# Patient Record
Sex: MALE | Race: WHITE | Employment: UNEMPLOYED | URBAN - METROPOLITAN AREA
[De-identification: names, ages, dates, MRNs, and addresses within clinical notes are randomized per-mention and may not be internally consistent; named-entity substitution may affect disease eponyms.]

---

## 2017-02-21 ENCOUNTER — OFFICE VISIT (OUTPATIENT)
Dept: FAMILY MEDICINE CLINIC | Age: 52
End: 2017-02-21

## 2017-02-21 VITALS — DIASTOLIC BLOOD PRESSURE: 110 MMHG | WEIGHT: 89 LBS | BODY MASS INDEX: 17.38 KG/M2 | SYSTOLIC BLOOD PRESSURE: 160 MMHG

## 2017-02-21 DIAGNOSIS — G47.33 OBSTRUCTIVE SLEEP APNEA: ICD-10-CM

## 2017-02-21 DIAGNOSIS — I10 ESSENTIAL HYPERTENSION: Primary | ICD-10-CM

## 2017-02-21 DIAGNOSIS — F41.9 ANXIETY: ICD-10-CM

## 2017-02-21 DIAGNOSIS — Z00.00 PREVENTATIVE HEALTH CARE: ICD-10-CM

## 2017-02-21 DIAGNOSIS — K21.9 GERD WITHOUT ESOPHAGITIS: ICD-10-CM

## 2017-02-21 DIAGNOSIS — Z11.59 NEED FOR HEPATITIS C SCREENING TEST: ICD-10-CM

## 2017-02-21 DIAGNOSIS — N52.9 ERECTILE DYSFUNCTION OF ORGANIC ORIGIN: ICD-10-CM

## 2017-02-21 DIAGNOSIS — J44.9 COPD, MODERATE (HCC): ICD-10-CM

## 2017-02-21 DIAGNOSIS — G71.00 MUSCULAR DYSTROPHY (HCC): ICD-10-CM

## 2017-02-21 LAB
ANION GAP SERPL CALCULATED.3IONS-SCNC: 14 MMOL/L (ref 3–16)
BUN BLDV-MCNC: 9 MG/DL (ref 7–20)
CALCIUM SERPL-MCNC: 9.4 MG/DL (ref 8.3–10.6)
CHLORIDE BLD-SCNC: 94 MMOL/L (ref 99–110)
CHOLESTEROL, TOTAL: 214 MG/DL (ref 0–199)
CO2: 32 MMOL/L (ref 21–32)
CREAT SERPL-MCNC: <0.5 MG/DL (ref 0.9–1.3)
GFR AFRICAN AMERICAN: >60
GFR NON-AFRICAN AMERICAN: >60
GLUCOSE BLD-MCNC: 106 MG/DL (ref 70–99)
HDLC SERPL-MCNC: 118 MG/DL (ref 40–60)
HEPATITIS C ANTIBODY INTERPRETATION: NORMAL
LDL CHOLESTEROL CALCULATED: 76 MG/DL
POTASSIUM SERPL-SCNC: 4.2 MMOL/L (ref 3.5–5.1)
SODIUM BLD-SCNC: 140 MMOL/L (ref 136–145)
TRIGL SERPL-MCNC: 102 MG/DL (ref 0–150)
VLDLC SERPL CALC-MCNC: 20 MG/DL

## 2017-02-21 PROCEDURE — G8484 FLU IMMUNIZE NO ADMIN: HCPCS | Performed by: FAMILY MEDICINE

## 2017-02-21 PROCEDURE — G8419 CALC BMI OUT NRM PARAM NOF/U: HCPCS | Performed by: FAMILY MEDICINE

## 2017-02-21 PROCEDURE — 36415 COLL VENOUS BLD VENIPUNCTURE: CPT | Performed by: FAMILY MEDICINE

## 2017-02-21 PROCEDURE — 3017F COLORECTAL CA SCREEN DOC REV: CPT | Performed by: FAMILY MEDICINE

## 2017-02-21 PROCEDURE — G8427 DOCREV CUR MEDS BY ELIG CLIN: HCPCS | Performed by: FAMILY MEDICINE

## 2017-02-21 PROCEDURE — G8926 SPIRO NO PERF OR DOC: HCPCS | Performed by: FAMILY MEDICINE

## 2017-02-21 PROCEDURE — 1036F TOBACCO NON-USER: CPT | Performed by: FAMILY MEDICINE

## 2017-02-21 PROCEDURE — 3023F SPIROM DOC REV: CPT | Performed by: FAMILY MEDICINE

## 2017-02-21 PROCEDURE — 99214 OFFICE O/P EST MOD 30 MIN: CPT | Performed by: FAMILY MEDICINE

## 2017-02-21 RX ORDER — LISINOPRIL 40 MG/1
TABLET ORAL
Qty: 30 TABLET | Refills: 5 | Status: SHIPPED | OUTPATIENT
Start: 2017-02-21 | End: 2017-09-09 | Stop reason: SDUPTHER

## 2017-02-21 RX ORDER — AMLODIPINE BESYLATE 5 MG/1
TABLET ORAL
Qty: 30 TABLET | Refills: 5 | Status: SHIPPED | OUTPATIENT
Start: 2017-02-21 | End: 2017-09-09 | Stop reason: SDUPTHER

## 2017-02-21 RX ORDER — ALBUTEROL SULFATE 90 UG/1
AEROSOL, METERED RESPIRATORY (INHALATION)
Qty: 8.5 G | Refills: 0 | Status: SHIPPED | OUTPATIENT
Start: 2017-02-21

## 2017-02-21 RX ORDER — DULOXETIN HYDROCHLORIDE 30 MG/1
CAPSULE, DELAYED RELEASE ORAL
Qty: 90 CAPSULE | Refills: 5 | Status: SHIPPED | OUTPATIENT
Start: 2017-02-21 | End: 2017-09-09 | Stop reason: SDUPTHER

## 2017-02-21 RX ORDER — HYDROXYZINE PAMOATE 25 MG/1
25 CAPSULE ORAL 4 TIMES DAILY PRN
Qty: 120 CAPSULE | Refills: 5 | Status: SHIPPED | OUTPATIENT
Start: 2017-02-21 | End: 2017-12-18

## 2017-02-21 RX ORDER — PANTOPRAZOLE SODIUM 40 MG/1
TABLET, DELAYED RELEASE ORAL
Qty: 30 TABLET | Refills: 5 | Status: SHIPPED | OUTPATIENT
Start: 2017-02-21 | End: 2017-09-09 | Stop reason: SDUPTHER

## 2017-02-21 RX ORDER — BUDESONIDE AND FORMOTEROL FUMARATE DIHYDRATE 80; 4.5 UG/1; UG/1
2 AEROSOL RESPIRATORY (INHALATION) 2 TIMES DAILY
Qty: 1 INHALER | Refills: 5 | Status: SHIPPED | OUTPATIENT
Start: 2017-02-21 | End: 2018-01-22 | Stop reason: ALTCHOICE

## 2017-02-22 DIAGNOSIS — R73.9 HYPERGLYCEMIA: ICD-10-CM

## 2017-04-19 ENCOUNTER — OFFICE VISIT (OUTPATIENT)
Dept: FAMILY MEDICINE CLINIC | Age: 52
End: 2017-04-19

## 2017-04-19 VITALS
DIASTOLIC BLOOD PRESSURE: 80 MMHG | SYSTOLIC BLOOD PRESSURE: 124 MMHG | WEIGHT: 85 LBS | BODY MASS INDEX: 16.69 KG/M2 | HEIGHT: 60 IN

## 2017-04-19 DIAGNOSIS — G71.00 MUSCULAR DYSTROPHY (HCC): Primary | ICD-10-CM

## 2017-04-19 DIAGNOSIS — Z00.00 PREVENTATIVE HEALTH CARE: ICD-10-CM

## 2017-04-19 DIAGNOSIS — F41.9 ANXIETY: ICD-10-CM

## 2017-04-19 PROCEDURE — G8427 DOCREV CUR MEDS BY ELIG CLIN: HCPCS | Performed by: FAMILY MEDICINE

## 2017-04-19 PROCEDURE — 1036F TOBACCO NON-USER: CPT | Performed by: FAMILY MEDICINE

## 2017-04-19 PROCEDURE — 99213 OFFICE O/P EST LOW 20 MIN: CPT | Performed by: FAMILY MEDICINE

## 2017-04-19 PROCEDURE — 3017F COLORECTAL CA SCREEN DOC REV: CPT | Performed by: FAMILY MEDICINE

## 2017-04-19 PROCEDURE — G8419 CALC BMI OUT NRM PARAM NOF/U: HCPCS | Performed by: FAMILY MEDICINE

## 2017-09-09 RX ORDER — DULOXETIN HYDROCHLORIDE 30 MG/1
CAPSULE, DELAYED RELEASE ORAL
Qty: 90 CAPSULE | Refills: 0 | Status: SHIPPED | OUTPATIENT
Start: 2017-09-09 | End: 2017-10-09 | Stop reason: SDUPTHER

## 2017-09-09 RX ORDER — LISINOPRIL 40 MG/1
TABLET ORAL
Qty: 30 TABLET | Refills: 0 | Status: SHIPPED | OUTPATIENT
Start: 2017-09-09 | End: 2017-10-09 | Stop reason: SDUPTHER

## 2017-09-09 RX ORDER — PANTOPRAZOLE SODIUM 40 MG/1
TABLET, DELAYED RELEASE ORAL
Qty: 30 TABLET | Refills: 0 | Status: SHIPPED | OUTPATIENT
Start: 2017-09-09 | End: 2017-09-24

## 2017-09-09 RX ORDER — AMLODIPINE BESYLATE 5 MG/1
TABLET ORAL
Qty: 30 TABLET | Refills: 0 | Status: SHIPPED | OUTPATIENT
Start: 2017-09-09 | End: 2017-10-09 | Stop reason: SDUPTHER

## 2017-10-09 RX ORDER — LISINOPRIL 40 MG/1
TABLET ORAL
Qty: 30 TABLET | Refills: 0 | Status: SHIPPED | OUTPATIENT
Start: 2017-10-09 | End: 2017-12-18

## 2017-10-09 RX ORDER — PANTOPRAZOLE SODIUM 40 MG/1
TABLET, DELAYED RELEASE ORAL
Qty: 30 TABLET | Refills: 0 | Status: SHIPPED | OUTPATIENT
Start: 2017-10-09 | End: 2017-12-18

## 2017-10-09 RX ORDER — AMLODIPINE BESYLATE 5 MG/1
TABLET ORAL
Qty: 30 TABLET | Refills: 0 | Status: SHIPPED | OUTPATIENT
Start: 2017-10-09 | End: 2017-12-18

## 2018-01-22 ENCOUNTER — OFFICE VISIT (OUTPATIENT)
Dept: INTERNAL MEDICINE CLINIC | Age: 53
End: 2018-01-22

## 2018-01-22 VITALS
BODY MASS INDEX: 18.26 KG/M2 | HEART RATE: 92 BPM | HEIGHT: 60 IN | OXYGEN SATURATION: 98 % | WEIGHT: 93 LBS | DIASTOLIC BLOOD PRESSURE: 72 MMHG | SYSTOLIC BLOOD PRESSURE: 118 MMHG | TEMPERATURE: 98.1 F

## 2018-01-22 DIAGNOSIS — N40.0 BENIGN PROSTATIC HYPERPLASIA WITHOUT LOWER URINARY TRACT SYMPTOMS: ICD-10-CM

## 2018-01-22 DIAGNOSIS — Z23 NEED FOR INFLUENZA VACCINATION: ICD-10-CM

## 2018-01-22 DIAGNOSIS — G71.00 MUSCULAR DYSTROPHY (HCC): ICD-10-CM

## 2018-01-22 DIAGNOSIS — I10 ESSENTIAL HYPERTENSION: Primary | ICD-10-CM

## 2018-01-22 DIAGNOSIS — Z12.5 SCREENING FOR PROSTATE CANCER: ICD-10-CM

## 2018-01-22 DIAGNOSIS — G47.33 OBSTRUCTIVE SLEEP APNEA: ICD-10-CM

## 2018-01-22 DIAGNOSIS — F32.A DEPRESSION, UNSPECIFIED DEPRESSION TYPE: ICD-10-CM

## 2018-01-22 DIAGNOSIS — F41.9 ANXIETY: ICD-10-CM

## 2018-01-22 DIAGNOSIS — M81.0 OSTEOPOROSIS, UNSPECIFIED OSTEOPOROSIS TYPE, UNSPECIFIED PATHOLOGICAL FRACTURE PRESENCE: ICD-10-CM

## 2018-01-22 DIAGNOSIS — K21.9 GERD WITHOUT ESOPHAGITIS: ICD-10-CM

## 2018-01-22 PROCEDURE — 90686 IIV4 VACC NO PRSV 0.5 ML IM: CPT | Performed by: INTERNAL MEDICINE

## 2018-01-22 PROCEDURE — 99215 OFFICE O/P EST HI 40 MIN: CPT | Performed by: INTERNAL MEDICINE

## 2018-01-22 PROCEDURE — G0008 ADMIN INFLUENZA VIRUS VAC: HCPCS | Performed by: INTERNAL MEDICINE

## 2018-01-22 RX ORDER — AMLODIPINE BESYLATE 5 MG/1
TABLET ORAL
Qty: 21 TABLET | Refills: 0 | Status: SHIPPED | OUTPATIENT
Start: 2018-01-22 | End: 2018-02-09 | Stop reason: SDUPTHER

## 2018-01-22 RX ORDER — DULOXETIN HYDROCHLORIDE 30 MG/1
CAPSULE, DELAYED RELEASE ORAL
Qty: 42 CAPSULE | Refills: 0 | Status: SHIPPED | OUTPATIENT
Start: 2018-01-22 | End: 2018-02-13 | Stop reason: SDUPTHER

## 2018-01-22 RX ORDER — HYDROXYZINE PAMOATE 25 MG/1
25 CAPSULE ORAL 3 TIMES DAILY PRN
Qty: 30 CAPSULE | Refills: 0 | Status: SHIPPED | OUTPATIENT
Start: 2018-01-22 | End: 2018-06-18 | Stop reason: SDUPTHER

## 2018-01-22 RX ORDER — PANTOPRAZOLE SODIUM 40 MG/1
TABLET, DELAYED RELEASE ORAL
Qty: 21 TABLET | Refills: 0 | Status: SHIPPED | OUTPATIENT
Start: 2018-01-22 | End: 2018-02-09 | Stop reason: SDUPTHER

## 2018-01-22 RX ORDER — LISINOPRIL 40 MG/1
TABLET ORAL
Qty: 21 TABLET | Refills: 0 | Status: SHIPPED | OUTPATIENT
Start: 2018-01-22 | End: 2018-02-09 | Stop reason: SDUPTHER

## 2018-01-22 NOTE — PROGRESS NOTES
mcg by mouth daily. No current facility-administered medications on file prior to visit. Allergies:  Review of patient's allergies indicates no known allergies. Social History:   TOBACCO:   reports that he has never smoked. He has never used smokeless tobacco.       ETOH:   reports that he does not drink alcohol. Recreational Drugs: former marijuana use. Employer: on disability  Ambulates: w/o assistance  Relationship status: single  Patient currently lives with family, son    Family History:       Problem Relation Age of Onset    Adopted: Yes    Asthma Mother     Alcohol Abuse Mother     Heart Disease Father      MI    Asthma Maternal Aunt     High Blood Pressure Maternal Aunt     Diabetes Maternal Uncle     High Blood Pressure Maternal Uncle     Stroke Maternal Grandmother     Cancer Maternal Grandmother      Colon     Children: son, 6y old.  heathy          Patient Active Problem List    Diagnosis Date Noted    Hyperglycemia     Nausea & vomiting 11/13/2016    Diarrhea 11/13/2016    Elevated troponin 11/13/2016    Generalized weakness 11/13/2016    Hyponatremia 11/13/2016    Hypochloremia 11/13/2016    Erectile dysfunction of organic origin     Essential hypertension     Obstructive sleep apnea     COPD, moderate (HCC)     GERD without esophagitis     Anxiety     Narcotic abuse     Scoliosis     Muscular dystrophy ([address]        Past Medical History:   Diagnosis Date    Anxiety     COPD, moderate ([address]     Erectile dysfunction of organic origin     Essential hypertension     GERD without esophagitis     Hyperglycemia     Muscular dystrophy ([address]     Narcotic abuse 01/24/2016    Suboxone on drug screen    Obstructive sleep apnea     BIPAP    Scoliosis        Past Surgical History:   Procedure Laterality Date    ACHILLES TENDON SURGERY      both legs    CHOLECYSTECTOMY         Office Visit on 02/21/2017   Component Date Value Ref Range Status    Sodium 02/21/2017 140  136 - 145 mmol/L Final    Potassium 02/21/2017 4.2  3.5 - 5.1 mmol/L Final    Chloride 02/21/2017 94* 99 - 110 mmol/L Final    CO2 02/21/2017 32  21 - 32 mmol/L Final    Anion Gap 02/21/2017 14  3 - 16 Final    Glucose 02/21/2017 106* 70 - 99 mg/dL Final    BUN 02/21/2017 9  7 - 20 mg/dL Final    CREATININE 02/21/2017 <0.5* 0.9 - 1.3 mg/dL Final    GFR Non- 02/21/2017 >60  >60 Final    Comment: >60 mL/min/1.73m2 EGFR, calc. for ages 25 and older using the  MDRD formula (not corrected for weight), is valid for stable  renal function.  GFR  02/21/2017 >60  >60 Final    Comment: Chronic Kidney Disease: less than 60 ml/min/1.73 sq.m. Kidney Failure: less than 15 ml/min/1.73 sq.m. Results valid for patients 18 years and older.  Calcium 02/21/2017 9.4  8.3 - 10.6 mg/dL Final    Cholesterol, Total 02/21/2017 214* 0 - 199 mg/dL Final    Triglycerides 02/21/2017 102  0 - 150 mg/dL Final    HDL 02/21/2017 118* 40 - 60 mg/dL Final    LDL Calculated 02/21/2017 76  <100 mg/dL Final    VLDL Cholesterol Calculated 02/21/2017 20  Not Established mg/dL Final    Hep C Ab Interp 02/21/2017 Non-reactive  Non-reactive Final       Family History   Problem Relation Age of Onset    Adopted:  Yes    Asthma Mother     Alcohol Abuse Mother     Heart Disease Father      MI    Asthma Maternal Aunt     High Blood Pressure Maternal Aunt     Diabetes Maternal Uncle     High Blood Pressure Maternal Uncle     Stroke Maternal Grandmother     Cancer Maternal Grandmother      Colon     Current Outpatient Prescriptions   Medication Sig Dispense Refill    amLODIPine (NORVASC) 5 MG tablet TAKE 1 TABLET BY MOUTH EVERY DAY 21 tablet 0    DULoxetine (CYMBALTA) 30 MG extended release capsule Take 1 by mouth daily for 1 week, then 2 daily for 1 week, then 3 daily for 1 week 42 capsule 0    hydrOXYzine (VISTARIL) 25 MG capsule Take 1 capsule by mouth 3 times daily as needed for Anxiety 30 capsule 0    lisinopril (PRINIVIL;ZESTRIL) 40 MG tablet TAKE 1 TABLET BY MOUTH DAILY 21 tablet 0    pantoprazole (PROTONIX) 40 MG tablet TAKE 1 TABLET BY MOUTH DAILY 21 tablet 0    albuterol sulfate HFA (PROAIR HFA) 108 (90 BASE) MCG/ACT inhaler INHALE 2 PUFFS INTO THE MOUTH EVERY 4 HOURS AS NEEDED FOR WHEEZING OR SHORTNESS OF BREATH 8.5 g 0    albuterol (PROVENTIL) (2.5 MG/3ML) 0.083% nebulizer solution Take 3 mLs by nebulization every 6 hours as needed for Wheezing. 30 each 0    Multiple Vitamins-Minerals (MULTIVITAMIN & MINERAL PO) Take  by mouth.  vitamin B-12 (CYANOCOBALAMIN) 1000 MCG tablet Take 1,000 mcg by mouth daily. No current facility-administered medications for this visit. No Known Allergies    Review of Systems   Constitutional: Negative for chills, fatigue and fever. HENT: Negative for ear pain, sore throat, tinnitus and trouble swallowing. Eyes: Negative for visual disturbance. Respiratory: Negative for shortness of breath and wheezing. Cardiovascular: Negative for chest pain and palpitations. Gastrointestinal: Negative for abdominal pain, diarrhea, nausea and vomiting. Endocrine: Negative for cold intolerance and heat intolerance. Genitourinary: Negative for difficulty urinating and dysuria. Neurological: Negative for dizziness, weakness and numbness. Psychiatric/Behavioral: Negative for agitation, decreased concentration and suicidal ideas. The patient is not nervous/anxious. All other systems reviewed and are negative. Vitals:  /72 (Site: Right Arm, Cuff Size: Medium Adult)   Pulse 92   Temp 98.1 °F (36.7 °C) (Oral)   Ht 5' (1.524 m) Comment: per patient refused to remove shoes  Wt 93 lb (42.2 kg)   SpO2 98%   BMI 18.16 kg/m²     Physical Exam   Constitutional: He is oriented to person, place, and time. He appears well-developed and well-nourished. HENT:   Head: Normocephalic and atraumatic.    Eyes:

## 2018-01-22 NOTE — PROGRESS NOTES
Vaccine Information Sheet, \"Influenza - Inactivated\"  given to Fabby Stewart, or parent/legal guardian of  Fabby Stewart and verbalized understanding. Patient responses:    Have you ever had a reaction to a flu vaccine? No  Are you able to eat eggs without adverse effects? Yes  Do you have any current illness? No  Have you ever had [place] Syndrome? No    Flu vaccine given per order. Please see immunization tab.

## 2018-01-26 ASSESSMENT — ENCOUNTER SYMPTOMS
ABDOMINAL PAIN: 0
NAUSEA: 0
TROUBLE SWALLOWING: 0
VOMITING: 0
SHORTNESS OF BREATH: 0
DIARRHEA: 0
WHEEZING: 0
SORE THROAT: 0

## 2018-02-09 DIAGNOSIS — I10 ESSENTIAL HYPERTENSION: ICD-10-CM

## 2018-02-09 DIAGNOSIS — K21.9 GERD WITHOUT ESOPHAGITIS: ICD-10-CM

## 2018-02-09 RX ORDER — LISINOPRIL 40 MG/1
TABLET ORAL
Qty: 21 TABLET | Refills: 0 | Status: SHIPPED | OUTPATIENT
Start: 2018-02-09 | End: 2018-02-27 | Stop reason: SDUPTHER

## 2018-02-09 RX ORDER — AMLODIPINE BESYLATE 5 MG/1
TABLET ORAL
Qty: 21 TABLET | Refills: 0 | Status: SHIPPED | OUTPATIENT
Start: 2018-02-09 | End: 2018-02-27 | Stop reason: SDUPTHER

## 2018-02-09 RX ORDER — PANTOPRAZOLE SODIUM 40 MG/1
TABLET, DELAYED RELEASE ORAL
Qty: 21 TABLET | Refills: 0 | Status: SHIPPED | OUTPATIENT
Start: 2018-02-09 | End: 2018-02-27 | Stop reason: SDUPTHER

## 2018-02-13 DIAGNOSIS — F32.A DEPRESSION, UNSPECIFIED DEPRESSION TYPE: ICD-10-CM

## 2018-02-13 DIAGNOSIS — F41.9 ANXIETY: ICD-10-CM

## 2018-02-13 DIAGNOSIS — G71.00 MUSCULAR DYSTROPHY (HCC): ICD-10-CM

## 2018-02-13 RX ORDER — DULOXETIN HYDROCHLORIDE 30 MG/1
CAPSULE, DELAYED RELEASE ORAL
Qty: 42 CAPSULE | Refills: 0 | Status: CANCELLED | OUTPATIENT
Start: 2018-02-13

## 2018-02-13 RX ORDER — DULOXETIN HYDROCHLORIDE 30 MG/1
CAPSULE, DELAYED RELEASE ORAL
Qty: 42 CAPSULE | Refills: 0 | Status: SHIPPED | OUTPATIENT
Start: 2018-02-13 | End: 2018-03-09 | Stop reason: SDUPTHER

## 2018-02-27 DIAGNOSIS — K21.9 GERD WITHOUT ESOPHAGITIS: ICD-10-CM

## 2018-02-27 DIAGNOSIS — I10 ESSENTIAL HYPERTENSION: ICD-10-CM

## 2018-02-27 RX ORDER — LISINOPRIL 40 MG/1
TABLET ORAL
Qty: 21 TABLET | Refills: 0 | Status: SHIPPED | OUTPATIENT
Start: 2018-02-27 | End: 2018-03-17 | Stop reason: SDUPTHER

## 2018-02-27 RX ORDER — AMLODIPINE BESYLATE 5 MG/1
TABLET ORAL
Qty: 21 TABLET | Refills: 0 | Status: SHIPPED | OUTPATIENT
Start: 2018-02-27 | End: 2018-03-17 | Stop reason: SDUPTHER

## 2018-02-27 RX ORDER — PANTOPRAZOLE SODIUM 40 MG/1
TABLET, DELAYED RELEASE ORAL
Qty: 21 TABLET | Refills: 0 | Status: SHIPPED | OUTPATIENT
Start: 2018-02-27 | End: 2018-03-17 | Stop reason: SDUPTHER

## 2018-02-28 RX ORDER — DULOXETIN HYDROCHLORIDE 30 MG/1
CAPSULE, DELAYED RELEASE ORAL
Qty: 21 CAPSULE | Refills: 0 | Status: SHIPPED | OUTPATIENT
Start: 2018-02-28 | End: 2018-04-13 | Stop reason: SDUPTHER

## 2018-03-09 DIAGNOSIS — F32.A DEPRESSION, UNSPECIFIED DEPRESSION TYPE: ICD-10-CM

## 2018-03-09 DIAGNOSIS — F41.9 ANXIETY: ICD-10-CM

## 2018-03-09 DIAGNOSIS — G71.00 MUSCULAR DYSTROPHY (HCC): ICD-10-CM

## 2018-03-09 RX ORDER — HYDROXYZINE PAMOATE 25 MG/1
CAPSULE ORAL
Qty: 120 CAPSULE | Refills: 0 | Status: SHIPPED | OUTPATIENT
Start: 2018-03-09 | End: 2018-03-30 | Stop reason: SDUPTHER

## 2018-03-12 RX ORDER — DULOXETIN HYDROCHLORIDE 30 MG/1
CAPSULE, DELAYED RELEASE ORAL
Qty: 42 CAPSULE | Refills: 0 | Status: SHIPPED | OUTPATIENT
Start: 2018-03-12 | End: 2018-03-26 | Stop reason: SDUPTHER

## 2018-03-17 DIAGNOSIS — I10 ESSENTIAL HYPERTENSION: ICD-10-CM

## 2018-03-17 DIAGNOSIS — K21.9 GERD WITHOUT ESOPHAGITIS: ICD-10-CM

## 2018-03-19 RX ORDER — AMLODIPINE BESYLATE 5 MG/1
TABLET ORAL
Qty: 21 TABLET | Refills: 0 | Status: SHIPPED | OUTPATIENT
Start: 2018-03-19 | End: 2018-04-15 | Stop reason: SDUPTHER

## 2018-03-19 RX ORDER — PANTOPRAZOLE SODIUM 40 MG/1
TABLET, DELAYED RELEASE ORAL
Qty: 21 TABLET | Refills: 0 | Status: SHIPPED | OUTPATIENT
Start: 2018-03-19 | End: 2018-04-15 | Stop reason: SDUPTHER

## 2018-03-19 RX ORDER — LISINOPRIL 40 MG/1
TABLET ORAL
Qty: 21 TABLET | Refills: 0 | Status: SHIPPED | OUTPATIENT
Start: 2018-03-19 | End: 2018-03-30

## 2018-03-26 DIAGNOSIS — F41.9 ANXIETY: ICD-10-CM

## 2018-03-26 DIAGNOSIS — F32.A DEPRESSION, UNSPECIFIED DEPRESSION TYPE: ICD-10-CM

## 2018-03-26 DIAGNOSIS — G71.00 MUSCULAR DYSTROPHY (HCC): ICD-10-CM

## 2018-03-26 RX ORDER — DULOXETIN HYDROCHLORIDE 30 MG/1
CAPSULE, DELAYED RELEASE ORAL
Qty: 42 CAPSULE | Refills: 0 | Status: SHIPPED | OUTPATIENT
Start: 2018-03-26 | End: 2018-03-30 | Stop reason: SDUPTHER

## 2018-03-30 ENCOUNTER — OFFICE VISIT (OUTPATIENT)
Dept: INTERNAL MEDICINE CLINIC | Age: 53
End: 2018-03-30

## 2018-03-30 VITALS
SYSTOLIC BLOOD PRESSURE: 96 MMHG | OXYGEN SATURATION: 98 % | HEART RATE: 115 BPM | BODY MASS INDEX: 18.16 KG/M2 | DIASTOLIC BLOOD PRESSURE: 70 MMHG | WEIGHT: 93 LBS

## 2018-03-30 DIAGNOSIS — G71.00 MUSCULAR DYSTROPHY (HCC): Primary | ICD-10-CM

## 2018-03-30 DIAGNOSIS — R73.9 HYPERGLYCEMIA: ICD-10-CM

## 2018-03-30 DIAGNOSIS — Z11.4 SCREENING FOR HIV (HUMAN IMMUNODEFICIENCY VIRUS): ICD-10-CM

## 2018-03-30 DIAGNOSIS — G89.29 OTHER CHRONIC PAIN: ICD-10-CM

## 2018-03-30 PROCEDURE — 99214 OFFICE O/P EST MOD 30 MIN: CPT | Performed by: INTERNAL MEDICINE

## 2018-03-30 RX ORDER — TRAMADOL HYDROCHLORIDE 50 MG/1
50 TABLET ORAL EVERY 8 HOURS PRN
Qty: 30 TABLET | Refills: 0 | Status: SHIPPED | OUTPATIENT
Start: 2018-03-30 | End: 2018-04-29

## 2018-03-30 RX ORDER — LISINOPRIL 20 MG/1
20 TABLET ORAL DAILY
COMMUNITY
End: 2018-07-23 | Stop reason: DRUGHIGH

## 2018-03-30 RX ORDER — PREDNISONE 20 MG/1
40 TABLET ORAL DAILY
Qty: 20 TABLET | Refills: 1 | Status: SHIPPED | OUTPATIENT
Start: 2018-03-30 | End: 2018-07-23 | Stop reason: ALTCHOICE

## 2018-03-30 ASSESSMENT — ENCOUNTER SYMPTOMS
NAUSEA: 0
WHEEZING: 0
DIARRHEA: 0
SORE THROAT: 0
BACK PAIN: 1
SHORTNESS OF BREATH: 0
ABDOMINAL PAIN: 0
TROUBLE SWALLOWING: 0
VOMITING: 0

## 2018-03-30 NOTE — PROGRESS NOTES
Past Surgical History:   Procedure Laterality Date    ACHILLES TENDON SURGERY      both legs    CHOLECYSTECTOMY         Admission on 03/10/2018, Discharged on 03/10/2018   Component Date Value Ref Range Status    Ventricular Rate 03/10/2018 104  BPM Final    Atrial Rate 03/10/2018 104  BPM Final    P-R Interval 03/10/2018 150  ms Final    QRS Duration 03/10/2018 96  ms Final    Q-T Interval 03/10/2018 306  ms Final    QTc Calculation (Bazett) 03/10/2018 402  ms Final    P Axis 03/10/2018 68  degrees Final    R Axis 03/10/2018 75  degrees Final    T Axis 03/10/2018 80  degrees Final    Diagnosis 03/10/2018    Final                    Value:Sinus tachycardia  Possible Left atrial enlargement  Borderline ECG  When compared with ECG of 13-NOV-2016 13:44,  No significant change was found  Confirmed by TREVOR GARDINER [facility] Ivan DEUTSCH ([id number]) on 3/11/2018 9:36:22 AM      WBC 03/10/2018 9.8  4.0 - 11.0 K/uL Final    RBC 03/10/2018 4.48  4.20 - 5.90 M/uL Final    Hemoglobin 03/10/2018 13.1* 13.5 - 17.5 g/dL Final    Hematocrit 03/10/2018 39.9* 40.5 - 52.5 % Final    MCV 03/10/2018 89.1  80.0 - 100.0 fL Final    MCH 03/10/2018 29.3  26.0 - 34.0 pg Final    MCHC 03/10/2018 32.9  31.0 - 36.0 g/dL Final    RDW 03/10/2018 13.7  12.4 - 15.4 % Final    Platelets 95/03/1791 383  135 - 450 K/uL Final    MPV 03/10/2018 6.9  5.0 - 10.5 fL Final    Neutrophils % 03/10/2018 58.1  % Final    Lymphocytes % 03/10/2018 31.8  % Final    Monocytes % 03/10/2018 7.0  % Final    Eosinophils % 03/10/2018 2.1  % Final    Basophils % 03/10/2018 1.0  % Final    Neutrophils # 03/10/2018 5.7  1.7 - 7.7 K/uL Final    Lymphocytes # 03/10/2018 3.1  1.0 - 5.1 K/uL Final    Monocytes # 03/10/2018 0.7  0.0 - 1.3 K/uL Final    Eosinophils # 03/10/2018 0.2  0.0 - 0.6 K/uL Final    Basophils # 03/10/2018 0.1  0.0 - 0.2 K/uL Final    Sodium 03/10/2018 143  136 - 145 mmol/L Final    Potassium 03/10/2018 3.8  3.5 - 5.1 mmol/L Final    Chloride 03/10/2018 97* 99 - 110 mmol/L Final    CO2 03/10/2018 32  21 - 32 mmol/L Final    Anion Gap 03/10/2018 14  3 - 16 Final    Glucose 03/10/2018 145* 70 - 99 mg/dL Final    BUN 03/10/2018 12  7 - 20 mg/dL Final    CREATININE 03/10/2018 <0.5* 0.9 - 1.3 mg/dL Final    GFR Non- 03/10/2018 >60  >60 Final    Comment: >60 mL/min/1.73m2 EGFR, calc. for ages 25 and older using the  MDRD formula (not corrected for weight), is valid for stable  renal function.  GFR  03/10/2018 >60  >60 Final    Comment: Chronic Kidney Disease: less than 60 ml/min/1.73 sq.m. Kidney Failure: less than 15 ml/min/1.73 sq.m. Results valid for patients 18 years and older.  Calcium 03/10/2018 9.0  8.3 - 10.6 mg/dL Final    Total Protein 03/10/2018 7.4  6.4 - 8.2 g/dL Final    Alb 03/10/2018 4.2  3.4 - 5.0 g/dL Final    Albumin/Globulin Ratio 03/10/2018 1.3  1.1 - 2.2 Final    Total Bilirubin 03/10/2018 <0.2  0.0 - 1.0 mg/dL Final    Alkaline Phosphatase 03/10/2018 78  40 - 129 U/L Final    ALT 03/10/2018 16  10 - 40 U/L Final    AST 03/10/2018 22  15 - 37 U/L Final    Comment: Specimen hemolysis has exceeded the interference as defined by Roche. Value may be falsely increased. Suggest recollection if clinically  indicated.  Globulin 03/10/2018 3.2  g/dL Final       Family History   Problem Relation Age of Onset    Adopted:  Yes    Asthma Mother     Alcohol Abuse Mother     Heart Disease Father      MI    Asthma Maternal Aunt     High Blood Pressure Maternal Aunt     Diabetes Maternal Uncle     High Blood Pressure Maternal Uncle     Stroke Maternal Grandmother     Cancer Maternal Grandmother      Colon       Current Outpatient Prescriptions   Medication Sig Dispense Refill    lisinopril (PRINIVIL;ZESTRIL) 20 MG tablet Take 20 mg by mouth daily      predniSONE (DELTASONE) 20 MG tablet Take 2 tablets by mouth daily Take 2 tablets by mouth once daily for 5 days 20 (ULTRAM) 50 MG tablet; Take 1 tablet by mouth every 8 hours as needed for Pain for up to 30 days . Take lowest dose possible to manage pain. Dispense: 30 tablet; Refill: 0    3. Hyperglycemia  - HEMOGLOBIN A1C; Future    4. Screening for HIV (human immunodeficiency virus)  - HIV-1 AND HIV-2 ANTIBODIES; Future      Orders Placed This Encounter   Procedures    HEMOGLOBIN A1C     Standing Status:   Future     Number of Occurrences:   1     Standing Expiration Date:   3/30/2019    HIV-1 AND HIV-2 ANTIBODIES     Standing Status:   Future     Number of Occurrences:   1     Standing Expiration Date:   3/30/2019   [facility] External Referral To Neurology     Referral Priority:   Routine     Referral Type:   Consult for Advice and Opinion     Referral Reason:   Specialty Services Required     Requested Specialty:   Neurology     Number of Visits Requested:   1       Return in about 3 months (around 6/30/2018). Verona Ortega MD     3/30/2018  3:30 PM    Documentation was done using voice recognition dragon software. Every effort was made to ensure accuracy; however, inadvertent unintentional computerized transcription errors may be present.

## 2018-03-31 LAB
ESTIMATED AVERAGE GLUCOSE: 114 MG/DL
HBA1C MFR BLD: 5.6 %

## 2018-04-03 ENCOUNTER — TELEPHONE (OUTPATIENT)
Dept: INTERNAL MEDICINE CLINIC | Age: 53
End: 2018-04-03

## 2018-04-04 ENCOUNTER — TELEPHONE (OUTPATIENT)
Dept: INTERNAL MEDICINE CLINIC | Age: 53
End: 2018-04-04

## 2018-04-05 DIAGNOSIS — G71.00 MUSCULAR DYSTROPHY (HCC): Primary | ICD-10-CM

## 2018-04-05 DIAGNOSIS — G89.29 OTHER CHRONIC PAIN: ICD-10-CM

## 2018-04-05 LAB
HIV AG/AB: NORMAL
HIV ANTIGEN: NORMAL
HIV-1 ANTIBODY: NORMAL
HIV-2 AB: NORMAL

## 2018-04-05 RX ORDER — HYDROCODONE BITARTRATE AND ACETAMINOPHEN 5; 325 MG/1; MG/1
1 TABLET ORAL EVERY 8 HOURS PRN
Qty: 30 TABLET | Refills: 0 | Status: SHIPPED | OUTPATIENT
Start: 2018-04-05 | End: 2018-04-18 | Stop reason: SDUPTHER

## 2018-04-13 RX ORDER — DULOXETIN HYDROCHLORIDE 30 MG/1
CAPSULE, DELAYED RELEASE ORAL
Qty: 21 CAPSULE | Refills: 0 | Status: SHIPPED | OUTPATIENT
Start: 2018-04-13 | End: 2018-04-18 | Stop reason: SDUPTHER

## 2018-04-15 DIAGNOSIS — I10 ESSENTIAL HYPERTENSION: ICD-10-CM

## 2018-04-15 DIAGNOSIS — K21.9 GERD WITHOUT ESOPHAGITIS: ICD-10-CM

## 2018-04-16 RX ORDER — PANTOPRAZOLE SODIUM 40 MG/1
TABLET, DELAYED RELEASE ORAL
Qty: 21 TABLET | Refills: 0 | Status: SHIPPED | OUTPATIENT
Start: 2018-04-16 | End: 2018-05-06 | Stop reason: SDUPTHER

## 2018-04-16 RX ORDER — AMLODIPINE BESYLATE 5 MG/1
TABLET ORAL
Qty: 21 TABLET | Refills: 0 | Status: SHIPPED | OUTPATIENT
Start: 2018-04-16 | End: 2018-05-06 | Stop reason: SDUPTHER

## 2018-04-16 RX ORDER — LISINOPRIL 40 MG/1
TABLET ORAL
Qty: 21 TABLET | Refills: 0 | Status: SHIPPED | OUTPATIENT
Start: 2018-04-16 | End: 2018-04-27 | Stop reason: SDUPTHER

## 2018-04-18 DIAGNOSIS — G71.00 MUSCULAR DYSTROPHY (HCC): ICD-10-CM

## 2018-04-18 DIAGNOSIS — G89.29 OTHER CHRONIC PAIN: ICD-10-CM

## 2018-04-18 RX ORDER — DULOXETIN HYDROCHLORIDE 30 MG/1
CAPSULE, DELAYED RELEASE ORAL
Qty: 21 CAPSULE | Refills: 0 | Status: SHIPPED | OUTPATIENT
Start: 2018-04-18 | End: 2018-04-27 | Stop reason: SDUPTHER

## 2018-04-18 RX ORDER — HYDROCODONE BITARTRATE AND ACETAMINOPHEN 5; 325 MG/1; MG/1
1 TABLET ORAL EVERY 8 HOURS PRN
Qty: 30 TABLET | Refills: 0 | Status: SHIPPED | OUTPATIENT
Start: 2018-04-18 | End: 2018-04-26 | Stop reason: SDUPTHER

## 2018-04-26 DIAGNOSIS — G89.29 OTHER CHRONIC PAIN: ICD-10-CM

## 2018-04-26 DIAGNOSIS — G71.00 MUSCULAR DYSTROPHY (HCC): ICD-10-CM

## 2018-04-26 RX ORDER — HYDROCODONE BITARTRATE AND ACETAMINOPHEN 5; 325 MG/1; MG/1
1 TABLET ORAL EVERY 8 HOURS PRN
Qty: 30 TABLET | Refills: 0 | Status: SHIPPED | OUTPATIENT
Start: 2018-04-26 | End: 2018-05-01 | Stop reason: SDUPTHER

## 2018-04-27 DIAGNOSIS — I10 ESSENTIAL HYPERTENSION: ICD-10-CM

## 2018-04-27 DIAGNOSIS — G71.00 MUSCULAR DYSTROPHY (HCC): ICD-10-CM

## 2018-04-27 DIAGNOSIS — G89.29 OTHER CHRONIC PAIN: ICD-10-CM

## 2018-04-27 RX ORDER — DULOXETIN HYDROCHLORIDE 30 MG/1
CAPSULE, DELAYED RELEASE ORAL
Qty: 21 CAPSULE | Refills: 0 | Status: SHIPPED | OUTPATIENT
Start: 2018-04-27 | End: 2018-05-15 | Stop reason: SDUPTHER

## 2018-04-27 RX ORDER — LISINOPRIL 40 MG/1
TABLET ORAL
Qty: 21 TABLET | Refills: 0 | Status: SHIPPED | OUTPATIENT
Start: 2018-04-27 | End: 2018-05-22 | Stop reason: SDUPTHER

## 2018-04-27 RX ORDER — HYDROCODONE BITARTRATE AND ACETAMINOPHEN 5; 325 MG/1; MG/1
1 TABLET ORAL EVERY 8 HOURS PRN
Qty: 30 TABLET | Refills: 0 | Status: CANCELLED | OUTPATIENT
Start: 2018-04-27 | End: 2018-04-30

## 2018-04-27 RX ORDER — DULOXETIN HYDROCHLORIDE 30 MG/1
CAPSULE, DELAYED RELEASE ORAL
Qty: 21 CAPSULE | Refills: 0 | Status: SHIPPED | OUTPATIENT
Start: 2018-04-27 | End: 2018-05-07 | Stop reason: SDUPTHER

## 2018-05-01 DIAGNOSIS — G71.00 MUSCULAR DYSTROPHY (HCC): ICD-10-CM

## 2018-05-01 DIAGNOSIS — G89.29 OTHER CHRONIC PAIN: ICD-10-CM

## 2018-05-02 DIAGNOSIS — G89.29 OTHER CHRONIC PAIN: ICD-10-CM

## 2018-05-02 DIAGNOSIS — G71.00 MUSCULAR DYSTROPHY (HCC): ICD-10-CM

## 2018-05-02 RX ORDER — HYDROCODONE BITARTRATE AND ACETAMINOPHEN 5; 325 MG/1; MG/1
1 TABLET ORAL EVERY 8 HOURS PRN
Qty: 30 TABLET | Refills: 0 | Status: SHIPPED | OUTPATIENT
Start: 2018-05-02 | End: 2018-05-14 | Stop reason: SDUPTHER

## 2018-05-02 RX ORDER — HYDROCODONE BITARTRATE AND ACETAMINOPHEN 5; 325 MG/1; MG/1
1 TABLET ORAL EVERY 8 HOURS PRN
Qty: 30 TABLET | Refills: 0 | OUTPATIENT
Start: 2018-05-02 | End: 2018-05-05

## 2018-05-06 DIAGNOSIS — I10 ESSENTIAL HYPERTENSION: ICD-10-CM

## 2018-05-06 DIAGNOSIS — K21.9 GERD WITHOUT ESOPHAGITIS: ICD-10-CM

## 2018-05-07 RX ORDER — AMLODIPINE BESYLATE 5 MG/1
TABLET ORAL
Qty: 21 TABLET | Refills: 0 | Status: SHIPPED | OUTPATIENT
Start: 2018-05-07 | End: 2018-05-27 | Stop reason: SDUPTHER

## 2018-05-07 RX ORDER — DULOXETIN HYDROCHLORIDE 30 MG/1
CAPSULE, DELAYED RELEASE ORAL
Qty: 21 CAPSULE | Refills: 0 | Status: SHIPPED | OUTPATIENT
Start: 2018-05-07 | End: 2018-05-19 | Stop reason: SDUPTHER

## 2018-05-07 RX ORDER — PANTOPRAZOLE SODIUM 40 MG/1
TABLET, DELAYED RELEASE ORAL
Qty: 21 TABLET | Refills: 0 | Status: SHIPPED | OUTPATIENT
Start: 2018-05-07 | End: 2018-05-27 | Stop reason: SDUPTHER

## 2018-05-14 DIAGNOSIS — G89.29 OTHER CHRONIC PAIN: ICD-10-CM

## 2018-05-14 DIAGNOSIS — G71.00 MUSCULAR DYSTROPHY (HCC): ICD-10-CM

## 2018-05-14 RX ORDER — HYDROCODONE BITARTRATE AND ACETAMINOPHEN 5; 325 MG/1; MG/1
1 TABLET ORAL EVERY 8 HOURS PRN
Qty: 30 TABLET | Refills: 0 | Status: SHIPPED | OUTPATIENT
Start: 2018-05-14 | End: 2018-05-17

## 2018-05-16 RX ORDER — DULOXETIN HYDROCHLORIDE 30 MG/1
CAPSULE, DELAYED RELEASE ORAL
Qty: 21 CAPSULE | Refills: 0 | Status: SHIPPED | OUTPATIENT
Start: 2018-05-16 | End: 2018-05-22 | Stop reason: SDUPTHER

## 2018-05-19 RX ORDER — DULOXETIN HYDROCHLORIDE 30 MG/1
CAPSULE, DELAYED RELEASE ORAL
Qty: 21 CAPSULE | Refills: 0 | Status: SHIPPED | OUTPATIENT
Start: 2018-05-19 | End: 2018-05-23 | Stop reason: SDUPTHER

## 2018-05-22 DIAGNOSIS — G89.29 OTHER CHRONIC PAIN: ICD-10-CM

## 2018-05-22 DIAGNOSIS — I10 ESSENTIAL HYPERTENSION: ICD-10-CM

## 2018-05-22 DIAGNOSIS — G71.00 MUSCULAR DYSTROPHY (HCC): ICD-10-CM

## 2018-05-23 DIAGNOSIS — G89.29 OTHER CHRONIC PAIN: ICD-10-CM

## 2018-05-23 DIAGNOSIS — G71.00 MUSCULAR DYSTROPHY (HCC): ICD-10-CM

## 2018-05-23 RX ORDER — HYDROCODONE BITARTRATE AND ACETAMINOPHEN 5; 325 MG/1; MG/1
1 TABLET ORAL EVERY 8 HOURS PRN
Qty: 30 TABLET | Refills: 0 | Status: SHIPPED | OUTPATIENT
Start: 2018-05-23 | End: 2018-06-04 | Stop reason: SDUPTHER

## 2018-05-23 RX ORDER — DULOXETIN HYDROCHLORIDE 30 MG/1
CAPSULE, DELAYED RELEASE ORAL
Qty: 21 CAPSULE | Refills: 0 | Status: SHIPPED | OUTPATIENT
Start: 2018-05-23 | End: 2018-07-09

## 2018-05-23 RX ORDER — HYDROCODONE BITARTRATE AND ACETAMINOPHEN 5; 325 MG/1; MG/1
1 TABLET ORAL EVERY 8 HOURS PRN
Qty: 30 TABLET | Refills: 0 | OUTPATIENT
Start: 2018-05-23 | End: 2018-05-26

## 2018-05-23 RX ORDER — LISINOPRIL 40 MG/1
TABLET ORAL
Qty: 21 TABLET | Refills: 0 | Status: SHIPPED | OUTPATIENT
Start: 2018-05-23 | End: 2018-06-18 | Stop reason: SDUPTHER

## 2018-05-23 RX ORDER — DULOXETIN HYDROCHLORIDE 30 MG/1
CAPSULE, DELAYED RELEASE ORAL
Qty: 21 CAPSULE | Refills: 0 | Status: SHIPPED | OUTPATIENT
Start: 2018-05-23 | End: 2018-05-27 | Stop reason: SDUPTHER

## 2018-05-27 DIAGNOSIS — I10 ESSENTIAL HYPERTENSION: ICD-10-CM

## 2018-05-27 DIAGNOSIS — K21.9 GERD WITHOUT ESOPHAGITIS: ICD-10-CM

## 2018-05-30 RX ORDER — DULOXETIN HYDROCHLORIDE 30 MG/1
CAPSULE, DELAYED RELEASE ORAL
Qty: 21 CAPSULE | Refills: 0 | Status: SHIPPED | OUTPATIENT
Start: 2018-05-30 | End: 2018-06-13 | Stop reason: SDUPTHER

## 2018-05-30 RX ORDER — AMLODIPINE BESYLATE 5 MG/1
TABLET ORAL
Qty: 21 TABLET | Refills: 0 | Status: SHIPPED | OUTPATIENT
Start: 2018-05-30 | End: 2018-06-18 | Stop reason: SDUPTHER

## 2018-05-30 RX ORDER — PANTOPRAZOLE SODIUM 40 MG/1
TABLET, DELAYED RELEASE ORAL
Qty: 21 TABLET | Refills: 0 | Status: SHIPPED | OUTPATIENT
Start: 2018-05-30 | End: 2018-06-19 | Stop reason: SDUPTHER

## 2018-06-04 DIAGNOSIS — G71.00 MUSCULAR DYSTROPHY (HCC): ICD-10-CM

## 2018-06-04 DIAGNOSIS — G89.29 OTHER CHRONIC PAIN: ICD-10-CM

## 2018-06-04 RX ORDER — HYDROCODONE BITARTRATE AND ACETAMINOPHEN 5; 325 MG/1; MG/1
1 TABLET ORAL EVERY 8 HOURS PRN
Qty: 30 TABLET | Refills: 0 | Status: SHIPPED | OUTPATIENT
Start: 2018-06-04 | End: 2018-06-18 | Stop reason: SDUPTHER

## 2018-06-13 RX ORDER — DULOXETIN HYDROCHLORIDE 30 MG/1
CAPSULE, DELAYED RELEASE ORAL
Qty: 21 CAPSULE | Refills: 0 | Status: SHIPPED | OUTPATIENT
Start: 2018-06-13 | End: 2018-06-20 | Stop reason: SDUPTHER

## 2018-06-18 DIAGNOSIS — I10 ESSENTIAL HYPERTENSION: ICD-10-CM

## 2018-06-18 DIAGNOSIS — G71.00 MUSCULAR DYSTROPHY (HCC): ICD-10-CM

## 2018-06-18 DIAGNOSIS — G89.29 OTHER CHRONIC PAIN: ICD-10-CM

## 2018-06-18 DIAGNOSIS — F41.9 ANXIETY: ICD-10-CM

## 2018-06-18 RX ORDER — LISINOPRIL 40 MG/1
TABLET ORAL
Qty: 30 TABLET | Refills: 5 | Status: SHIPPED | OUTPATIENT
Start: 2018-06-18 | End: 2018-11-13 | Stop reason: SDUPTHER

## 2018-06-18 RX ORDER — HYDROCODONE BITARTRATE AND ACETAMINOPHEN 5; 325 MG/1; MG/1
1 TABLET ORAL EVERY 8 HOURS PRN
Qty: 30 TABLET | Refills: 0 | Status: SHIPPED | OUTPATIENT
Start: 2018-06-18 | End: 2018-06-26 | Stop reason: SDUPTHER

## 2018-06-18 RX ORDER — HYDROCODONE BITARTRATE AND ACETAMINOPHEN 5; 325 MG/1; MG/1
1 TABLET ORAL EVERY 8 HOURS PRN
Qty: 30 TABLET | Refills: 0 | Status: CANCELLED | OUTPATIENT
Start: 2018-06-18 | End: 2018-06-21

## 2018-06-18 RX ORDER — AMLODIPINE BESYLATE 5 MG/1
TABLET ORAL
Qty: 21 TABLET | Refills: 5 | Status: SHIPPED | OUTPATIENT
Start: 2018-06-18 | End: 2018-07-23 | Stop reason: ALTCHOICE

## 2018-06-18 RX ORDER — HYDROXYZINE PAMOATE 25 MG/1
25 CAPSULE ORAL 3 TIMES DAILY PRN
Qty: 30 CAPSULE | Refills: 5 | Status: SHIPPED | OUTPATIENT
Start: 2018-06-18 | End: 2018-11-13 | Stop reason: SDUPTHER

## 2018-06-19 DIAGNOSIS — I10 ESSENTIAL HYPERTENSION: ICD-10-CM

## 2018-06-19 DIAGNOSIS — K21.9 GERD WITHOUT ESOPHAGITIS: ICD-10-CM

## 2018-06-20 RX ORDER — AMLODIPINE BESYLATE 5 MG/1
TABLET ORAL
Qty: 21 TABLET | Refills: 0 | Status: SHIPPED | OUTPATIENT
Start: 2018-06-20 | End: 2018-07-23 | Stop reason: SDUPTHER

## 2018-06-20 RX ORDER — PANTOPRAZOLE SODIUM 40 MG/1
TABLET, DELAYED RELEASE ORAL
Qty: 21 TABLET | Refills: 0 | Status: SHIPPED | OUTPATIENT
Start: 2018-06-20 | End: 2018-07-12 | Stop reason: SDUPTHER

## 2018-06-20 RX ORDER — DULOXETIN HYDROCHLORIDE 30 MG/1
CAPSULE, DELAYED RELEASE ORAL
Qty: 21 CAPSULE | Refills: 0 | Status: SHIPPED | OUTPATIENT
Start: 2018-06-20 | End: 2018-06-24 | Stop reason: SDUPTHER

## 2018-06-20 RX ORDER — LISINOPRIL 40 MG/1
TABLET ORAL
Qty: 21 TABLET | Refills: 0 | Status: SHIPPED | OUTPATIENT
Start: 2018-06-20 | End: 2018-07-23 | Stop reason: SDUPTHER

## 2018-06-25 RX ORDER — DULOXETIN HYDROCHLORIDE 30 MG/1
CAPSULE, DELAYED RELEASE ORAL
Qty: 21 CAPSULE | Refills: 0 | Status: SHIPPED | OUTPATIENT
Start: 2018-06-25 | End: 2018-07-01 | Stop reason: SDUPTHER

## 2018-06-26 DIAGNOSIS — G89.29 OTHER CHRONIC PAIN: ICD-10-CM

## 2018-06-26 DIAGNOSIS — G71.00 MUSCULAR DYSTROPHY (HCC): ICD-10-CM

## 2018-06-27 RX ORDER — HYDROCODONE BITARTRATE AND ACETAMINOPHEN 5; 325 MG/1; MG/1
1 TABLET ORAL EVERY 8 HOURS PRN
Qty: 30 TABLET | Refills: 0 | Status: SHIPPED | OUTPATIENT
Start: 2018-06-27 | End: 2018-07-09 | Stop reason: SDUPTHER

## 2018-07-03 RX ORDER — DULOXETIN HYDROCHLORIDE 30 MG/1
CAPSULE, DELAYED RELEASE ORAL
Qty: 21 CAPSULE | Refills: 0 | Status: SHIPPED | OUTPATIENT
Start: 2018-07-03 | End: 2018-07-08 | Stop reason: SDUPTHER

## 2018-07-09 DIAGNOSIS — G71.00 MUSCULAR DYSTROPHY (HCC): ICD-10-CM

## 2018-07-09 DIAGNOSIS — G89.29 OTHER CHRONIC PAIN: ICD-10-CM

## 2018-07-09 RX ORDER — HYDROCODONE BITARTRATE AND ACETAMINOPHEN 5; 325 MG/1; MG/1
1 TABLET ORAL EVERY 8 HOURS PRN
Qty: 15 TABLET | Refills: 0 | Status: SHIPPED | OUTPATIENT
Start: 2018-07-09 | End: 2018-08-08

## 2018-07-09 RX ORDER — DULOXETIN HYDROCHLORIDE 30 MG/1
CAPSULE, DELAYED RELEASE ORAL
Qty: 21 CAPSULE | Refills: 0 | Status: SHIPPED | OUTPATIENT
Start: 2018-07-09 | End: 2018-07-14 | Stop reason: SDUPTHER

## 2018-07-12 DIAGNOSIS — K21.9 GERD WITHOUT ESOPHAGITIS: ICD-10-CM

## 2018-07-12 RX ORDER — PANTOPRAZOLE SODIUM 40 MG/1
TABLET, DELAYED RELEASE ORAL
Qty: 21 TABLET | Refills: 0 | Status: SHIPPED | OUTPATIENT
Start: 2018-07-12 | End: 2018-08-05 | Stop reason: SDUPTHER

## 2018-07-14 RX ORDER — DULOXETIN HYDROCHLORIDE 30 MG/1
CAPSULE, DELAYED RELEASE ORAL
Qty: 21 CAPSULE | Refills: 0 | Status: SHIPPED | OUTPATIENT
Start: 2018-07-14 | End: 2018-07-18 | Stop reason: SDUPTHER

## 2018-07-18 RX ORDER — DULOXETIN HYDROCHLORIDE 30 MG/1
CAPSULE, DELAYED RELEASE ORAL
Qty: 21 CAPSULE | Refills: 0 | Status: SHIPPED | OUTPATIENT
Start: 2018-07-18 | End: 2018-07-23 | Stop reason: SDUPTHER

## 2018-07-23 ENCOUNTER — OFFICE VISIT (OUTPATIENT)
Dept: INTERNAL MEDICINE CLINIC | Age: 53
End: 2018-07-23

## 2018-07-23 VITALS
OXYGEN SATURATION: 98 % | HEART RATE: 95 BPM | SYSTOLIC BLOOD PRESSURE: 116 MMHG | DIASTOLIC BLOOD PRESSURE: 66 MMHG | BODY MASS INDEX: 19.92 KG/M2 | WEIGHT: 102 LBS

## 2018-07-23 DIAGNOSIS — G71.00 MUSCULAR DYSTROPHY (HCC): ICD-10-CM

## 2018-07-23 DIAGNOSIS — K21.9 GERD WITHOUT ESOPHAGITIS: ICD-10-CM

## 2018-07-23 DIAGNOSIS — G89.29 OTHER CHRONIC PAIN: ICD-10-CM

## 2018-07-23 DIAGNOSIS — I10 ESSENTIAL HYPERTENSION: Primary | ICD-10-CM

## 2018-07-23 PROCEDURE — 99214 OFFICE O/P EST MOD 30 MIN: CPT | Performed by: INTERNAL MEDICINE

## 2018-07-23 RX ORDER — DULOXETIN HYDROCHLORIDE 30 MG/1
CAPSULE, DELAYED RELEASE ORAL
Qty: 21 CAPSULE | Refills: 3 | Status: SHIPPED | OUTPATIENT
Start: 2018-07-23 | End: 2018-08-18 | Stop reason: SDUPTHER

## 2018-07-23 RX ORDER — ASCORBIC ACID 500 MG
500 TABLET ORAL DAILY
Status: ON HOLD | COMMUNITY
End: 2021-04-26 | Stop reason: HOSPADM

## 2018-07-23 RX ORDER — HYDROCODONE BITARTRATE AND ACETAMINOPHEN 5; 325 MG/1; MG/1
1 TABLET ORAL EVERY 8 HOURS PRN
Qty: 15 TABLET | Refills: 0 | Status: CANCELLED | OUTPATIENT
Start: 2018-07-23 | End: 2018-08-22

## 2018-07-23 RX ORDER — HYDROCODONE BITARTRATE AND ACETAMINOPHEN 7.5; 325 MG/1; MG/1
1 TABLET ORAL DAILY PRN
Qty: 30 TABLET | Refills: 0 | Status: SHIPPED | OUTPATIENT
Start: 2018-07-23 | End: 2018-08-02 | Stop reason: SDUPTHER

## 2018-07-23 RX ORDER — OMEGA-3S/DHA/EPA/FISH OIL/D3 300MG-1000
400 CAPSULE ORAL DAILY
Status: ON HOLD | COMMUNITY
End: 2021-04-26 | Stop reason: HOSPADM

## 2018-07-23 ASSESSMENT — ENCOUNTER SYMPTOMS
DIARRHEA: 0
VOMITING: 0
ABDOMINAL PAIN: 0
BACK PAIN: 1
SHORTNESS OF BREATH: 0
NAUSEA: 0
SORE THROAT: 0
TROUBLE SWALLOWING: 0
WHEEZING: 0

## 2018-07-23 NOTE — PROGRESS NOTES
Department of Internal Medicine  Clinic Note    Date: 7/23/2018                                               Subjective/Objective:     Chief Complaint   Patient presents with    Hypertension     HPI: Pt presents today for evaluation of hypertension. He also has a history of chronic back pain which is persistent. Pts BP is on the low end, we will discontinue his Norvasc today. Patient Active Problem List    Diagnosis Date Noted    Hyperglycemia     Nausea & vomiting 11/13/2016    Diarrhea 11/13/2016    Elevated troponin 11/13/2016    Generalized weakness 11/13/2016    Hyponatremia 11/13/2016    Hypochloremia 11/13/2016    Erectile dysfunction of organic origin     Essential hypertension     Obstructive sleep apnea     COPD, moderate (HCC)     GERD without esophagitis     Anxiety     Narcotic abuse     Scoliosis     Muscular dystrophy ([address]        Social History:   TOBACCO:   reports that he has never smoked. He has never used smokeless tobacco.     ETOH:   reports that he does not drink alcohol.     Past Medical History:   Diagnosis Date    Anxiety     COPD, moderate ([address]     Erectile dysfunction of organic origin     Essential hypertension     GERD without esophagitis     Hyperglycemia     Muscular dystrophy ([address]     Narcotic abuse 01/24/2016    Suboxone on drug screen    Obstructive sleep apnea     BIPAP    Scoliosis        Past Surgical History:   Procedure Laterality Date    ACHILLES TENDON SURGERY      both legs    CHOLECYSTECTOMY       Orders Only on 03/30/2018   Component Date Value Ref Range Status    Hemoglobin A1C 03/30/2018 5.6  See comment % Final    Comment: Comment:  Diagnosis of Diabetes: > or = 6.5%  Increased risk of diabetes (Prediabetes): 5.7-6.4%  Glycemic Control: Nonpregnant Adults: <7.0%                    Pregnant: <6.0%        eAG 03/30/2018 114.0  mg/dL Final    HIV Ag/Ab 03/30/2018 Non-Reactive  Non-reactive Final    HIV-1 Antibody 03/30/2018 Non-Reactive  Non-reactive Final    HIV ANTIGEN 03/30/2018 Non-Reactive  Non-reactive Final    HIV-2 Ab 03/30/2018 Non-Reactive  Non-reactive Final       Family History   Problem Relation Age of Onset    Adopted: Yes    Asthma Mother     Alcohol Abuse Mother     Heart Disease Father         MI    Asthma Maternal Aunt     High Blood Pressure Maternal Aunt     Diabetes Maternal Uncle     High Blood Pressure Maternal Uncle     Stroke Maternal Grandmother     Cancer Maternal Grandmother         Colon     Current Outpatient Prescriptions   Medication Sig Dispense Refill    vitamin C (ASCORBIC ACID) 500 MG tablet Take 500 mg by mouth daily      vitamin D3 (CHOLECALCIFEROL) 400 units TABS tablet Take 400 Units by mouth daily      DULoxetine (CYMBALTA) 30 MG extended release capsule TAKE 3 CAPSULES BY MOUTH EVERY DAY 21 capsule 3    HYDROcodone-acetaminophen (NORCO) 7.5-325 MG per tablet Take 1 tablet by mouth daily as needed for Pain for up to 30 days. Intended supply: 30 days. 30 tablet 0    pantoprazole (PROTONIX) 40 MG tablet TAKE 1 TABLET BY MOUTH DAILY 21 tablet 0    HYDROcodone-acetaminophen (NORCO) 5-325 MG per tablet Take 1 tablet by mouth every 8 hours as needed for Pain for up to 30 days. . 15 tablet 0    lisinopril (PRINIVIL;ZESTRIL) 40 MG tablet TAKE 1 TABLET BY MOUTH DAILY 30 tablet 5    hydrOXYzine (VISTARIL) 25 MG capsule Take 1 capsule by mouth 3 times daily as needed for Anxiety 30 capsule 5    albuterol sulfate HFA (PROAIR HFA) 108 (90 BASE) MCG/ACT inhaler INHALE 2 PUFFS INTO THE MOUTH EVERY 4 HOURS AS NEEDED FOR WHEEZING OR SHORTNESS OF BREATH 8.5 g 0    albuterol (PROVENTIL) (2.5 MG/3ML) 0.083% nebulizer solution Take 3 mLs by nebulization every 6 hours as needed for Wheezing. 30 each 0    Multiple Vitamins-Minerals (MULTIVITAMIN & MINERAL PO) Take  by mouth.  vitamin B-12 (CYANOCOBALAMIN) 1000 MCG tablet Take 1,000 mcg by mouth daily.          No current facility-administered medications for this visit. No Known Allergies    Review of Systems   Constitutional: Negative for chills, fatigue and fever. HENT: Negative for ear pain, sore throat, tinnitus and trouble swallowing. Eyes: Negative for visual disturbance. Respiratory: Negative for shortness of breath and wheezing. Cardiovascular: Negative for chest pain and palpitations. Gastrointestinal: Negative for abdominal pain, diarrhea, nausea and vomiting. Endocrine: Negative for cold intolerance and heat intolerance. Genitourinary: Negative for difficulty urinating and dysuria. Musculoskeletal: Positive for arthralgias, back pain and myalgias. Neurological: Negative for dizziness, weakness and numbness. Psychiatric/Behavioral: Negative for agitation, decreased concentration and suicidal ideas. The patient is not nervous/anxious. Vitals:  /66 (Site: Right Arm, Cuff Size: Medium Adult)   Pulse 95   Wt 102 lb (46.3 kg)   SpO2 98%   BMI 19.92 kg/m²     Physical Exam   Constitutional: He is oriented to person, place, and time. He appears well-developed and well-nourished. HENT:   Head: Normocephalic and atraumatic. Eyes: Conjunctivae and lids are normal. Pupils are equal, round, and reactive to light. Neck: Trachea normal and normal range of motion. No hepatojugular reflux and no JVD present. Carotid bruit is not present. No thyromegaly present. Cardiovascular: Normal rate, regular rhythm and normal heart sounds. No murmur heard. Pulmonary/Chest: Effort normal and breath sounds normal. No respiratory distress. Abdominal: Soft. Normal appearance and bowel sounds are normal. He exhibits no distension. There is no tenderness. Musculoskeletal: Normal range of motion. Lymphadenopathy:     He has no cervical adenopathy. Neurological: He is alert and oriented to person, place, and time. He has normal strength. No cranial nerve deficit or sensory deficit.    Skin: Skin is warm, dry and

## 2018-08-02 DIAGNOSIS — G89.29 OTHER CHRONIC PAIN: ICD-10-CM

## 2018-08-02 DIAGNOSIS — G71.00 MUSCULAR DYSTROPHY (HCC): ICD-10-CM

## 2018-08-02 RX ORDER — HYDROCODONE BITARTRATE AND ACETAMINOPHEN 7.5; 325 MG/1; MG/1
1 TABLET ORAL DAILY PRN
Qty: 30 TABLET | Refills: 0 | Status: SHIPPED | OUTPATIENT
Start: 2018-08-02 | End: 2018-08-20 | Stop reason: SDUPTHER

## 2018-08-05 DIAGNOSIS — K21.9 GERD WITHOUT ESOPHAGITIS: ICD-10-CM

## 2018-08-06 RX ORDER — PANTOPRAZOLE SODIUM 40 MG/1
TABLET, DELAYED RELEASE ORAL
Qty: 90 TABLET | Refills: 3 | Status: SHIPPED | OUTPATIENT
Start: 2018-08-06 | End: 2018-11-13 | Stop reason: SDUPTHER

## 2018-08-20 ENCOUNTER — TELEPHONE (OUTPATIENT)
Dept: INTERNAL MEDICINE CLINIC | Age: 53
End: 2018-08-20

## 2018-08-20 DIAGNOSIS — G89.29 OTHER CHRONIC PAIN: ICD-10-CM

## 2018-08-20 DIAGNOSIS — G71.00 MUSCULAR DYSTROPHY (HCC): ICD-10-CM

## 2018-08-20 NOTE — TELEPHONE ENCOUNTER
Patient is calling in for refills of     HYDROcodone-acetaminophen (NORCO) 7.5-325 MG per tablet 30 tablet 0 8/2/2018 9/1/2018    Sig - Route: Take 1 tablet by mouth daily as needed for Pain for up to 30 days.  Intended supply: 30 days. - Oral    Sent to pharmacy as: Hydrocodone-Acetaminophen 7.5-325 MG Oral Tablet      DULoxetine (CYMBALTA) 30 MG extended release capsule 90 capsule 11 8/20/2018     Sig: TAKE 3 CAPSULES BY MOUTH EVERY DAY    Sent to pharmacy as: DULoxetine HCl 30 MG Oral Capsule Delayed Release Particles      [facility] [address], [place] [address] [address] [facility] [phone] - F [phone]    Please Advise   SOBIA#[phone]

## 2018-08-21 RX ORDER — HYDROCODONE BITARTRATE AND ACETAMINOPHEN 7.5; 325 MG/1; MG/1
1 TABLET ORAL DAILY PRN
Qty: 30 TABLET | Refills: 0 | Status: SHIPPED | OUTPATIENT
Start: 2018-08-21 | End: 2018-09-24 | Stop reason: SDUPTHER

## 2018-08-21 RX ORDER — DULOXETIN HYDROCHLORIDE 30 MG/1
CAPSULE, DELAYED RELEASE ORAL
Qty: 90 CAPSULE | Refills: 11 | Status: SHIPPED | OUTPATIENT
Start: 2018-08-21 | End: 2018-11-13 | Stop reason: SDUPTHER

## 2019-08-26 ENCOUNTER — HOSPITAL ENCOUNTER (INPATIENT)
Age: 54
LOS: 2 days | Discharge: NURSING, REHAB OR LONG-TERM CARE | DRG: 194 | End: 2019-08-28
Attending: EMERGENCY MEDICINE | Admitting: INTERNAL MEDICINE
Payer: MEDICARE

## 2019-08-26 ENCOUNTER — APPOINTMENT (OUTPATIENT)
Dept: GENERAL RADIOLOGY | Age: 54
DRG: 194 | End: 2019-08-26
Payer: MEDICARE

## 2019-08-26 ENCOUNTER — APPOINTMENT (OUTPATIENT)
Dept: CT IMAGING | Age: 54
DRG: 194 | End: 2019-08-26
Payer: MEDICARE

## 2019-08-26 DIAGNOSIS — J18.9 PNEUMONIA OF RIGHT LOWER LOBE DUE TO INFECTIOUS ORGANISM: Primary | ICD-10-CM

## 2019-08-26 DIAGNOSIS — R77.8 ELEVATED TROPONIN: ICD-10-CM

## 2019-08-26 DIAGNOSIS — R53.83 FATIGUE, UNSPECIFIED TYPE: ICD-10-CM

## 2019-08-26 DIAGNOSIS — R09.02 HYPOXIA: ICD-10-CM

## 2019-08-26 DIAGNOSIS — G89.29 CHRONIC BACK PAIN, UNSPECIFIED BACK LOCATION, UNSPECIFIED BACK PAIN LATERALITY: ICD-10-CM

## 2019-08-26 DIAGNOSIS — M54.9 CHRONIC BACK PAIN, UNSPECIFIED BACK LOCATION, UNSPECIFIED BACK PAIN LATERALITY: ICD-10-CM

## 2019-08-26 DIAGNOSIS — R53.1 GENERALIZED WEAKNESS: ICD-10-CM

## 2019-08-26 LAB
A/G RATIO: 1.3 (ref 1.1–2.2)
ALBUMIN SERPL-MCNC: 4 G/DL (ref 3.4–5)
ALP BLD-CCNC: 70 U/L (ref 40–129)
ALT SERPL-CCNC: 11 U/L (ref 10–40)
AMPHETAMINE SCREEN, URINE: NORMAL
ANION GAP SERPL CALCULATED.3IONS-SCNC: 16 MMOL/L (ref 3–16)
AST SERPL-CCNC: 19 U/L (ref 15–37)
BARBITURATE SCREEN URINE: NORMAL
BASOPHILS ABSOLUTE: 0.1 K/UL (ref 0–0.2)
BASOPHILS RELATIVE PERCENT: 1.2 %
BENZODIAZEPINE SCREEN, URINE: NORMAL
BILIRUB SERPL-MCNC: 0.3 MG/DL (ref 0–1)
BILIRUBIN URINE: ABNORMAL
BLOOD, URINE: ABNORMAL
BUN BLDV-MCNC: 9 MG/DL (ref 7–20)
CALCIUM SERPL-MCNC: 9.3 MG/DL (ref 8.3–10.6)
CANNABINOID SCREEN URINE: NORMAL
CHLORIDE BLD-SCNC: 94 MMOL/L (ref 99–110)
CHOLESTEROL, TOTAL: 163 MG/DL (ref 0–199)
CLARITY: CLEAR
CO2: 28 MMOL/L (ref 21–32)
COCAINE METABOLITE SCREEN URINE: NORMAL
COLOR: ABNORMAL
CREAT SERPL-MCNC: <0.5 MG/DL (ref 0.9–1.3)
EOSINOPHILS ABSOLUTE: 0 K/UL (ref 0–0.6)
EOSINOPHILS RELATIVE PERCENT: 0.5 %
EPITHELIAL CELLS, UA: 0 /HPF (ref 0–5)
GFR AFRICAN AMERICAN: >60
GFR NON-AFRICAN AMERICAN: >60
GLOBULIN: 3 G/DL
GLUCOSE BLD-MCNC: 79 MG/DL (ref 70–99)
GLUCOSE URINE: NEGATIVE MG/DL
HCT VFR BLD CALC: 39.4 % (ref 40.5–52.5)
HDLC SERPL-MCNC: 75 MG/DL (ref 40–60)
HEMOGLOBIN: 12.9 G/DL (ref 13.5–17.5)
HYALINE CASTS: 1 /LPF (ref 0–8)
KETONES, URINE: >=80 MG/DL
LACTIC ACID: 0.8 MMOL/L (ref 0.4–2)
LDL CHOLESTEROL CALCULATED: 73 MG/DL
LEUKOCYTE ESTERASE, URINE: NEGATIVE
LYMPHOCYTES ABSOLUTE: 1.9 K/UL (ref 1–5.1)
LYMPHOCYTES RELATIVE PERCENT: 24.7 %
Lab: NORMAL
MAGNESIUM: 1.6 MG/DL (ref 1.8–2.4)
MCH RBC QN AUTO: 28.8 PG (ref 26–34)
MCHC RBC AUTO-ENTMCNC: 32.7 G/DL (ref 31–36)
MCV RBC AUTO: 88.3 FL (ref 80–100)
METHADONE SCREEN, URINE: NORMAL
MICROSCOPIC EXAMINATION: YES
MONOCYTES ABSOLUTE: 0.4 K/UL (ref 0–1.3)
MONOCYTES RELATIVE PERCENT: 5.7 %
NEUTROPHILS ABSOLUTE: 5.2 K/UL (ref 1.7–7.7)
NEUTROPHILS RELATIVE PERCENT: 67.9 %
NITRITE, URINE: NEGATIVE
OPIATE SCREEN URINE: NORMAL
OXYCODONE URINE: NORMAL
PDW BLD-RTO: 14.9 % (ref 12.4–15.4)
PH UA: 6
PH UA: 6 (ref 5–8)
PHENCYCLIDINE SCREEN URINE: NORMAL
PLATELET # BLD: 345 K/UL (ref 135–450)
PMV BLD AUTO: 7 FL (ref 5–10.5)
POTASSIUM SERPL-SCNC: 3.9 MMOL/L (ref 3.5–5.1)
PROPOXYPHENE SCREEN: NORMAL
PROTEIN UA: NEGATIVE MG/DL
RBC # BLD: 4.47 M/UL (ref 4.2–5.9)
RBC UA: 14 /HPF (ref 0–4)
SODIUM BLD-SCNC: 138 MMOL/L (ref 136–145)
SPECIFIC GRAVITY UA: 1.02 (ref 1–1.03)
TOTAL CK: 55 U/L (ref 39–308)
TOTAL PROTEIN: 7 G/DL (ref 6.4–8.2)
TRIGL SERPL-MCNC: 75 MG/DL (ref 0–150)
TROPONIN: 0.04 NG/ML
TROPONIN: 0.04 NG/ML
TROPONIN: 0.06 NG/ML
URINE REFLEX TO CULTURE: ABNORMAL
URINE TYPE: ABNORMAL
UROBILINOGEN, URINE: 1 E.U./DL
VLDLC SERPL CALC-MCNC: 15 MG/DL
WBC # BLD: 7.7 K/UL (ref 4–11)
WBC UA: 1 /HPF (ref 0–5)

## 2019-08-26 PROCEDURE — 83735 ASSAY OF MAGNESIUM: CPT

## 2019-08-26 PROCEDURE — 71046 X-RAY EXAM CHEST 2 VIEWS: CPT

## 2019-08-26 PROCEDURE — 36415 COLL VENOUS BLD VENIPUNCTURE: CPT

## 2019-08-26 PROCEDURE — 87040 BLOOD CULTURE FOR BACTERIA: CPT

## 2019-08-26 PROCEDURE — 6370000000 HC RX 637 (ALT 250 FOR IP): Performed by: PHYSICIAN ASSISTANT

## 2019-08-26 PROCEDURE — 96361 HYDRATE IV INFUSION ADD-ON: CPT

## 2019-08-26 PROCEDURE — 1200000000 HC SEMI PRIVATE

## 2019-08-26 PROCEDURE — 93005 ELECTROCARDIOGRAM TRACING: CPT | Performed by: PHYSICIAN ASSISTANT

## 2019-08-26 PROCEDURE — 70450 CT HEAD/BRAIN W/O DYE: CPT

## 2019-08-26 PROCEDURE — 84484 ASSAY OF TROPONIN QUANT: CPT

## 2019-08-26 PROCEDURE — 96365 THER/PROPH/DIAG IV INF INIT: CPT

## 2019-08-26 PROCEDURE — 99285 EMERGENCY DEPT VISIT HI MDM: CPT

## 2019-08-26 PROCEDURE — 80307 DRUG TEST PRSMV CHEM ANLYZR: CPT

## 2019-08-26 PROCEDURE — 94640 AIRWAY INHALATION TREATMENT: CPT

## 2019-08-26 PROCEDURE — 83605 ASSAY OF LACTIC ACID: CPT

## 2019-08-26 PROCEDURE — 87449 NOS EACH ORGANISM AG IA: CPT

## 2019-08-26 PROCEDURE — 6370000000 HC RX 637 (ALT 250 FOR IP): Performed by: INTERNAL MEDICINE

## 2019-08-26 PROCEDURE — 80053 COMPREHEN METABOLIC PANEL: CPT

## 2019-08-26 PROCEDURE — 6360000002 HC RX W HCPCS: Performed by: INTERNAL MEDICINE

## 2019-08-26 PROCEDURE — 2580000003 HC RX 258: Performed by: INTERNAL MEDICINE

## 2019-08-26 PROCEDURE — 96367 TX/PROPH/DG ADDL SEQ IV INF: CPT

## 2019-08-26 PROCEDURE — 80061 LIPID PANEL: CPT

## 2019-08-26 PROCEDURE — 2580000003 HC RX 258: Performed by: PHYSICIAN ASSISTANT

## 2019-08-26 PROCEDURE — 82550 ASSAY OF CK (CPK): CPT

## 2019-08-26 PROCEDURE — 6360000002 HC RX W HCPCS: Performed by: PHYSICIAN ASSISTANT

## 2019-08-26 PROCEDURE — 81001 URINALYSIS AUTO W/SCOPE: CPT

## 2019-08-26 PROCEDURE — 85025 COMPLETE CBC W/AUTO DIFF WBC: CPT

## 2019-08-26 RX ORDER — SODIUM CHLORIDE 9 MG/ML
INJECTION, SOLUTION INTRAVENOUS CONTINUOUS
Status: DISCONTINUED | OUTPATIENT
Start: 2019-08-26 | End: 2019-08-28 | Stop reason: HOSPADM

## 2019-08-26 RX ORDER — DULOXETIN HYDROCHLORIDE 60 MG/1
60 CAPSULE, DELAYED RELEASE ORAL DAILY
Status: DISCONTINUED | OUTPATIENT
Start: 2019-08-27 | End: 2019-08-28 | Stop reason: HOSPADM

## 2019-08-26 RX ORDER — ASCORBIC ACID 500 MG
500 TABLET ORAL DAILY
Status: DISCONTINUED | OUTPATIENT
Start: 2019-08-27 | End: 2019-08-28 | Stop reason: HOSPADM

## 2019-08-26 RX ORDER — TAMSULOSIN HYDROCHLORIDE 0.4 MG/1
0.4 CAPSULE ORAL DAILY
Status: DISCONTINUED | OUTPATIENT
Start: 2019-08-27 | End: 2019-08-28 | Stop reason: HOSPADM

## 2019-08-26 RX ORDER — SENNA PLUS 8.6 MG/1
1 TABLET ORAL DAILY PRN
Status: DISCONTINUED | OUTPATIENT
Start: 2019-08-26 | End: 2019-08-28 | Stop reason: HOSPADM

## 2019-08-26 RX ORDER — IPRATROPIUM BROMIDE AND ALBUTEROL SULFATE 2.5; .5 MG/3ML; MG/3ML
1 SOLUTION RESPIRATORY (INHALATION)
Status: DISCONTINUED | OUTPATIENT
Start: 2019-08-26 | End: 2019-08-28 | Stop reason: HOSPADM

## 2019-08-26 RX ORDER — ALBUTEROL SULFATE 2.5 MG/3ML
2.5 SOLUTION RESPIRATORY (INHALATION)
Status: DISCONTINUED | OUTPATIENT
Start: 2019-08-26 | End: 2019-08-28 | Stop reason: HOSPADM

## 2019-08-26 RX ORDER — ONDANSETRON 2 MG/ML
4 INJECTION INTRAMUSCULAR; INTRAVENOUS EVERY 6 HOURS PRN
Status: DISCONTINUED | OUTPATIENT
Start: 2019-08-26 | End: 2019-08-28 | Stop reason: HOSPADM

## 2019-08-26 RX ORDER — SODIUM CHLORIDE 0.9 % (FLUSH) 0.9 %
10 SYRINGE (ML) INJECTION EVERY 12 HOURS SCHEDULED
Status: DISCONTINUED | OUTPATIENT
Start: 2019-08-26 | End: 2019-08-28 | Stop reason: HOSPADM

## 2019-08-26 RX ORDER — 0.9 % SODIUM CHLORIDE 0.9 %
1000 INTRAVENOUS SOLUTION INTRAVENOUS ONCE
Status: COMPLETED | OUTPATIENT
Start: 2019-08-26 | End: 2019-08-26

## 2019-08-26 RX ORDER — OMEGA-3S/DHA/EPA/FISH OIL/D3 300MG-1000
400 CAPSULE ORAL DAILY
Status: DISCONTINUED | OUTPATIENT
Start: 2019-08-27 | End: 2019-08-28 | Stop reason: HOSPADM

## 2019-08-26 RX ORDER — HYDROCODONE BITARTRATE AND ACETAMINOPHEN 5; 325 MG/1; MG/1
1 TABLET ORAL ONCE
Status: COMPLETED | OUTPATIENT
Start: 2019-08-26 | End: 2019-08-26

## 2019-08-26 RX ORDER — ACETAMINOPHEN 325 MG/1
650 TABLET ORAL EVERY 4 HOURS PRN
Status: DISCONTINUED | OUTPATIENT
Start: 2019-08-26 | End: 2019-08-28 | Stop reason: HOSPADM

## 2019-08-26 RX ORDER — 0.9 % SODIUM CHLORIDE 0.9 %
677 INTRAVENOUS SOLUTION INTRAVENOUS ONCE
Status: COMPLETED | OUTPATIENT
Start: 2019-08-26 | End: 2019-08-26

## 2019-08-26 RX ORDER — LANOLIN ALCOHOL/MO/W.PET/CERES
1000 CREAM (GRAM) TOPICAL DAILY
Status: DISCONTINUED | OUTPATIENT
Start: 2019-08-27 | End: 2019-08-28 | Stop reason: HOSPADM

## 2019-08-26 RX ORDER — PANTOPRAZOLE SODIUM 40 MG/1
40 TABLET, DELAYED RELEASE ORAL
Status: DISCONTINUED | OUTPATIENT
Start: 2019-08-27 | End: 2019-08-28 | Stop reason: HOSPADM

## 2019-08-26 RX ORDER — ATORVASTATIN CALCIUM 40 MG/1
40 TABLET, FILM COATED ORAL NIGHTLY
Status: DISCONTINUED | OUTPATIENT
Start: 2019-08-26 | End: 2019-08-28 | Stop reason: HOSPADM

## 2019-08-26 RX ORDER — HYDROCODONE BITARTRATE AND ACETAMINOPHEN 10; 325 MG/1; MG/1
1 TABLET ORAL 3 TIMES DAILY PRN
Status: ON HOLD | COMMUNITY
End: 2019-08-28 | Stop reason: SDUPTHER

## 2019-08-26 RX ORDER — ASPIRIN 81 MG/1
81 TABLET, CHEWABLE ORAL DAILY
Status: DISCONTINUED | OUTPATIENT
Start: 2019-08-26 | End: 2019-08-28 | Stop reason: HOSPADM

## 2019-08-26 RX ORDER — SODIUM CHLORIDE 0.9 % (FLUSH) 0.9 %
10 SYRINGE (ML) INJECTION PRN
Status: DISCONTINUED | OUTPATIENT
Start: 2019-08-26 | End: 2019-08-28 | Stop reason: HOSPADM

## 2019-08-26 RX ORDER — LISINOPRIL 10 MG/1
10 TABLET ORAL DAILY
Status: DISCONTINUED | OUTPATIENT
Start: 2019-08-27 | End: 2019-08-28 | Stop reason: HOSPADM

## 2019-08-26 RX ADMIN — CEFTRIAXONE 1 G: 1 INJECTION, POWDER, FOR SOLUTION INTRAMUSCULAR; INTRAVENOUS at 16:25

## 2019-08-26 RX ADMIN — ONDANSETRON 4 MG: 2 INJECTION INTRAMUSCULAR; INTRAVENOUS at 18:17

## 2019-08-26 RX ADMIN — SODIUM CHLORIDE 1000 ML: 9 INJECTION, SOLUTION INTRAVENOUS at 15:22

## 2019-08-26 RX ADMIN — HYDROCODONE BITARTRATE AND ACETAMINOPHEN 1 TABLET: 5; 325 TABLET ORAL at 15:22

## 2019-08-26 RX ADMIN — IPRATROPIUM BROMIDE AND ALBUTEROL SULFATE 1 AMPULE: .5; 3 SOLUTION RESPIRATORY (INHALATION) at 20:14

## 2019-08-26 RX ADMIN — AZITHROMYCIN MONOHYDRATE 500 MG: 500 INJECTION, POWDER, LYOPHILIZED, FOR SOLUTION INTRAVENOUS at 16:58

## 2019-08-26 RX ADMIN — Medication 10 ML: at 20:37

## 2019-08-26 RX ADMIN — SODIUM CHLORIDE 677 ML: 9 INJECTION, SOLUTION INTRAVENOUS at 16:25

## 2019-08-26 RX ADMIN — ASPIRIN 81 MG 81 MG: 81 TABLET ORAL at 18:17

## 2019-08-26 RX ADMIN — ATORVASTATIN CALCIUM 40 MG: 40 TABLET, FILM COATED ORAL at 20:37

## 2019-08-26 RX ADMIN — SODIUM CHLORIDE: 9 INJECTION, SOLUTION INTRAVENOUS at 18:05

## 2019-08-26 ASSESSMENT — PAIN DESCRIPTION - LOCATION
LOCATION: LEG

## 2019-08-26 ASSESSMENT — PAIN SCALES - GENERAL
PAINLEVEL_OUTOF10: 6
PAINLEVEL_OUTOF10: 9
PAINLEVEL_OUTOF10: 7
PAINLEVEL_OUTOF10: 5

## 2019-08-26 ASSESSMENT — ENCOUNTER SYMPTOMS
BACK PAIN: 0
NAUSEA: 0
VOMITING: 0
COLOR CHANGE: 0
ABDOMINAL PAIN: 0
DIARRHEA: 0
SHORTNESS OF BREATH: 0
COUGH: 0

## 2019-08-26 ASSESSMENT — PAIN DESCRIPTION - PROGRESSION
CLINICAL_PROGRESSION: NOT CHANGED
CLINICAL_PROGRESSION: GRADUALLY WORSENING

## 2019-08-26 ASSESSMENT — PAIN DESCRIPTION - PAIN TYPE
TYPE: ACUTE PAIN
TYPE: CHRONIC PAIN
TYPE: ACUTE PAIN

## 2019-08-26 ASSESSMENT — PAIN DESCRIPTION - FREQUENCY
FREQUENCY: CONTINUOUS

## 2019-08-26 ASSESSMENT — PAIN DESCRIPTION - ORIENTATION
ORIENTATION: RIGHT;LEFT
ORIENTATION: RIGHT;LEFT

## 2019-08-26 ASSESSMENT — PAIN - FUNCTIONAL ASSESSMENT
PAIN_FUNCTIONAL_ASSESSMENT: ACTIVITIES ARE NOT PREVENTED
PAIN_FUNCTIONAL_ASSESSMENT: ACTIVITIES ARE NOT PREVENTED

## 2019-08-26 ASSESSMENT — PAIN DESCRIPTION - DESCRIPTORS
DESCRIPTORS: CRAMPING;SPASM
DESCRIPTORS: CRAMPING
DESCRIPTORS: ACHING

## 2019-08-26 ASSESSMENT — PAIN DESCRIPTION - ONSET
ONSET: ON-GOING
ONSET: ON-GOING

## 2019-08-26 NOTE — ED TRIAGE NOTES
Pt arrives to the ER via EMS with complaints of generalized weakness. Pt states that he has not had enough energy over the past few days to even get out of his chair. Pt states he had a urinal by his chair so that he didn't have to get up. NAD noted, respirations even and easy, skin cool and clammy.

## 2019-08-26 NOTE — ED NOTES
Pt was getting up to walk, O2 sat dropped to 88% before standing up.  Gonzalez Moise made aware     Anat , SAMANTHA  08/26/19 [id number]

## 2019-08-26 NOTE — H&P
1/28/19  Yes Venancio Davila MD   lisinopril (PRINIVIL;ZESTRIL) 40 MG tablet TAKE 1 TABLET BY MOUTH DAILY 11/13/18  Yes Venancio Davila MD   vitamin C (ASCORBIC ACID) 500 MG tablet Take 500 mg by mouth daily   Yes Historical Provider, MD   vitamin D3 (CHOLECALCIFEROL) 400 units TABS tablet Take 400 Units by mouth daily   Yes Historical Provider, MD   albuterol sulfate HFA (PROAIR HFA) 108 (90 BASE) MCG/ACT inhaler INHALE 2 PUFFS INTO THE MOUTH EVERY 4 HOURS AS NEEDED FOR WHEEZING OR SHORTNESS OF BREATH 2/21/17  Yes Cecy Hunt, DO   albuterol (PROVENTIL) (2.5 MG/3ML) 0.083% nebulizer solution Take 3 mLs by nebulization every 6 hours as needed for Wheezing. 9/30/14  Yes Gay Cheng, DO   Multiple Vitamins-Minerals (MULTIVITAMIN & MINERAL PO) Take 1 tablet by mouth daily    Yes Historical Provider, MD   vitamin B-12 (CYANOCOBALAMIN) 1000 MCG tablet Take 1,000 mcg by mouth daily. Yes Historical Provider, MD       Allergies:  Patient has no known allergies. Social History:      The patient currently lives at home    TOBACCO:   reports that he has never smoked. He has never used smokeless tobacco.  ETOH:   reports that he does not drink alcohol. Family History:      Reviewed in detail and negative for DM, CAD, Cancer, CVA. Positive as follows: Adopted: Yes   Problem Relation Age of Onset    Asthma Mother     Alcohol Abuse Mother     Heart Disease Father         MI    Asthma Maternal Aunt     High Blood Pressure Maternal Aunt     Diabetes Maternal Uncle     High Blood Pressure Maternal Uncle     Stroke Maternal Grandmother     Cancer Maternal Grandmother         Colon       REVIEW OF SYSTEMS:   Pertinent positives as noted in the HPI. Weakness, progressive fatigue, dry cough. All other systems reviewed and negative.     PHYSICAL EXAM PERFORMED:    BP (!) 160/97   Pulse 88   Temp 98.9 °F (37.2 °C) (Oral)   Resp 14   Ht 5' (1.524 m)   Wt 123 lb 3.8 oz (55.9 kg)   SpO2 96%   BMI

## 2019-08-26 NOTE — ED NOTES
Bed: B-04  Expected date:   Expected time:   Means of arrival: [facility]  Comments:  54M weakness     Cornell Urrutia RN  08/26/19 [id number]

## 2019-08-26 NOTE — PROGRESS NOTES
Pt's admission completed. Dinner ordered, resting in bed, oriented to room, no requests at this time.

## 2019-08-27 LAB
A/G RATIO: 1.2 (ref 1.1–2.2)
ALBUMIN SERPL-MCNC: 3.5 G/DL (ref 3.4–5)
ALP BLD-CCNC: 61 U/L (ref 40–129)
ALT SERPL-CCNC: 9 U/L (ref 10–40)
ANION GAP SERPL CALCULATED.3IONS-SCNC: 13 MMOL/L (ref 3–16)
AST SERPL-CCNC: 17 U/L (ref 15–37)
BASOPHILS ABSOLUTE: 0 K/UL (ref 0–0.2)
BASOPHILS RELATIVE PERCENT: 0.7 %
BILIRUB SERPL-MCNC: <0.2 MG/DL (ref 0–1)
BUN BLDV-MCNC: 7 MG/DL (ref 7–20)
CALCIUM SERPL-MCNC: 8.2 MG/DL (ref 8.3–10.6)
CHLORIDE BLD-SCNC: 101 MMOL/L (ref 99–110)
CO2: 30 MMOL/L (ref 21–32)
CREAT SERPL-MCNC: <0.5 MG/DL (ref 0.9–1.3)
EKG ATRIAL RATE: 83 BPM
EKG DIAGNOSIS: NORMAL
EKG P AXIS: 49 DEGREES
EKG P-R INTERVAL: 160 MS
EKG Q-T INTERVAL: 358 MS
EKG QRS DURATION: 98 MS
EKG QTC CALCULATION (BAZETT): 420 MS
EKG R AXIS: 29 DEGREES
EKG T AXIS: 75 DEGREES
EKG VENTRICULAR RATE: 83 BPM
EOSINOPHILS ABSOLUTE: 0.1 K/UL (ref 0–0.6)
EOSINOPHILS RELATIVE PERCENT: 1.2 %
GFR AFRICAN AMERICAN: >60
GFR NON-AFRICAN AMERICAN: >60
GLOBULIN: 2.9 G/DL
GLUCOSE BLD-MCNC: 117 MG/DL (ref 70–99)
HCT VFR BLD CALC: 36.8 % (ref 40.5–52.5)
HEMOGLOBIN: 12.2 G/DL (ref 13.5–17.5)
L. PNEUMOPHILA SEROGP 1 UR AG: NORMAL
LV EF: 53 %
LVEF MODALITY: NORMAL
LYMPHOCYTES ABSOLUTE: 1 K/UL (ref 1–5.1)
LYMPHOCYTES RELATIVE PERCENT: 13.4 %
MCH RBC QN AUTO: 29.3 PG (ref 26–34)
MCHC RBC AUTO-ENTMCNC: 33.3 G/DL (ref 31–36)
MCV RBC AUTO: 87.9 FL (ref 80–100)
MONOCYTES ABSOLUTE: 0.3 K/UL (ref 0–1.3)
MONOCYTES RELATIVE PERCENT: 4.5 %
NEUTROPHILS ABSOLUTE: 5.8 K/UL (ref 1.7–7.7)
NEUTROPHILS RELATIVE PERCENT: 80.2 %
PDW BLD-RTO: 14.7 % (ref 12.4–15.4)
PLATELET # BLD: 300 K/UL (ref 135–450)
PMV BLD AUTO: 7 FL (ref 5–10.5)
POTASSIUM REFLEX MAGNESIUM: 3.8 MMOL/L (ref 3.5–5.1)
RBC # BLD: 4.18 M/UL (ref 4.2–5.9)
SODIUM BLD-SCNC: 144 MMOL/L (ref 136–145)
STREP PNEUMONIAE ANTIGEN, URINE: NORMAL
TOTAL PROTEIN: 6.4 G/DL (ref 6.4–8.2)
WBC # BLD: 7.2 K/UL (ref 4–11)

## 2019-08-27 PROCEDURE — 2700000000 HC OXYGEN THERAPY PER DAY

## 2019-08-27 PROCEDURE — 94640 AIRWAY INHALATION TREATMENT: CPT

## 2019-08-27 PROCEDURE — 85025 COMPLETE CBC W/AUTO DIFF WBC: CPT

## 2019-08-27 PROCEDURE — 6370000000 HC RX 637 (ALT 250 FOR IP): Performed by: INTERNAL MEDICINE

## 2019-08-27 PROCEDURE — 97166 OT EVAL MOD COMPLEX 45 MIN: CPT

## 2019-08-27 PROCEDURE — 6360000002 HC RX W HCPCS: Performed by: INTERNAL MEDICINE

## 2019-08-27 PROCEDURE — 94760 N-INVAS EAR/PLS OXIMETRY 1: CPT

## 2019-08-27 PROCEDURE — 2580000003 HC RX 258: Performed by: INTERNAL MEDICINE

## 2019-08-27 PROCEDURE — 97530 THERAPEUTIC ACTIVITIES: CPT

## 2019-08-27 PROCEDURE — 97162 PT EVAL MOD COMPLEX 30 MIN: CPT

## 2019-08-27 PROCEDURE — 97535 SELF CARE MNGMENT TRAINING: CPT

## 2019-08-27 PROCEDURE — 93306 TTE W/DOPPLER COMPLETE: CPT

## 2019-08-27 PROCEDURE — 36415 COLL VENOUS BLD VENIPUNCTURE: CPT

## 2019-08-27 PROCEDURE — 97116 GAIT TRAINING THERAPY: CPT

## 2019-08-27 PROCEDURE — 93010 ELECTROCARDIOGRAM REPORT: CPT | Performed by: INTERNAL MEDICINE

## 2019-08-27 PROCEDURE — 92610 EVALUATE SWALLOWING FUNCTION: CPT

## 2019-08-27 PROCEDURE — 80053 COMPREHEN METABOLIC PANEL: CPT

## 2019-08-27 PROCEDURE — 1200000000 HC SEMI PRIVATE

## 2019-08-27 RX ORDER — HYDROCODONE BITARTRATE AND ACETAMINOPHEN 5; 325 MG/1; MG/1
2 TABLET ORAL 3 TIMES DAILY PRN
Status: DISCONTINUED | OUTPATIENT
Start: 2019-08-27 | End: 2019-08-28 | Stop reason: HOSPADM

## 2019-08-27 RX ADMIN — CYANOCOBALAMIN TAB 1000 MCG 1000 MCG: 1000 TAB at 10:06

## 2019-08-27 RX ADMIN — Medication 10 ML: at 20:20

## 2019-08-27 RX ADMIN — ASPIRIN 81 MG 81 MG: 81 TABLET ORAL at 10:06

## 2019-08-27 RX ADMIN — SODIUM CHLORIDE: 9 INJECTION, SOLUTION INTRAVENOUS at 05:36

## 2019-08-27 RX ADMIN — CEFTRIAXONE 1 G: 1 INJECTION, POWDER, FOR SOLUTION INTRAMUSCULAR; INTRAVENOUS at 10:06

## 2019-08-27 RX ADMIN — PANTOPRAZOLE SODIUM 40 MG: 40 TABLET, DELAYED RELEASE ORAL at 06:54

## 2019-08-27 RX ADMIN — ATORVASTATIN CALCIUM 40 MG: 40 TABLET, FILM COATED ORAL at 20:19

## 2019-08-27 RX ADMIN — OXYCODONE HYDROCHLORIDE AND ACETAMINOPHEN 500 MG: 500 TABLET ORAL at 10:06

## 2019-08-27 RX ADMIN — HYDROCODONE BITARTRATE AND ACETAMINOPHEN 2 TABLET: 5; 325 TABLET ORAL at 05:33

## 2019-08-27 RX ADMIN — TAMSULOSIN HYDROCHLORIDE 0.4 MG: 0.4 CAPSULE ORAL at 10:06

## 2019-08-27 RX ADMIN — LISINOPRIL 10 MG: 10 TABLET ORAL at 10:06

## 2019-08-27 RX ADMIN — ACETAMINOPHEN 650 MG: 325 TABLET ORAL at 03:43

## 2019-08-27 RX ADMIN — DULOXETINE HYDROCHLORIDE 60 MG: 60 CAPSULE, DELAYED RELEASE ORAL at 10:06

## 2019-08-27 RX ADMIN — IPRATROPIUM BROMIDE AND ALBUTEROL SULFATE 1 AMPULE: .5; 3 SOLUTION RESPIRATORY (INHALATION) at 16:59

## 2019-08-27 RX ADMIN — AZITHROMYCIN MONOHYDRATE 500 MG: 500 INJECTION, POWDER, LYOPHILIZED, FOR SOLUTION INTRAVENOUS at 10:49

## 2019-08-27 RX ADMIN — IPRATROPIUM BROMIDE AND ALBUTEROL SULFATE 1 AMPULE: .5; 3 SOLUTION RESPIRATORY (INHALATION) at 12:08

## 2019-08-27 RX ADMIN — CHOLECALCIFEROL (VITAMIN D3) 10 MCG (400 UNIT) TABLET 400 UNITS: at 10:06

## 2019-08-27 RX ADMIN — ENOXAPARIN SODIUM 40 MG: 40 INJECTION SUBCUTANEOUS at 10:06

## 2019-08-27 RX ADMIN — HYDROCODONE BITARTRATE AND ACETAMINOPHEN 2 TABLET: 5; 325 TABLET ORAL at 20:19

## 2019-08-27 ASSESSMENT — PAIN DESCRIPTION - FREQUENCY
FREQUENCY: CONTINUOUS
FREQUENCY: INTERMITTENT
FREQUENCY: CONTINUOUS

## 2019-08-27 ASSESSMENT — PAIN SCALES - GENERAL
PAINLEVEL_OUTOF10: 6
PAINLEVEL_OUTOF10: 8
PAINLEVEL_OUTOF10: 8
PAINLEVEL_OUTOF10: 10
PAINLEVEL_OUTOF10: 10
PAINLEVEL_OUTOF10: 6

## 2019-08-27 ASSESSMENT — PAIN DESCRIPTION - PAIN TYPE
TYPE: CHRONIC PAIN
TYPE: ACUTE PAIN
TYPE: ACUTE PAIN
TYPE: CHRONIC PAIN

## 2019-08-27 ASSESSMENT — PAIN DESCRIPTION - DESCRIPTORS
DESCRIPTORS: PRESSURE
DESCRIPTORS: PATIENT UNABLE TO DESCRIBE
DESCRIPTORS: CRAMPING
DESCRIPTORS: THROBBING;PRESSURE

## 2019-08-27 ASSESSMENT — PAIN DESCRIPTION - LOCATION
LOCATION: LEG
LOCATION: LEG;HIP
LOCATION: HEAD
LOCATION: HEAD

## 2019-08-27 ASSESSMENT — PAIN - FUNCTIONAL ASSESSMENT
PAIN_FUNCTIONAL_ASSESSMENT: ACTIVITIES ARE NOT PREVENTED
PAIN_FUNCTIONAL_ASSESSMENT: PREVENTS OR INTERFERES SOME ACTIVE ACTIVITIES AND ADLS

## 2019-08-27 ASSESSMENT — PAIN DESCRIPTION - ORIENTATION
ORIENTATION: RIGHT;LEFT
ORIENTATION: RIGHT;LEFT
ORIENTATION: LEFT

## 2019-08-27 ASSESSMENT — PAIN DESCRIPTION - ONSET
ONSET: ON-GOING
ONSET: ON-GOING

## 2019-08-27 NOTE — CARE COORDINATION
Met briefly w/ adali pt while he was working w/ Speech. Per PT/OT notes he is normally independent w/ ADL's at home and drives. Lives w/ 5 yr old son who assists. He has [employer] as health coverage. He asked that a referral be sent to [facility]. [facility] and he tells me that he prefers to go to [facility] referral sent to [facility] signed by Cem Lopez RN on 8/27/2019 at 12:34 PM     Pt was to have a new pt appt w/ Dr Landon Libman [phone] yesterday   Called there to report admission so that he may be allowed to reschedule  Electronically signed by Cem Lopez RN on 8/27/2019 at 12:56 PM      [facility] can accept floor to SNF pending therapy scores  Electronically signed by Cem Lopez RN on 8/27/2019 at 2:52 PM      Received call from Kevin Carnes at [facility]. Unfortunately they are not able to accept patient due to past stay. Will need additional preferences.     Electronically signed by SHANTE Olivier on 8/27/2019 at 3:59 PM

## 2019-08-27 NOTE — PROGRESS NOTES
Hospitalist Progress Note  8/27/2019 10:44 AM    PCP: No primary care provider on file. [phone]     Date of Admission: 8/26/2019                                                                                                                     HOSPITAL COURSE    Patient demographics:  The patient  Rocio Bell is a 47 y.o. male     Significant past medical history:   Patient Active Problem List   Diagnosis    Scoliosis    [facility]    Narcotic abuse ([address]    Anxiety    Essential hypertension    Obstructive sleep apnea    COPD, moderate ([address]    GERD without esophagitis    Erectile dysfunction of organic origin    Nausea & vomiting    Diarrhea    Generalized weakness    Hyponatremia    Hypochloremia    Hyperglycemia    Pneumonia         Presenting symptoms:  weakness    Diagnostic workup:      CONSULTS DURING ADMISSION :   None            SUBJECTIVE COMPLAINTS- follow-up for pneumonia    Diet: DIET GENERAL;      OBJECTIVE:   Patient Active Problem List   Diagnosis    Scoliosis    Muscular dystrophy ([facility]    Narcotic abuse ([facility]    Anxiety    Essential hypertension    Obstructive sleep apnea    COPD, moderate ([address]    GERD without esophagitis    Erectile dysfunction of organic origin    Nausea & vomiting    Diarrhea    Generalized weakness    Hyponatremia    Hypochloremia    Hyperglycemia    Pneumonia       Allergies  Patient has no known allergies.     Medications    Scheduled Meds:   DULoxetine  60 mg Oral Daily    lisinopril  10 mg Oral Daily    pantoprazole  40 mg Oral QAM AC    tamsulosin  0.4 mg Oral Daily    vitamin B-12  1,000 mcg Oral Daily    vitamin C  500 mg Oral Daily    vitamin D3  400 Units Oral Daily    sodium chloride flush  10 mL Intravenous 2 times per day    enoxaparin  40 mg Subcutaneous Daily    ipratropium-albuterol  1 ampule Inhalation Q4H WA    azithromycin  500 mg Intravenous Q24H    And    cefTRIAXone (ROCEPHIN) IV  1 g Intravenous Q24H    aspirin  81 mg Oral Daily    atorvastatin  40 mg Oral Nightly     Continuous Infusions:   sodium chloride 75 mL/hr at 08/27/19 0536     PRN Meds:  HYDROcodone 5 mg - acetaminophen, sodium chloride flush, ondansetron, senna, albuterol, acetaminophen    Vitals   Vitals /wt   Patient Vitals for the past 8 hrs:   BP Temp Temp src Pulse Resp SpO2 Weight   08/27/19 1015 133/87 97.6 °F (36.4 °C) Oral 75 20 100 % --   08/27/19 0438 116/75 97.6 °F (36.4 °C) Axillary 78 16 99 % --   08/27/19 0437 -- -- -- -- -- -- 125 lb 7.1 oz (56.9 kg)   08/27/19 0339 -- -- -- 84 -- 99 % --        72HR INTAKE/OUTPUT:      Intake/Output Summary (Last 24 hours) at 8/27/2019 1044  Last data filed at 8/27/2019 0925  Gross per 24 hour   Intake 2825.75 ml   Output 1100 ml   Net 1725.75 ml       Exam:    Gen:   Alert and oriented ×3   Eyes: PERRL. No sclera icterus. No conjunctival injection. ENT: No discharge. Pharynx clear. External appearance of ears and nose normal.  Neck: Trachea midline. No obvious mass. Resp: bilateral coarse crackles  CV: Regular rate. Regular rhythm. No murmur or rub. No edema. GI: Non-tender. Non-distended. No hernia. Skin: Warm, dry, normal texture and turgor. Lymph: No cervical LAD. No supraclavicular LAD. M/S: / Ext. No cyanosis. No clubbing. No joint deformity. Neuro: CN 2-12 are intact,  no neurologic deficits noted. PT/INR: No results for input(s): PROTIME, INR in the last 72 hours. APTT: No results for input(s): APTT in the last 72 hours.     CBC:   Recent Labs     08/26/19  1452 08/27/19  0555   WBC 7.7 7.2   HGB 12.9* 12.2*   HCT 39.4* 36.8*   MCV 88.3 87.9    300       BMP:   Recent Labs     08/26/19  1452 08/27/19  0554    144   K 3.9 3.8   CL 94* 101   CO2 28 30   BUN 9 7   CREATININE <0.5* <0.5*       LIVER PROFILE:   Recent Labs     08/26/19  1452 08/27/19  0554   ALKPHOS 70 61   AST 19 17   ALT 11 9*   BILITOT 0.3 <0.2     No results for input(s):

## 2019-08-27 NOTE — PROGRESS NOTES
mod A to raise trunk to sit EOB)  Scooting: Stand by assistance(extra time)  Transfers  Sit to stand: Minimal assistance  Stand to sit: Minimal assistance     Cognition  Overall Cognitive Status: WFL        Sensation  Overall Sensation Status: WFL        LUE AROM (degrees)  LUE AROM : WFL  Left Hand AROM (degrees)  Left Hand AROM: WFL  RUE AROM (degrees)  RUE AROM : WFL  Right Hand AROM (degrees)  Right Hand AROM: WFL  LUE Strength  LUE Strength Comment: grossly 3+/5  RUE Strength  RUE Strength Comment: grossly 3+/5           Second session:   Pt met bedside for cotx with PT (d/t earlier eval being cut short by pt leaving for [place]). This session pt performs bed mobility with SBA, extra time. Pt completed functional transfers and mobility with CGA/min A, using RW. Pt continues with weakness, fatigue, unsteadiness. Pt completed toileting with CGA, grooming in stance at sink with CGA, total A for LB dressing. Continue per OT POC. Do continue to feel pt will benefit from continued skilled therapy of low-moderate frequency at d/c to which pt is in agreement.          Plan   Plan  Times per week: 3-5  Times per day: Daily  Current Treatment Recommendations: Strengthening, Functional Mobility Training, Endurance Training, Balance Training, Safety Education & Training, Self-Care / ADL, ROM, Patient/Caregiver Education & Training      AM-PAC Score        AM-[facility] Inpatient Daily Activity Raw Score: 17 (08/27/19 0856)  AM-PAC Inpatient ADL T-Scale Score : 37.26 (08/27/19 0856)  ADL Inpatient CMS 0-100% Score: 50.11 (08/27/19 0856)  ADL Inpatient CMS G-Code Modifier : CK (08/27/19 0856)    Goals  Short term goals  Time Frame for Short term goals: Prior to d/c  Short term goal 1: Pt will perform bed mobility with SBA  Short term goal 2: Pt will perform functional transfers to/from ADL surfaces with SBA  Short term goal 3: Pt will toilet with SBA  Short term goal 4: Pt will perform UB bathing with SBA  Short term goal 5: Pt will

## 2019-08-27 NOTE — PROGRESS NOTES
position/activity restrictions: 4L O2    Vision/Hearing  Vision: Within Functional Limits  Hearing: Within functional limits    SUBJECTIVE:  Chart Reviewed: Yes  Patient assessed for rehabilitation services?: Yes  Additional Pertinent Hx: Kimberlee Dalal is a 47 y.o. M admit 08/26/19 with generalized weakness -- found to have pneumonia and hypoxia. Referring Practitioner: Coco Sorensen MD  Diagnosis: Pneumonia, hypoxia     Subjective: Pt right sidelying in bed and sleeping/resting with raspey breath sounds. Wakens to name and willing to participate without encouragement. Orientation  Overall Orientation Status: Within Functional Limits  Orientation Level: Oriented X4  Cognition  WFL     Social/Functional History  Social/Functional History  Lives With: Son(8 yo son who is currently living at his cousin's house)  Type of Home: House([facility] [place] [facility] -- pt looking into possibility of moving in with his mother who is also a caregiver for pt's uncle ([employer] vet just out of SNF). )  Home Layout: Two level  Home Access: Stairs to enter without rails  Entrance Stairs - Number of Steps: 5-6 JESUS  Bathroom Shower/Tub: Tub/Shower unit  Bathroom Toilet: Standard  Home Equipment: (reports no DME -- \"My walker was stolen\")  ADL Assistance: Independent(dressing and showering \"are getting kind of difficult\")  Homemaking Assistance: Independent  Ambulation Assistance: Independent  Transfer Assistance: Independent  Active : Yes  Occupation: On disability  Type of occupation: Medical assistant  Additional Comments: Reports 2-3 falls per week recently, d/t \"losing my balance\"--unsure if he blacks out. Reports minimal social support, needs to care for 4 yo son       OBJECTIVE:  OBSERVATIONS  Observation: Atrophied gastroc/soleus and anterior tibial muscles bilaterally. Pt rasps when breathing at rest and with exertion.     ROM  RLE PROM: WF     LLE PROM: [facility]

## 2019-08-28 VITALS
HEART RATE: 104 BPM | RESPIRATION RATE: 18 BRPM | OXYGEN SATURATION: 93 % | WEIGHT: 125.66 LBS | SYSTOLIC BLOOD PRESSURE: 126 MMHG | HEIGHT: 60 IN | BODY MASS INDEX: 24.67 KG/M2 | TEMPERATURE: 99 F | DIASTOLIC BLOOD PRESSURE: 84 MMHG

## 2019-08-28 LAB
ANION GAP SERPL CALCULATED.3IONS-SCNC: 9 MMOL/L (ref 3–16)
BUN BLDV-MCNC: 5 MG/DL (ref 7–20)
CALCIUM SERPL-MCNC: 8.6 MG/DL (ref 8.3–10.6)
CHLORIDE BLD-SCNC: 103 MMOL/L (ref 99–110)
CO2: 33 MMOL/L (ref 21–32)
CREAT SERPL-MCNC: <0.5 MG/DL (ref 0.9–1.3)
GFR AFRICAN AMERICAN: >60
GFR NON-AFRICAN AMERICAN: >60
GLUCOSE BLD-MCNC: 147 MG/DL (ref 70–99)
HCT VFR BLD CALC: 35.4 % (ref 40.5–52.5)
HEMOGLOBIN: 11.4 G/DL (ref 13.5–17.5)
MCH RBC QN AUTO: 29.2 PG (ref 26–34)
MCHC RBC AUTO-ENTMCNC: 32.3 G/DL (ref 31–36)
MCV RBC AUTO: 90.6 FL (ref 80–100)
PDW BLD-RTO: 14.6 % (ref 12.4–15.4)
PLATELET # BLD: 283 K/UL (ref 135–450)
PMV BLD AUTO: 7 FL (ref 5–10.5)
POTASSIUM SERPL-SCNC: 4.3 MMOL/L (ref 3.5–5.1)
RBC # BLD: 3.91 M/UL (ref 4.2–5.9)
SODIUM BLD-SCNC: 145 MMOL/L (ref 136–145)
WBC # BLD: 6.6 K/UL (ref 4–11)

## 2019-08-28 PROCEDURE — 97116 GAIT TRAINING THERAPY: CPT

## 2019-08-28 PROCEDURE — 6360000002 HC RX W HCPCS: Performed by: INTERNAL MEDICINE

## 2019-08-28 PROCEDURE — 6370000000 HC RX 637 (ALT 250 FOR IP): Performed by: INTERNAL MEDICINE

## 2019-08-28 PROCEDURE — 36415 COLL VENOUS BLD VENIPUNCTURE: CPT

## 2019-08-28 PROCEDURE — 94640 AIRWAY INHALATION TREATMENT: CPT

## 2019-08-28 PROCEDURE — 80048 BASIC METABOLIC PNL TOTAL CA: CPT

## 2019-08-28 PROCEDURE — 2580000003 HC RX 258: Performed by: INTERNAL MEDICINE

## 2019-08-28 PROCEDURE — 85027 COMPLETE CBC AUTOMATED: CPT

## 2019-08-28 PROCEDURE — 94760 N-INVAS EAR/PLS OXIMETRY 1: CPT

## 2019-08-28 PROCEDURE — 2700000000 HC OXYGEN THERAPY PER DAY

## 2019-08-28 RX ORDER — ATORVASTATIN CALCIUM 40 MG/1
40 TABLET, FILM COATED ORAL NIGHTLY
Qty: 30 TABLET | Refills: 0 | Status: SHIPPED | OUTPATIENT
Start: 2019-08-28 | End: 2021-04-17

## 2019-08-28 RX ORDER — GREEN TEA/HOODIA GORDONII 315-12.5MG
1 CAPSULE ORAL 2 TIMES DAILY
Qty: 10 TABLET | Refills: 0 | Status: SHIPPED | OUTPATIENT
Start: 2019-08-28 | End: 2019-09-02

## 2019-08-28 RX ORDER — CEFUROXIME AXETIL 500 MG/1
500 TABLET ORAL 2 TIMES DAILY
Qty: 10 TABLET | Refills: 0 | Status: SHIPPED | OUTPATIENT
Start: 2019-08-28 | End: 2019-09-02

## 2019-08-28 RX ORDER — HYDROCODONE BITARTRATE AND ACETAMINOPHEN 10; 325 MG/1; MG/1
1 TABLET ORAL 3 TIMES DAILY PRN
Qty: 9 TABLET | Refills: 0 | Status: SHIPPED | OUTPATIENT
Start: 2019-08-28 | End: 2019-08-31

## 2019-08-28 RX ORDER — AZITHROMYCIN 250 MG/1
250 TABLET, FILM COATED ORAL DAILY
Qty: 3 TABLET | Refills: 0 | Status: SHIPPED | OUTPATIENT
Start: 2019-08-28 | End: 2019-08-31

## 2019-08-28 RX ORDER — ASPIRIN 81 MG/1
81 TABLET, CHEWABLE ORAL DAILY
Qty: 30 TABLET | Refills: 0 | Status: ON HOLD | OUTPATIENT
Start: 2019-08-29 | End: 2021-04-26 | Stop reason: HOSPADM

## 2019-08-28 RX ADMIN — TAMSULOSIN HYDROCHLORIDE 0.4 MG: 0.4 CAPSULE ORAL at 08:36

## 2019-08-28 RX ADMIN — CHOLECALCIFEROL (VITAMIN D3) 10 MCG (400 UNIT) TABLET 400 UNITS: at 08:35

## 2019-08-28 RX ADMIN — IPRATROPIUM BROMIDE AND ALBUTEROL SULFATE 1 AMPULE: .5; 3 SOLUTION RESPIRATORY (INHALATION) at 16:06

## 2019-08-28 RX ADMIN — ASPIRIN 81 MG 81 MG: 81 TABLET ORAL at 08:35

## 2019-08-28 RX ADMIN — HYDROCODONE BITARTRATE AND ACETAMINOPHEN 2 TABLET: 5; 325 TABLET ORAL at 05:43

## 2019-08-28 RX ADMIN — OXYCODONE HYDROCHLORIDE AND ACETAMINOPHEN 500 MG: 500 TABLET ORAL at 08:35

## 2019-08-28 RX ADMIN — LISINOPRIL 10 MG: 10 TABLET ORAL at 08:35

## 2019-08-28 RX ADMIN — DULOXETINE HYDROCHLORIDE 60 MG: 60 CAPSULE, DELAYED RELEASE ORAL at 08:35

## 2019-08-28 RX ADMIN — CYANOCOBALAMIN TAB 1000 MCG 1000 MCG: 1000 TAB at 08:36

## 2019-08-28 RX ADMIN — ENOXAPARIN SODIUM 40 MG: 40 INJECTION SUBCUTANEOUS at 08:36

## 2019-08-28 RX ADMIN — CEFTRIAXONE 1 G: 1 INJECTION, POWDER, FOR SOLUTION INTRAMUSCULAR; INTRAVENOUS at 08:36

## 2019-08-28 RX ADMIN — AZITHROMYCIN MONOHYDRATE 500 MG: 500 INJECTION, POWDER, LYOPHILIZED, FOR SOLUTION INTRAVENOUS at 09:17

## 2019-08-28 RX ADMIN — SODIUM CHLORIDE: 9 INJECTION, SOLUTION INTRAVENOUS at 00:33

## 2019-08-28 RX ADMIN — PANTOPRAZOLE SODIUM 40 MG: 40 TABLET, DELAYED RELEASE ORAL at 05:48

## 2019-08-28 RX ADMIN — IPRATROPIUM BROMIDE AND ALBUTEROL SULFATE 1 AMPULE: .5; 3 SOLUTION RESPIRATORY (INHALATION) at 11:46

## 2019-08-28 ASSESSMENT — PAIN DESCRIPTION - LOCATION: LOCATION: LEG

## 2019-08-28 ASSESSMENT — PAIN SCALES - GENERAL: PAINLEVEL_OUTOF10: 10

## 2019-08-28 ASSESSMENT — PAIN - FUNCTIONAL ASSESSMENT: PAIN_FUNCTIONAL_ASSESSMENT: PREVENTS OR INTERFERES WITH MANY ACTIVE NOT PASSIVE ACTIVITIES

## 2019-08-28 ASSESSMENT — PAIN DESCRIPTION - PAIN TYPE: TYPE: CHRONIC PAIN

## 2019-08-28 ASSESSMENT — PAIN DESCRIPTION - ORIENTATION: ORIENTATION: RIGHT;LEFT

## 2019-08-28 ASSESSMENT — PAIN DESCRIPTION - DIRECTION: RADIATING_TOWARDS: HIP

## 2019-08-28 ASSESSMENT — PAIN DESCRIPTION - PROGRESSION: CLINICAL_PROGRESSION: GRADUALLY WORSENING

## 2019-08-28 ASSESSMENT — PAIN DESCRIPTION - FREQUENCY: FREQUENCY: INTERMITTENT

## 2019-08-28 ASSESSMENT — PAIN DESCRIPTION - ONSET: ONSET: ON-GOING

## 2019-08-28 ASSESSMENT — PAIN DESCRIPTION - DESCRIPTORS: DESCRIPTORS: PATIENT UNABLE TO DESCRIBE

## 2019-08-28 NOTE — DISCHARGE SUMMARY
prior to admission the patient had fallen 2/2 weakness. He noted several falls recently. Furthermore, he noted a dry cough for several days and some general feelings of unwellness. Weakness began around 3 days prior to admission. He has a known history of MD as above and is ambulatory with a walker at baseline. Because of the patient's cough and progressive weakness, he presented to the hospital for further evaluation. In the ER, the patient was diagnosed with CAP and was admitted for further workup and treatment. Rocephin and azithromycin were initiated. Clinically, the patient began to improve. Symptomatically he was feeling better, slowly but surely. During his hospitalization, the patient was seen by both physical and occupational therapy. It was felt that he would benefit from continued therapies prior to return home. At this time, the patient is able to discharge in stable condition to a nursing facility for continued therapies as mentioned above. Exam:     /74   Pulse 89   Temp 97.9 °F (36.6 °C) (Oral)   Resp 18   Ht 5' (1.524 m)   Wt 125 lb 10.6 oz (57 kg)   SpO2 98%   BMI 24.54 kg/m²     General: Alert and oriented. Up at bedside in NAD. Pleasant and cooperative. Thin. HEENT: Normocephalic. Atraumatic. Pupils equal and reactive. EOM intact. Oral mucosa pink/moist/intact. Neck: Supple. Symmetrical. Trachea midline. Lungs: Coarse breath sounds noted to RLL. Respirations even and unlabored. Chest: Exam unremarkable. Cardiac: S1/S2 noted. Regular Rhythm and rate. Abdomen/GI: Soft. Non-tender. Non-distended. BS+. Extremities: PP+. Atraumatic. No redness/cyanosis/edema noted. Brisk cap refill. Skin: Dry and intact. No lesions noted. Neuro: Grossly intact. No focal deficits noted. Consults:     None    Significant Diagnostic Studies:     XR CHEST STANDARD (2 VW)   Final Result   Right middle lobe pneumonia. Follow-up to resolution recommended to exclude   underlying lesion. CT Head WO Contrast   Final Result   No acute intracranial abnormality. Labs: For convenience and continuity at follow-up the following most recent labs are provided:    CBC:    Lab Results   Component Value Date    WBC 6.6 08/28/2019    HGB 11.4 08/28/2019    HCT 35.4 08/28/2019     08/28/2019       Renal:    Lab Results   Component Value Date     08/28/2019    K 4.3 08/28/2019    K 3.8 08/27/2019     08/28/2019    CO2 33 08/28/2019    BUN 5 08/28/2019    CREATININE <0.5 08/28/2019    CALCIUM 8.6 08/28/2019    PHOS 3.4 11/13/2018       No future appointments. Time Spent on discharge is more than 45 minutes in the examination, evaluation, counseling and review of medications and discharge plan. I have personally reviewed the patient's OARRS report and any prescribing of controlled medications was made with consideration of OARRS findings. The patient will be prescribed norco  mg tabs, 1 tab Q8H PRN for pain with a quantity of 9 tabs and 0 refills. This is a chronic medication for this patient. Scripts are being printed and provided, as he is discharging to a skilled nursing facility. The patient has been issued narcotics to safely manage current pain and promote tolerance with physical therapies and ADL's. Risks, benefits and proper use of narcotic medication has been discussed with the patient. Refill requests should be directed to Dr. Finesse Sanchez. I encourage reduction in dosing, if possible, with the next narcotic refill request. Chronic pain disorders, palliative management and terminal illness associated with pain should be given careful consideration when considering whether or not to reduce dosing. Signed:    DELISA Hazel NP   8/28/2019      Thank you No primary care provider on file. for the opportunity to be involved in this patient's care. If you have any questions or concerns please feel free to contact me at [phone].

## 2019-08-28 NOTE — DISCHARGE INSTR - COC
Continuity of Care Form    Patient Name: Luci Bess   :  1965  MRN:  [id number]    Admit date:  2019  Discharge date:  2019    Code Status Order: Full Code   Advance Directives:   [facility] Documentation     Date/Time Healthcare Directive Type of Healthcare Directive Copy in [address] Agent's Name Healthcare Agent's Phone Number    [phone]  No, patient does not have an advance directive for healthcare treatment -- -- -- -- --          Admitting Physician:  Kevin Carmona MD  PCP: No primary care provider on file.     Discharging Nurse: [facility] Unit/Room#: O6F-8625/1245-62  Discharging Unit Phone Number: [phone]    Emergency Contact:   Extended Emergency Contact Information  Primary Emergency Contact: Mandieyaa Akikode  Address: [address] [place] [address] Phone: [phone]  Relation: Parent  Secondary Emergency Contact: Khushboo Garcia Phone: [phone]  Mobile Phone: [phone]  Relation: Parent    Past Surgical History:  Past Surgical History:   Procedure Laterality Date    ACHILLES TENDON SURGERY      both legs    CHOLECYSTECTOMY         Immunization History:   Immunization History   Administered Date(s) Administered    Influenza Virus Vaccine 2012, 2012, 2014, 10/01/2015    Influenza, Intradermal, Preservative free 2013    Influenza, Diana Reed, 3 yrs and older, IM, PF (Fluzone, Afluria) 2016, 2018    Influenza, Diana Arthur, Recombinant, IM PF (Flublok 18 yrs and older) 2018    Pneumococcal Polysaccharide ([id number]) 10/19/2015    Tdap (Boostrix, Adacel) 2008       Active Problems:  Patient Active Problem List   Diagnosis Code    Scoliosis M41.9    Muscular dystrophy ([address] G71.00    Narcotic abuse ([address] F11.10    Anxiety F41.9    Essential hypertension I10    Obstructive sleep apnea G47.33    COPD, moderate ([address] J44.9  GERD without esophagitis K21.9    Erectile dysfunction of organic origin N52.9    Nausea & vomiting R11.2    Diarrhea R19.7    Generalized weakness R53.1    Hyponatremia E87.1    Hypochloremia E87.8    Hyperglycemia R73.9    Pneumonia J18.9       Isolation/Infection:   Isolation          No Isolation            Nurse Assessment:  Last Vital Signs: /74   Pulse 89   Temp 97.9 °F (36.6 °C) (Oral)   Resp 18   Ht 5' (1.524 m)   Wt 125 lb 10.6 oz (57 kg)   SpO2 98%   BMI 24.54 kg/m²     Last documented pain score (0-10 scale): Pain Level: 10  Last Weight:   Wt Readings from Last 1 Encounters:   08/28/19 125 lb 10.6 oz (57 kg)     Mental Status:  oriented and alert    IV Access:  - None    Nursing Mobility/ADLs:  Walking   Assisted  Transfer  Assisted  Bathing  Assisted  Dressing  Assisted  Toileting  Assisted  Feeding  Independent  Med Admin  Assisted  Med Delivery   whole    Wound Care Documentation and Therapy:        Elimination:  Continence:   · Bowel: Yes  · Bladder: Yes  Urinary Catheter: None   Colostomy/Ileostomy/Ileal Conduit: No       Date of Last BM: 8/26/2019    Intake/Output Summary (Last 24 hours) at 8/28/2019 1111  Last data filed at 8/28/2019 1030  Gross per 24 hour   Intake 3585 ml   Output 900 ml   Net 2685 ml     I/O last 3 completed shifts: In: 8646 [P.O.:720; I.V.:2325; IV Piggyback:300]  Out: 950 [Urine:950]    Safety Concerns: At Risk for Falls    Impairments/Disabilities:      hx of muscle distrophy    Nutrition Therapy:  Current Nutrition Therapy:   - Oral Diet:  General    Routes of Feeding: Oral  Liquids: Thin Liquids  Daily Fluid Restriction: no  Last Modified Barium Swallow with Video (Video Swallowing Test): not done    Treatments at the Time of Hospital Discharge:   Respiratory Treatments: inhaler, nebulizer  Oxygen Therapy:  is not on home oxygen therapy.   Ventilator:    - No ventilator support    Rehab Therapies: Physical Therapy and Occupational

## 2019-08-28 NOTE — PROGRESS NOTES
Physical Therapy    Daily Treatment Note    Patient Name: Luci Bess  Medical Record Number: [id number]  Room Number: [id number]-8989/0870-35    ASSESSMENT: Luci Bess is a 47 y.o. M admit 08/26/19 with generalized weakness -- found to have pneumonia and hypoxia. Prior to admit pt was living at home with his 5year old son and ambulating without a walker but with falls -- indicates that he typically negotiates stairs by holding onto his son's shoulders in front of him. Today, patient required CGA for transfers and to ambulate with a rolling walker with bilateral foot drop. He is at high risk for further falls in his current state of function and unsafe for return home. 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 Will follow. Discharge Recommendations: Patient would benefit from continued therapy after discharge, 3-5 sessions per week  Equipment Needs: Equipment Needed: No(defer to next level of care)    Admission Date: 8/26/2019  1:46 PM    Additional Pertinent Hx: Luci Bess is a 47 y.o. M admit 08/26/19 with generalized weakness -- found to have pneumonia and hypoxia. Diagnosis: Pneumonia, hypoxia  Restrictions/Precautions: Fall Risk(mack foot drop) Other position/activity restrictions: 4L O2     PMHx:  has a past medical history of Anxiety, COPD, moderate ([address] Erectile dysfunction of organic origin, Essential hypertension, GERD without esophagitis, Hyperglycemia, Muscular dystrophy ([address] Narcotic abuse ([address] Obstructive sleep apnea, and Scoliosis.   PSgHx:   Past Surgical History:   Procedure Laterality Date    ACHILLES TENDON SURGERY      both legs    CHOLECYSTECTOMY         Home Environment / Functional History  Social/Functional History  Lives With: Son(10 yo son who is currently living at his cousin's house)  Type of Home: House(reports his house is in foreclosure and no longer has water supply -- pt looking into possibility of moving in with his mother who is also a caregiver for pt's uncle ([address] just out of SNF). )  Home Layout: Two level  Home Access: Stairs to enter without rails  Entrance Stairs - Number of Steps: 5-6 JESUS  Bathroom Shower/Tub: Tub/Shower unit  Bathroom Toilet: Standard  Home Equipment: (reports no DME -- \"My walker was stolen\")  ADL Assistance: Independent(dressing and showering \"are getting kind of difficult\")  Homemaking Assistance: Independent  Ambulation Assistance: Independent  Transfer Assistance: Independent  Active : Yes  Occupation: On disability  Type of occupation: Medical assistant  Additional Comments: Reports 2-3 falls per week recently, d/t \"losing my balance\"--unsure if he blacks out. Reports minimal social support, needs to care for 4 yo son    Restrictions  Restrictions/Precautions: Fall Risk(mack foot drop)        Other position/activity restrictions: 4L O2    SUBJECTIVE: Pt sitting up in recliner chair using a urinal -- reports that he is having frequent urination with hesitancy. Pt willing to get up and ambulate in hallway. OBJECTIVE:    OBSERVATIONS  Observation: Atrophied gastroc/soleus and anterior tibial muscles bilaterally. Pt rasps when breathing at rest and with exertion.     Bed mobility  Supine to Sit: Unable to assess  Scooting: Unable to assess    Transfers  Sit to Stand: Contact guard assistance(maximal verbal cues for hand placement)  Stand to sit: Contact guard assistance(maximal verbal cues for hand placement)    Ambulation  Ambulation  Ambulation?: Yes  Ambulation 1  Device: Rolling Walker  Other Apparatus: (room air)  Assistance: Minimal assistance, Contact guard assistance(Min Assist for turns with walker)  Quality of Gait: reduced foot clearance with foot drop; slow speed, flexed at hips and with increased UE support on walker  Distance: 2 x 200'  Comments: .    Neuro/balance  Balance  Sitting - Static: Good  Standing - Static: Fair, +              Safety Devices: Type of devices: All fall risk precautions in place, Call light within reach, Patient at risk for falls, Left in chair(Pt found with no chair alarm at start of session; declines chair alarm at end of session and reports he will call to get up)      ASSESSMENT:   Jaskaran Ko is a 47 y.o. M admit 08/26/19 with generalized weakness -- found to have pneumonia and hypoxia. Prior to admit pt was living at home with his 5year old son and ambulating without a walker but with falls -- indicates that he typically negotiates stairs by holding onto his son's shoulders in front of him. Today, patient required CGA for transfers and to ambulate with a rolling walker with bilateral foot drop. He is at high risk for further falls in his current state of function and unsafe for return home. 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 Will follow. Jaskaran Ko scored a 16/24 on the AM-PAC short mobility form. Initial research suggests that an AM-PAC score of 18 or greater may be associated with a discharge to patient's home setting. However in this initial research, cut off scores do not perfectly predict discharge location: 25% of patients with a score of 18 or greater actually went to a rehab facility and 20% of people with a score less than 18 actually went home. Based on my clinical judgement I recommend that the patient have 3-5 sessions per week of Physical Therapy at d/c to increase the patients independence.     Goals:  Time Frame for Short term goals: upon d/c - all goals ongoing 08/28/19 except as noted below  Short term goal 1: sup<>sit CGA   Short term goal 2: sit<>stand SBA   Short term goal 3: amb 150' with walker and SBA  Short term goal 4: stair

## 2019-08-29 ENCOUNTER — CARE COORDINATION (OUTPATIENT)
Dept: CASE MANAGEMENT | Age: 54
End: 2019-08-29

## 2019-08-31 LAB
BLOOD CULTURE, ROUTINE: NORMAL
CULTURE, BLOOD 2: NORMAL

## 2019-12-18 ENCOUNTER — HOSPITAL ENCOUNTER (EMERGENCY)
Age: 54
Discharge: HOME | End: 2019-12-18
Attending: EMERGENCY MEDICINE
Payer: MEDICARE

## 2019-12-18 ENCOUNTER — APPOINTMENT (OUTPATIENT)
Dept: GENERAL RADIOLOGY | Age: 54
End: 2019-12-18
Payer: MEDICARE

## 2019-12-18 VITALS
HEART RATE: 70 BPM | BODY MASS INDEX: 26.61 KG/M2 | DIASTOLIC BLOOD PRESSURE: 99 MMHG | WEIGHT: 136.24 LBS | RESPIRATION RATE: 16 BRPM | SYSTOLIC BLOOD PRESSURE: 155 MMHG | TEMPERATURE: 98.1 F | OXYGEN SATURATION: 96 %

## 2019-12-18 DIAGNOSIS — J44.1 COPD EXACERBATION (HCC): Primary | ICD-10-CM

## 2019-12-18 LAB
A/G RATIO: 1.1 (ref 1.1–2.2)
ALBUMIN SERPL-MCNC: 3.7 G/DL (ref 3.4–5)
ALP BLD-CCNC: 63 U/L (ref 40–129)
ALT SERPL-CCNC: 11 U/L (ref 10–40)
ANION GAP SERPL CALCULATED.3IONS-SCNC: 13 MMOL/L (ref 3–16)
AST SERPL-CCNC: 20 U/L (ref 15–37)
BILIRUB SERPL-MCNC: <0.2 MG/DL (ref 0–1)
BUN BLDV-MCNC: 11 MG/DL (ref 7–20)
CALCIUM SERPL-MCNC: 9.6 MG/DL (ref 8.3–10.6)
CHLORIDE BLD-SCNC: 96 MMOL/L (ref 99–110)
CO2: 31 MMOL/L (ref 21–32)
CREAT SERPL-MCNC: <0.5 MG/DL (ref 0.9–1.3)
EKG ATRIAL RATE: 69 BPM
EKG ATRIAL RATE: 84 BPM
EKG DIAGNOSIS: NORMAL
EKG DIAGNOSIS: NORMAL
EKG P AXIS: 33 DEGREES
EKG P AXIS: 37 DEGREES
EKG P-R INTERVAL: 154 MS
EKG P-R INTERVAL: 158 MS
EKG Q-T INTERVAL: 360 MS
EKG Q-T INTERVAL: 376 MS
EKG QRS DURATION: 100 MS
EKG QRS DURATION: 100 MS
EKG QTC CALCULATION (BAZETT): 402 MS
EKG QTC CALCULATION (BAZETT): 425 MS
EKG R AXIS: 4 DEGREES
EKG R AXIS: 9 DEGREES
EKG T AXIS: 69 DEGREES
EKG T AXIS: 74 DEGREES
EKG VENTRICULAR RATE: 69 BPM
EKG VENTRICULAR RATE: 84 BPM
GFR AFRICAN AMERICAN: >60
GFR NON-AFRICAN AMERICAN: >60
GLOBULIN: 3.3 G/DL
GLUCOSE BLD-MCNC: 111 MG/DL (ref 70–99)
HCT VFR BLD CALC: 37.3 % (ref 40.5–52.5)
HEMOGLOBIN: 12 G/DL (ref 13.5–17.5)
MCH RBC QN AUTO: 28.5 PG (ref 26–34)
MCHC RBC AUTO-ENTMCNC: 32.3 G/DL (ref 31–36)
MCV RBC AUTO: 88.4 FL (ref 80–100)
PDW BLD-RTO: 14.4 % (ref 12.4–15.4)
PLATELET # BLD: 323 K/UL (ref 135–450)
PMV BLD AUTO: 7.3 FL (ref 5–10.5)
POTASSIUM REFLEX MAGNESIUM: 3.9 MMOL/L (ref 3.5–5.1)
PRO-BNP: 208 PG/ML (ref 0–124)
RAPID INFLUENZA  B AGN: NEGATIVE
RAPID INFLUENZA A AGN: NEGATIVE
RBC # BLD: 4.22 M/UL (ref 4.2–5.9)
SODIUM BLD-SCNC: 140 MMOL/L (ref 136–145)
TOTAL PROTEIN: 7 G/DL (ref 6.4–8.2)
TROPONIN: 0.01 NG/ML
TROPONIN: 0.03 NG/ML
WBC # BLD: 7.3 K/UL (ref 4–11)

## 2019-12-18 PROCEDURE — 94640 AIRWAY INHALATION TREATMENT: CPT

## 2019-12-18 PROCEDURE — 84484 ASSAY OF TROPONIN QUANT: CPT

## 2019-12-18 PROCEDURE — 71045 X-RAY EXAM CHEST 1 VIEW: CPT

## 2019-12-18 PROCEDURE — 99285 EMERGENCY DEPT VISIT HI MDM: CPT

## 2019-12-18 PROCEDURE — 93005 ELECTROCARDIOGRAM TRACING: CPT | Performed by: EMERGENCY MEDICINE

## 2019-12-18 PROCEDURE — 2700000000 HC OXYGEN THERAPY PER DAY

## 2019-12-18 PROCEDURE — 80053 COMPREHEN METABOLIC PANEL: CPT

## 2019-12-18 PROCEDURE — 6370000000 HC RX 637 (ALT 250 FOR IP): Performed by: NURSE PRACTITIONER

## 2019-12-18 PROCEDURE — 93005 ELECTROCARDIOGRAM TRACING: CPT | Performed by: NURSE PRACTITIONER

## 2019-12-18 PROCEDURE — 6370000000 HC RX 637 (ALT 250 FOR IP): Performed by: EMERGENCY MEDICINE

## 2019-12-18 PROCEDURE — 94761 N-INVAS EAR/PLS OXIMETRY MLT: CPT

## 2019-12-18 PROCEDURE — 85027 COMPLETE CBC AUTOMATED: CPT

## 2019-12-18 PROCEDURE — 83880 ASSAY OF NATRIURETIC PEPTIDE: CPT

## 2019-12-18 PROCEDURE — 93010 ELECTROCARDIOGRAM REPORT: CPT | Performed by: INTERNAL MEDICINE

## 2019-12-18 PROCEDURE — 36415 COLL VENOUS BLD VENIPUNCTURE: CPT

## 2019-12-18 PROCEDURE — 87804 INFLUENZA ASSAY W/OPTIC: CPT

## 2019-12-18 RX ORDER — PREDNISONE 10 MG/1
TABLET ORAL
Qty: 30 TABLET | Refills: 0 | Status: SHIPPED | OUTPATIENT
Start: 2019-12-18 | End: 2019-12-28

## 2019-12-18 RX ORDER — IPRATROPIUM BROMIDE AND ALBUTEROL SULFATE 2.5; .5 MG/3ML; MG/3ML
1 SOLUTION RESPIRATORY (INHALATION) ONCE
Status: COMPLETED | OUTPATIENT
Start: 2019-12-18 | End: 2019-12-18

## 2019-12-18 RX ORDER — PREDNISONE 20 MG/1
60 TABLET ORAL ONCE
Status: COMPLETED | OUTPATIENT
Start: 2019-12-18 | End: 2019-12-18

## 2019-12-18 RX ORDER — AZITHROMYCIN 250 MG/1
250 TABLET, FILM COATED ORAL SEE ADMIN INSTRUCTIONS
Qty: 6 TABLET | Refills: 0 | Status: SHIPPED | OUTPATIENT
Start: 2019-12-18 | End: 2019-12-23

## 2019-12-18 RX ADMIN — PREDNISONE 60 MG: 20 TABLET ORAL at 09:44

## 2019-12-18 RX ADMIN — IPRATROPIUM BROMIDE AND ALBUTEROL SULFATE 1 AMPULE: .5; 3 SOLUTION RESPIRATORY (INHALATION) at 07:02

## 2020-01-13 ENCOUNTER — HOSPITAL ENCOUNTER (EMERGENCY)
Age: 55
Discharge: HOME | End: 2020-01-13
Payer: MEDICARE

## 2020-01-13 VITALS
SYSTOLIC BLOOD PRESSURE: 152 MMHG | TEMPERATURE: 97.6 F | DIASTOLIC BLOOD PRESSURE: 107 MMHG | RESPIRATION RATE: 16 BRPM | OXYGEN SATURATION: 97 % | HEART RATE: 101 BPM

## 2020-01-13 PROCEDURE — 99281 EMR DPT VST MAYX REQ PHY/QHP: CPT

## 2020-01-13 RX ORDER — DULOXETIN HYDROCHLORIDE 30 MG/1
30 CAPSULE, DELAYED RELEASE ORAL DAILY
Qty: 30 CAPSULE | Refills: 0 | Status: SHIPPED | OUTPATIENT
Start: 2020-01-13 | End: 2020-02-18 | Stop reason: SDUPTHER

## 2020-01-13 ASSESSMENT — PAIN DESCRIPTION - PAIN TYPE: TYPE: ACUTE PAIN

## 2020-01-13 ASSESSMENT — PAIN SCALES - GENERAL: PAINLEVEL_OUTOF10: 8

## 2020-01-13 ASSESSMENT — PAIN DESCRIPTION - ORIENTATION: ORIENTATION: LEFT;RIGHT

## 2020-01-13 ASSESSMENT — PAIN DESCRIPTION - LOCATION: LOCATION: LEG

## 2020-01-13 NOTE — ED PROVIDER NOTES
for level 5)     ED Triage Vitals [01/13/20 0802]   BP Temp Temp Source Pulse Resp SpO2 Height Weight   (!) 152/107 97.6 °F (36.4 °C) Oral 101 16 97 % -- --       Physical Exam  Vitals signs and nursing note reviewed. Constitutional:       General: He is not in acute distress. Appearance: He is well-developed. HENT:      Head: Normocephalic and atraumatic. Neck:      Musculoskeletal: Neck supple. Pulmonary:      Effort: Pulmonary effort is normal. No respiratory distress. Musculoskeletal: Normal range of motion. Skin:     General: Skin is warm and dry. Neurological:      Mental Status: He is alert and oriented to person, place, and time. Psychiatric:         Behavior: Behavior normal.            EMERGENCY DEPARTMENT COURSE and DIFFERENTIAL DIAGNOSIS/MDM:   Vitals:    Vitals:    01/13/20 0802   BP: (!) 152/107   Pulse: 101   Resp: 16   Temp: 97.6 °F (36.4 °C)   TempSrc: Oral   SpO2: 97%        I have evaluated this patient. My supervising physician was available for consultation. The patient's Cymbalta will be refilled. I provided the patient with Dr. Severa Isles number and gave him the phone and asked him to make an appointment while sitting here in the emergency department. Discussed results, diagnosis and plan with patient and/or family. Questions addressed. Dispositionand follow-up agreed upon. Specific discharge instructions explained. The patient and/or family and I have discussed the diagnosis and risks, and we agree with discharging home to follow-up with their primary care,specialist or referral doctor. We also discussed returning to the Emergency Department immediately if new or worsening symptoms occur. We have discussed the symptoms which are most concerning that necessitate immediatereturn. PROCEDURES:  None    FINAL IMPRESSION      1.  Encounter for medication refill          DISPOSITION/PLAN   DISPOSITION Decision To Discharge 01/13/2020 08:06:33 AM      PATIENT REFERRED

## 2020-02-17 ENCOUNTER — OFFICE VISIT (OUTPATIENT)
Dept: INTERNAL MEDICINE CLINIC | Age: 55
End: 2020-02-17
Payer: MEDICAID

## 2020-02-17 VITALS
HEIGHT: 60 IN | SYSTOLIC BLOOD PRESSURE: 92 MMHG | RESPIRATION RATE: 12 BRPM | OXYGEN SATURATION: 91 % | WEIGHT: 125.2 LBS | HEART RATE: 98 BPM | BODY MASS INDEX: 24.58 KG/M2 | DIASTOLIC BLOOD PRESSURE: 56 MMHG

## 2020-02-17 LAB
PROSTATE SPECIFIC ANTIGEN: 0.36 NG/ML (ref 0–4)
TSH SERPL DL<=0.05 MIU/L-ACNC: 1.97 UIU/ML (ref 0.27–4.2)

## 2020-02-17 PROCEDURE — G8427 DOCREV CUR MEDS BY ELIG CLIN: HCPCS | Performed by: NURSE PRACTITIONER

## 2020-02-17 PROCEDURE — 3017F COLORECTAL CA SCREEN DOC REV: CPT | Performed by: NURSE PRACTITIONER

## 2020-02-17 PROCEDURE — 1036F TOBACCO NON-USER: CPT | Performed by: NURSE PRACTITIONER

## 2020-02-17 PROCEDURE — 99213 OFFICE O/P EST LOW 20 MIN: CPT | Performed by: NURSE PRACTITIONER

## 2020-02-17 PROCEDURE — G0008 ADMIN INFLUENZA VIRUS VAC: HCPCS | Performed by: NURSE PRACTITIONER

## 2020-02-17 PROCEDURE — 90686 IIV4 VACC NO PRSV 0.5 ML IM: CPT | Performed by: NURSE PRACTITIONER

## 2020-02-17 PROCEDURE — G8482 FLU IMMUNIZE ORDER/ADMIN: HCPCS | Performed by: NURSE PRACTITIONER

## 2020-02-17 PROCEDURE — G8510 SCR DEP NEG, NO PLAN REQD: HCPCS | Performed by: NURSE PRACTITIONER

## 2020-02-17 PROCEDURE — G8926 SPIRO NO PERF OR DOC: HCPCS | Performed by: NURSE PRACTITIONER

## 2020-02-17 PROCEDURE — 3023F SPIROM DOC REV: CPT | Performed by: NURSE PRACTITIONER

## 2020-02-17 PROCEDURE — G8420 CALC BMI NORM PARAMETERS: HCPCS | Performed by: NURSE PRACTITIONER

## 2020-02-17 RX ORDER — TAMSULOSIN HYDROCHLORIDE 0.4 MG/1
0.8 CAPSULE ORAL DAILY
Qty: 60 CAPSULE | Refills: 3 | Status: SHIPPED | OUTPATIENT
Start: 2020-02-17 | End: 2020-02-18

## 2020-02-17 RX ORDER — LISINOPRIL 20 MG/1
TABLET ORAL
Qty: 90 TABLET | Refills: 1 | Status: SHIPPED | OUTPATIENT
Start: 2020-02-17 | End: 2020-04-14 | Stop reason: DRUGHIGH

## 2020-02-17 SDOH — ECONOMIC STABILITY: FOOD INSECURITY: WITHIN THE PAST 12 MONTHS, THE FOOD YOU BOUGHT JUST DIDN'T LAST AND YOU DIDN'T HAVE MONEY TO GET MORE.: NEVER TRUE

## 2020-02-17 SDOH — ECONOMIC STABILITY: FOOD INSECURITY: WITHIN THE PAST 12 MONTHS, YOU WORRIED THAT YOUR FOOD WOULD RUN OUT BEFORE YOU GOT MONEY TO BUY MORE.: NEVER TRUE

## 2020-02-17 SDOH — ECONOMIC STABILITY: TRANSPORTATION INSECURITY
IN THE PAST 12 MONTHS, HAS LACK OF TRANSPORTATION KEPT YOU FROM MEETINGS, WORK, OR FROM GETTING THINGS NEEDED FOR DAILY LIVING?: NO

## 2020-02-17 SDOH — ECONOMIC STABILITY: TRANSPORTATION INSECURITY
IN THE PAST 12 MONTHS, HAS THE LACK OF TRANSPORTATION KEPT YOU FROM MEDICAL APPOINTMENTS OR FROM GETTING MEDICATIONS?: NO

## 2020-02-17 SDOH — ECONOMIC STABILITY: INCOME INSECURITY: HOW HARD IS IT FOR YOU TO PAY FOR THE VERY BASICS LIKE FOOD, HOUSING, MEDICAL CARE, AND HEATING?: SOMEWHAT HARD

## 2020-02-17 ASSESSMENT — ENCOUNTER SYMPTOMS
CONSTIPATION: 0
SHORTNESS OF BREATH: 0
DIARRHEA: 0
ABDOMINAL PAIN: 0
CHEST TIGHTNESS: 0

## 2020-02-17 ASSESSMENT — PATIENT HEALTH QUESTIONNAIRE - PHQ9
SUM OF ALL RESPONSES TO PHQ QUESTIONS 1-9: 1
1. LITTLE INTEREST OR PLEASURE IN DOING THINGS: 0
SUM OF ALL RESPONSES TO PHQ9 QUESTIONS 1 & 2: 1
2. FEELING DOWN, DEPRESSED OR HOPELESS: 1
SUM OF ALL RESPONSES TO PHQ QUESTIONS 1-9: 1

## 2020-02-17 NOTE — PROGRESS NOTES
[address] [facility]  [address]  [address] [facility] [place] [address]  Dept: [phone]       2020    Osman Shelton (:  1965) alexandr 47 y.o. male, here for evaluation of the following medical concerns: This is a new patient being seen today for hypertension and urinary retention. He would like to establish care and receive preventative care services. HPI  Treatment Adherence:   Medication compliance:  compliant most of the time  Diet compliance:  compliant most of the time  Weight trend: stable  Current exercise: no regular exercise  Barriers: none    Hypertension:  Home blood pressure monitoring: No. Patient denies chest pain. Antihypertensive medication side effects: no medication side effects noted. Use of agents associated with hypertension: none. Sodium (mmol/L)   Date Value   2019 140    BUN (mg/dL)   Date Value   2019 11    Glucose (mg/dL)   Date Value   2019 111 (H)      Potassium reflex Magnesium (mmol/L)   Date Value   2019 3.9    CREATININE (mg/dL)   Date Value   2019 <0.5 (L)         Hyperlipidemia:  No new myalgias or GI upset on atorvastatin (Lipitor).      Lab Results   Component Value Date    CHOL 163 2019    TRIG 75 2019    HDL 75 (H) 2019    LDLCALC 73 2019     Lab Results   Component Value Date    ALT 11 2019    AST 20 2019        Lab Results   Component Value Date    CHOL 163 2019    CHOL 214 (H) 2017    CHOL 253 (H) 2014     Lab Results   Component Value Date    TRIG 75 2019    TRIG 102 2017    TRIG 67 2014     Lab Results   Component Value Date    HDL 75 (H) 2019     (H) 2017     (H) 2014     Lab Results   Component Value Date    LDLCALC 73 2019    LDLCALC 76 2017    LDLCALC 84 2014     Lab Results   Component Value Date    LABVLDL 15 2019    LABVLDL 20 02/21/2017    LABVLDL 13 12/23/2014     No results found for: [facility]  Lab Results   Component Value Date    WBC 7.3 12/18/2019    HGB 12.0 (L) 12/18/2019    HCT 37.3 (L) 12/18/2019    MCV 88.4 12/18/2019     12/18/2019     Lab Results   Component Value Date     12/18/2019    K 3.9 12/18/2019    CL 96 (L) 12/18/2019    CO2 31 12/18/2019    BUN 11 12/18/2019    CREATININE <0.5 (L) 12/18/2019    GLUCOSE 111 (H) 12/18/2019    CALCIUM 9.6 12/18/2019    PROT 7.0 12/18/2019    LABALBU 3.7 12/18/2019    BILITOT <0.2 12/18/2019    ALKPHOS 63 12/18/2019    AST 20 12/18/2019    ALT 11 12/18/2019    LABGLOM >60 12/18/2019    GFRAA >60 12/18/2019    AGRATIO 1.1 12/18/2019    GLOB 3.3 12/18/2019       Review of Systems   Constitutional: Negative for activity change and fever. HENT: Negative for congestion. Eyes: Negative for visual disturbance. Respiratory: Negative for chest tightness and shortness of breath. Cardiovascular: Negative for chest pain, palpitations and leg swelling. Gastrointestinal: Negative for abdominal pain, constipation and diarrhea. Endocrine: Negative for polyuria. Genitourinary: Positive for frequency. Negative for dysuria. Musculoskeletal: Negative for arthralgias and myalgias. Skin: Negative for rash. Neurological: Negative for dizziness, light-headedness and headaches. Psychiatric/Behavioral: Negative for agitation, decreased concentration and sleep disturbance. The patient is not nervous/anxious. Prior to Visit Medications    Medication Sig Taking?  Authorizing Provider   tamsulosin (FLOMAX) 0.4 MG capsule Take 2 capsules by mouth daily Yes Elvi Laurie, APRN - CNP   lisinopril (PRINIVIL;ZESTRIL) 20 MG tablet TAKE 1 TABLET BY MOUTH DAILY Yes Elvi Laurie, APRN - CNP   DULoxetine (CYMBALTA) 30 MG extended release capsule Take 1 capsule by mouth daily Yes Josiah Cooper PA-C   aspirin 81 MG chewable tablet Take 1 tablet by mouth daily Yes Bird Lopez Transportation needs:     Medical: No     Non-medical: No   Tobacco Use    Smoking status: Never Smoker    Smokeless tobacco: Never Used   Substance and Sexual Activity    Alcohol use: No    Drug use: Yes     Types: Marijuana    Sexual activity: Not on file   Lifestyle    Physical activity:     Days per week: Not on file     Minutes per session: Not on file    Stress: Not on file   Relationships    Social connections:     Talks on phone: Not on file     Gets together: Not on file     Attends Confucianism service: Not on file     Active member of club or organization: Not on file     Attends meetings of clubs or organizations: Not on file     Relationship status: Not on file    Intimate partner violence:     Fear of current or ex partner: Not on file     Emotionally abused: Not on file     Physically abused: Not on file     Forced sexual activity: Not on file   Other Topics Concern    Not on file   Social History Narrative    Lives with mom        Family History   Adopted: Yes   Problem Relation Age of Onset    Asthma Mother     Alcohol Abuse Mother     Heart Disease Father         MI    Asthma Maternal Aunt     High Blood Pressure Maternal Aunt     Diabetes Maternal Uncle     High Blood Pressure Maternal Uncle     Stroke Maternal Grandmother     Cancer Maternal Grandmother         Colon       Vitals:    02/17/20 1657 02/17/20 1702 02/17/20 1703   BP: (!) 97/54 (!) 80/52 (!) 92/56   Site: Left Upper Arm  Right Upper Arm   Position: Sitting  Sitting   Cuff Size: Medium Adult  Medium Adult   Pulse: 98     Resp: 12     SpO2: 91%     Weight: 125 lb 3.2 oz (56.8 kg)     Height: 5' (1.524 m)       Estimated body mass index is 24.45 kg/m² as calculated from the following:    Height as of this encounter: 5' (1.524 m). Weight as of this encounter: 125 lb 3.2 oz (56.8 kg). Physical Exam  Vitals signs reviewed. Constitutional:       Appearance: He is well-developed. He is not diaphoretic.    HENT: Head: Normocephalic. Right Ear: Hearing and external ear normal. No tenderness. Left Ear: Hearing and external ear normal. No tenderness. Nose: Nose normal.   Eyes:      General: Lids are normal.   Cardiovascular:      Rate and Rhythm: Normal rate and regular rhythm. Heart sounds: Normal heart sounds, S1 normal and S2 normal.   Pulmonary:      Effort: Pulmonary effort is normal.      Breath sounds: Normal breath sounds. Musculoskeletal: Normal range of motion. Lymphadenopathy:      Head:      Right side of head: No submental or submandibular adenopathy. Left side of head: No submental or submandibular adenopathy. Cervical:      Right cervical: No superficial cervical adenopathy. Left cervical: No superficial cervical adenopathy. Skin:     General: Skin is warm and dry. Findings: No rash. Nails: There is no clubbing. Neurological:      Mental Status: He is alert and oriented to person, place, and time. GCS: GCS eye subscore is 4. GCS verbal subscore is 5. GCS motor subscore is 6. Psychiatric:         Speech: Speech normal.         Behavior: Behavior normal. Behavior is cooperative. Judgment: Judgment normal.         ASSESSMENT/PLAN:  1. Essential hypertension  -controlled  -Decrease Lisinopril dose to 20 mg daily  - lisinopril (PRINIVIL;ZESTRIL) 20 MG tablet; TAKE 1 TABLET BY MOUTH DAILY  Dispense: 90 tablet; Refill: 1    2. COPD, moderate ([address]  -Continue current medications. 3. Urinary retention  -Increase Flomax to 0.8 mg daily  - tamsulosin (FLOMAX) 0.4 MG capsule; Take 2 capsules by mouth daily  Dispense: 60 capsule; Refill: 3    4. Elevated glucose level    - Hemoglobin A1C    5. Screening for prostate cancer    - Psa screening    6.  Screening for thyroid disorder    - TSH without Reflex      Return in about 3 months (around 5/17/2020) for HTN.      --DELISA Lebron - CNP on 2/17/2020 at 5:46 PM

## 2020-02-18 LAB
ESTIMATED AVERAGE GLUCOSE: 159.9 MG/DL
HBA1C MFR BLD: 7.2 %

## 2020-02-18 RX ORDER — TAMSULOSIN HYDROCHLORIDE 0.4 MG/1
0.8 CAPSULE ORAL DAILY
Qty: 180 CAPSULE | Refills: 1 | Status: SHIPPED | OUTPATIENT
Start: 2020-02-18 | End: 2020-06-15

## 2020-02-18 RX ORDER — DULOXETIN HYDROCHLORIDE 30 MG/1
30 CAPSULE, DELAYED RELEASE ORAL DAILY
Qty: 30 CAPSULE | Refills: 3 | Status: SHIPPED | OUTPATIENT
Start: 2020-02-18 | End: 2020-02-27 | Stop reason: SDUPTHER

## 2020-02-28 RX ORDER — DULOXETIN HYDROCHLORIDE 30 MG/1
30 CAPSULE, DELAYED RELEASE ORAL 3 TIMES DAILY
Qty: 270 CAPSULE | Refills: 1 | Status: SHIPPED | OUTPATIENT
Start: 2020-02-28 | End: 2020-09-16 | Stop reason: SDUPTHER

## 2020-04-13 ENCOUNTER — TELEPHONE (OUTPATIENT)
Dept: INTERNAL MEDICINE CLINIC | Age: 55
End: 2020-04-13

## 2020-04-14 RX ORDER — LISINOPRIL 20 MG/1
TABLET ORAL
Qty: 90 TABLET | Refills: 1 | Status: SHIPPED | OUTPATIENT
Start: 2020-04-14 | End: 2020-07-07

## 2020-07-09 RX ORDER — LISINOPRIL 20 MG/1
TABLET ORAL
Qty: 30 TABLET | Refills: 3 | Status: SHIPPED | OUTPATIENT
Start: 2020-07-09 | End: 2020-11-09

## 2020-08-03 RX ORDER — PANTOPRAZOLE SODIUM 40 MG/1
TABLET, DELAYED RELEASE ORAL
Qty: 90 TABLET | Refills: 1 | Status: SHIPPED | OUTPATIENT
Start: 2020-08-03 | End: 2021-02-09 | Stop reason: SDUPTHER

## 2020-09-16 ENCOUNTER — VIRTUAL VISIT (OUTPATIENT)
Dept: INTERNAL MEDICINE CLINIC | Age: 55
End: 2020-09-16
Payer: MEDICARE

## 2020-09-16 ENCOUNTER — TELEPHONE (OUTPATIENT)
Dept: INTERNAL MEDICINE CLINIC | Age: 55
End: 2020-09-16

## 2020-09-16 PROCEDURE — 3017F COLORECTAL CA SCREEN DOC REV: CPT | Performed by: NURSE PRACTITIONER

## 2020-09-16 PROCEDURE — 2022F DILAT RTA XM EVC RTNOPTHY: CPT | Performed by: NURSE PRACTITIONER

## 2020-09-16 PROCEDURE — 1036F TOBACCO NON-USER: CPT | Performed by: NURSE PRACTITIONER

## 2020-09-16 PROCEDURE — G8420 CALC BMI NORM PARAMETERS: HCPCS | Performed by: NURSE PRACTITIONER

## 2020-09-16 PROCEDURE — 3051F HG A1C>EQUAL 7.0%<8.0%: CPT | Performed by: NURSE PRACTITIONER

## 2020-09-16 PROCEDURE — 99214 OFFICE O/P EST MOD 30 MIN: CPT | Performed by: NURSE PRACTITIONER

## 2020-09-16 PROCEDURE — G8427 DOCREV CUR MEDS BY ELIG CLIN: HCPCS | Performed by: NURSE PRACTITIONER

## 2020-09-16 RX ORDER — DULOXETIN HYDROCHLORIDE 30 MG/1
30 CAPSULE, DELAYED RELEASE ORAL 3 TIMES DAILY
Qty: 270 CAPSULE | Refills: 1 | Status: SHIPPED | OUTPATIENT
Start: 2020-09-16 | End: 2021-03-16 | Stop reason: SDUPTHER

## 2020-09-16 RX ORDER — DOXAZOSIN MESYLATE 1 MG/1
1 TABLET ORAL DAILY
Qty: 30 TABLET | Refills: 3 | Status: SHIPPED | OUTPATIENT
Start: 2020-09-16 | End: 2021-03-16 | Stop reason: SDUPTHER

## 2020-09-16 NOTE — PROGRESS NOTES
asked about current treatments, none were reported. When asked about meal planning, he reported none. He has not had a previous visit with a dietitian. He rarely participates in exercise. An ACE inhibitor/angiotensin II receptor blocker is being taken. Patient had A1c 7.2% in February, not prescribed medication at that time. Discussed today in detail his diagnosis of diabetes and plan for initial treatment. Nocturia  He reports frequent urination, 4-5 times during the night. He reports frequent urination during the day, varies in amount. Reports unable to urinate at times and just has pressure. He is presently taking medication for BPH, states it is not effective. Advised patient that nocturia may be related to diagnosis of diabetes and will start medication as well as provided additional medication for treatment of BPH. Treatment Adherence:   Medication compliance:  compliant most of the time. He admits to not taking Lipitor or ASA. Diet compliance:  compliant most of the time  Weight trend: stable  Current exercise: no regular exercise  Barriers: none     Hypertension:  Home blood pressure monitoring: No. Patient denies chest pain. Antihypertensive medication side effects: no medication side effects noted. Use of agents associated with hypertension: none. Hyperlipidemia  Has been prescribed cholesterol lowering medication in the past. Unsure of when he discontinued medication. Discussed the importance of lowering cholesterol level. Narcotic Abuse  He reports being prescribed Suboxone for pain control, 8mg is prescribed, states he takes 1/3 of tablet. Patient has a history of Narcotic abuse.      Lab Results   Component Value Date    CHOL 163 08/26/2019    CHOL 214 (H) 02/21/2017    CHOL 253 (H) 12/23/2014     Lab Results   Component Value Date    TRIG 75 08/26/2019    TRIG 102 02/21/2017    TRIG 67 12/23/2014     Lab Results   Component Value Date    HDL 75 (H) 08/26/2019     (H) 02/21/2017     (H) 12/23/2014     Lab Results   Component Value Date    LDLCALC 73 08/26/2019    LDLCALC 76 02/21/2017    LDLCALC 84 12/23/2014     Lab Results   Component Value Date    LABVLDL 15 08/26/2019    LABVLDL 20 02/21/2017    LABVLDL 13 12/23/2014     Lab Results   Component Value Date    WBC 7.3 12/18/2019    HGB 12.0 (L) 12/18/2019    HCT 37.3 (L) 12/18/2019    MCV 88.4 12/18/2019     12/18/2019     Lab Results   Component Value Date     12/18/2019    K 3.9 12/18/2019    CL 96 (L) 12/18/2019    CO2 31 12/18/2019    BUN 11 12/18/2019    CREATININE <0.5 (L) 12/18/2019    GLUCOSE 111 (H) 12/18/2019    CALCIUM 9.6 12/18/2019    PROT 7.0 12/18/2019    LABALBU 3.7 12/18/2019    BILITOT <0.2 12/18/2019    ALKPHOS 63 12/18/2019    AST 20 12/18/2019    ALT 11 12/18/2019    LABGLOM >60 12/18/2019    GFRAA >60 12/18/2019    AGRATIO 1.1 12/18/2019    GLOB 3.3 12/18/2019       Review of Systems   Constitutional: Negative for activity change and fever. HENT: Negative for congestion. Eyes: Negative for blurred vision and visual disturbance. Respiratory: Negative for chest tightness and shortness of breath. Cardiovascular: Negative for chest pain, palpitations and leg swelling. Gastrointestinal: Positive for heartburn. Negative for abdominal pain, constipation and diarrhea. Endocrine: Positive for polyuria. Negative for polydipsia. Genitourinary: Positive for difficulty urinating. Negative for dysuria, hematuria, scrotal swelling, testicular pain and urgency. Musculoskeletal: Negative for arthralgias, myalgias and muscle weakness. Skin: Negative for rash. Neurological: Negative for dizziness, tremors, seizures, light-headedness and headaches. Psychiatric/Behavioral: Negative for agitation, confusion, decreased concentration and sleep disturbance. The patient is not nervous/anxious. Prior to Visit Medications    Medication Sig Taking?  Authorizing Provider   DULoxetine (CYMBALTA) 30 MG extended release capsule Take 1 capsule by mouth 3 times daily  Patient taking differently: Take 90 mg by mouth daily  Yes Corita Counts, DELISA - CNP   doxazosin (CARDURA) 1 MG tablet Take 1 tablet by mouth daily Yes Corita Counts, APRN - CNP   metFORMIN (GLUCOPHAGE) 500 MG tablet Take 1 tablet by mouth daily (with breakfast) Yes Corita Counts, APRN - CNP   pantoprazole (PROTONIX) 40 MG tablet TAKE 1 TABLET BY MOUTH EVERY DAY  Corita Counts, APRN - CNP   lisinopril (PRINIVIL;ZESTRIL) 20 MG tablet TAKE 1 TABLET BY MOUTH DAILY  Corita Counts, APRN - CNP   tamsulosin (FLOMAX) 0.4 MG capsule TAKE 2 CAPSULES BY MOUTH DAILY  Corita Counts, DELISA - CNP   aspirin 81 MG chewable tablet Take 1 tablet by mouth daily  DELISA Arredondo NP   atorvastatin (LIPITOR) 40 MG tablet Take 1 tablet by mouth nightly  Patient not taking: Reported on 2/17/2020  DELISA Arredondo NP   vitamin C (ASCORBIC ACID) 500 MG tablet Take 500 mg by mouth daily  Historical Provider, MD   vitamin D3 (CHOLECALCIFEROL) 400 units TABS tablet Take 400 Units by mouth daily  Historical Provider, MD   albuterol sulfate HFA (PROAIR HFA) 108 (90 BASE) MCG/ACT inhaler INHALE 2 PUFFS INTO THE MOUTH EVERY 4 HOURS AS NEEDED FOR WHEEZING OR SHORTNESS OF BREATH  Yan Hunt,    albuterol (PROVENTIL) (2.5 MG/3ML) 0.083% nebulizer solution Take 3 mLs by nebulization every 6 hours as needed for Wheezing. Patient not taking: Reported on 9/16/2020  Alexis Brown,    Multiple Vitamins-Minerals (MULTIVITAMIN & MINERAL PO) Take 1 tablet by mouth daily   Historical Provider, MD   vitamin B-12 (CYANOCOBALAMIN) 1000 MCG tablet Take 1,000 mcg by mouth daily. Historical Provider, MD        Social History     Tobacco Use    Smoking status: Never Smoker    Smokeless tobacco: Never Used   Substance Use Topics    Alcohol use: No        There were no vitals filed for this visit.   Estimated body mass index is 24.45 DELISA Silva - CNP on 9/17/2020 at 9:13 AM    An electronic signature was used to authenticate this note.

## 2020-09-16 NOTE — TELEPHONE ENCOUNTER
----- Message from Lucio Johansen sent at 9/16/2020  7:29 AM EDT -----  Subject: Message to Provider    QUESTIONS  Information for Provider? Pt is requesting refill on his DULoxetine   (CYMBALTA) 30 MG extended release capsule [. please advise  ---------------------------------------------------------------------------  --------------  CALL BACK INFO  What is the best way for the office to contact you? OK to leave message on   voicemail  Preferred Call Back Phone Number? [phone]  ---------------------------------------------------------------------------  --------------  SCRIPT ANSWERS  Relationship to Patient?  Self

## 2020-09-16 NOTE — PATIENT INSTRUCTIONS
Have fasting labs drawn within 2 weeks  Make appointment with Dr. Selene Neal in 4 weeks if symptoms do not resolve  Start taking Cardura every night  Metformin 500 mg with Breakfast

## 2020-09-16 NOTE — TELEPHONE ENCOUNTER
----- Message from Job Shi sent at 9/16/2020  7:29 AM EDT -----  Subject: Message to Provider    QUESTIONS  Information for Provider? Pt is requesting refill on his DULoxetine   (CYMBALTA) 30 MG extended release capsule [. please advise  ---------------------------------------------------------------------------  --------------  CALL BACK INFO  What is the best way for the office to contact you? OK to leave message on   voicemail  Preferred Call Back Phone Number? [phone]  ---------------------------------------------------------------------------  --------------  SCRIPT ANSWERS  Relationship to Patient?  Self

## 2020-09-17 ASSESSMENT — ENCOUNTER SYMPTOMS
SHORTNESS OF BREATH: 0
BELCHING: 1
CHEST TIGHTNESS: 0
CONSTIPATION: 0
ABDOMINAL PAIN: 0
HEARTBURN: 1
DIARRHEA: 0
BLURRED VISION: 0

## 2020-09-17 ASSESSMENT — PATIENT HEALTH QUESTIONNAIRE - PHQ9
2. FEELING DOWN, DEPRESSED OR HOPELESS: 0
SUM OF ALL RESPONSES TO PHQ9 QUESTIONS 1 & 2: 0
SUM OF ALL RESPONSES TO PHQ QUESTIONS 1-9: 0
SUM OF ALL RESPONSES TO PHQ QUESTIONS 1-9: 0
1. LITTLE INTEREST OR PLEASURE IN DOING THINGS: 0

## 2020-11-10 RX ORDER — LISINOPRIL 20 MG/1
TABLET ORAL
Qty: 90 TABLET | Refills: 1 | Status: ON HOLD | OUTPATIENT
Start: 2020-11-10 | End: 2021-04-26 | Stop reason: HOSPADM

## 2021-02-08 DIAGNOSIS — K21.9 GERD WITHOUT ESOPHAGITIS: ICD-10-CM

## 2021-02-09 RX ORDER — PANTOPRAZOLE SODIUM 40 MG/1
TABLET, DELAYED RELEASE ORAL
Qty: 90 TABLET | Refills: 1 | Status: ON HOLD | OUTPATIENT
Start: 2021-02-09 | End: 2021-04-26 | Stop reason: HOSPADM

## 2021-03-12 DIAGNOSIS — R33.9 URINARY RETENTION: ICD-10-CM

## 2021-03-13 RX ORDER — TAMSULOSIN HYDROCHLORIDE 0.4 MG/1
0.8 CAPSULE ORAL DAILY
Qty: 60 CAPSULE | Refills: 3 | Status: ON HOLD | OUTPATIENT
Start: 2021-03-13 | End: 2021-04-26 | Stop reason: HOSPADM

## 2021-03-16 DIAGNOSIS — F41.9 ANXIETY: ICD-10-CM

## 2021-03-16 DIAGNOSIS — N40.1 BENIGN PROSTATIC HYPERPLASIA WITH WEAK URINARY STREAM: ICD-10-CM

## 2021-03-16 DIAGNOSIS — R39.12 BENIGN PROSTATIC HYPERPLASIA WITH WEAK URINARY STREAM: ICD-10-CM

## 2021-03-16 RX ORDER — DULOXETIN HYDROCHLORIDE 30 MG/1
30 CAPSULE, DELAYED RELEASE ORAL 3 TIMES DAILY
Qty: 270 CAPSULE | Refills: 1 | Status: SHIPPED | OUTPATIENT
Start: 2021-03-16 | End: 2021-04-18

## 2021-03-16 RX ORDER — DOXAZOSIN MESYLATE 1 MG/1
1 TABLET ORAL DAILY
Qty: 30 TABLET | Refills: 3 | Status: ON HOLD | OUTPATIENT
Start: 2021-03-16 | End: 2021-04-26 | Stop reason: HOSPADM

## 2021-04-17 ENCOUNTER — APPOINTMENT (OUTPATIENT)
Dept: GENERAL RADIOLOGY | Age: 56
DRG: 207 | End: 2021-04-17
Payer: MEDICARE

## 2021-04-17 ENCOUNTER — HOSPITAL ENCOUNTER (INPATIENT)
Age: 56
LOS: 9 days | Discharge: HOSPICE | DRG: 207 | End: 2021-04-26
Attending: INTERNAL MEDICINE | Admitting: INTERNAL MEDICINE
Payer: MEDICARE

## 2021-04-17 ENCOUNTER — APPOINTMENT (OUTPATIENT)
Dept: CT IMAGING | Age: 56
DRG: 207 | End: 2021-04-17
Payer: MEDICARE

## 2021-04-17 DIAGNOSIS — R06.00 DYSPNEA AND RESPIRATORY ABNORMALITIES: ICD-10-CM

## 2021-04-17 DIAGNOSIS — R06.89 DYSPNEA AND RESPIRATORY ABNORMALITIES: ICD-10-CM

## 2021-04-17 DIAGNOSIS — R77.8 ELEVATED TROPONIN: ICD-10-CM

## 2021-04-17 DIAGNOSIS — R09.02 HYPOXIA: Primary | ICD-10-CM

## 2021-04-17 PROBLEM — J96.01 ACUTE RESPIRATORY FAILURE WITH HYPOXIA (HCC): Status: ACTIVE | Noted: 2021-04-17

## 2021-04-17 LAB
A/G RATIO: 1.1 (ref 1.1–2.2)
ALBUMIN SERPL-MCNC: 3.5 G/DL (ref 3.4–5)
ALP BLD-CCNC: 66 U/L (ref 40–129)
ALT SERPL-CCNC: 23 U/L (ref 10–40)
ANION GAP SERPL CALCULATED.3IONS-SCNC: 9 MMOL/L (ref 3–16)
AST SERPL-CCNC: 18 U/L (ref 15–37)
BASE EXCESS ARTERIAL: 14.3 MMOL/L (ref -3–3)
BASE EXCESS VENOUS: 10.4 MMOL/L
BASOPHILS ABSOLUTE: 0.1 K/UL (ref 0–0.2)
BASOPHILS RELATIVE PERCENT: 0.5 %
BILIRUB SERPL-MCNC: <0.2 MG/DL (ref 0–1)
BILIRUBIN URINE: ABNORMAL
BLOOD, URINE: ABNORMAL
BUN BLDV-MCNC: 17 MG/DL (ref 7–20)
CALCIUM SERPL-MCNC: 8.5 MG/DL (ref 8.3–10.6)
CARBOXYHEMOGLOBIN ARTERIAL: 1.5 % (ref 0–1.5)
CARBOXYHEMOGLOBIN: 1.8 %
CHLORIDE BLD-SCNC: 95 MMOL/L (ref 99–110)
CLARITY: CLEAR
CO2: 34 MMOL/L (ref 21–32)
COLOR: YELLOW
CREAT SERPL-MCNC: <0.5 MG/DL (ref 0.9–1.3)
D DIMER: 460 NG/ML DDU (ref 0–229)
EOSINOPHILS ABSOLUTE: 0 K/UL (ref 0–0.6)
EOSINOPHILS RELATIVE PERCENT: 0.1 %
EPITHELIAL CELLS, UA: 1 /HPF (ref 0–5)
GFR AFRICAN AMERICAN: >60
GFR NON-AFRICAN AMERICAN: >60
GLOBULIN: 3.1 G/DL
GLUCOSE BLD-MCNC: 129 MG/DL (ref 70–99)
GLUCOSE URINE: NEGATIVE MG/DL
HCO3 ARTERIAL: 44.9 MMOL/L (ref 21–29)
HCO3 VENOUS: 41 MMOL/L (ref 23–29)
HCT VFR BLD CALC: 36 % (ref 40.5–52.5)
HEMOGLOBIN, ART, EXTENDED: 11.3 G/DL (ref 13.5–17.5)
HEMOGLOBIN: 11.1 G/DL (ref 13.5–17.5)
HYALINE CASTS: 9 /LPF (ref 0–8)
KETONES, URINE: NEGATIVE MG/DL
LACTIC ACID, SEPSIS: 1.3 MMOL/L (ref 0.4–1.9)
LACTIC ACID, SEPSIS: 1.7 MMOL/L (ref 0.4–1.9)
LEUKOCYTE ESTERASE, URINE: NEGATIVE
LIPASE: 16 U/L (ref 13–60)
LYMPHOCYTES ABSOLUTE: 0.6 K/UL (ref 1–5.1)
LYMPHOCYTES RELATIVE PERCENT: 5.4 %
MCH RBC QN AUTO: 26.1 PG (ref 26–34)
MCHC RBC AUTO-ENTMCNC: 30.9 G/DL (ref 31–36)
MCV RBC AUTO: 84.3 FL (ref 80–100)
METHEMOGLOBIN ARTERIAL: 0.7 %
METHEMOGLOBIN VENOUS: 0.5 %
MICROSCOPIC EXAMINATION: YES
MONOCYTES ABSOLUTE: 0.2 K/UL (ref 0–1.3)
MONOCYTES RELATIVE PERCENT: 2.1 %
NEUTROPHILS ABSOLUTE: 9.9 K/UL (ref 1.7–7.7)
NEUTROPHILS RELATIVE PERCENT: 91.9 %
NITRITE, URINE: NEGATIVE
O2 CONTENT ARTERIAL: 15 ML/DL
O2 CONTENT, VEN: 14 ML/DL
O2 SAT, ARTERIAL: 95.3 %
O2 SAT, VEN: 92 %
O2 THERAPY: ABNORMAL
O2 THERAPY: ABNORMAL
PCO2 ARTERIAL: 98.3 MMHG (ref 35–45)
PCO2, VEN: 99.4 MMHG (ref 40–50)
PDW BLD-RTO: 16 % (ref 12.4–15.4)
PH ARTERIAL: 7.27 (ref 7.35–7.45)
PH UA: 5.5 (ref 5–8)
PH VENOUS: 7.23 (ref 7.35–7.45)
PLATELET # BLD: 335 K/UL (ref 135–450)
PLATELET SLIDE REVIEW: ADEQUATE
PMV BLD AUTO: 8.1 FL (ref 5–10.5)
PO2 ARTERIAL: 85.3 MMHG (ref 75–108)
PO2, VEN: 75 MMHG
POTASSIUM REFLEX MAGNESIUM: 4.4 MMOL/L (ref 3.5–5.1)
PRO-BNP: 4589 PG/ML (ref 0–124)
PROCALCITONIN: 0.04 NG/ML (ref 0–0.15)
PROTEIN UA: 100 MG/DL
RBC # BLD: 4.27 M/UL (ref 4.2–5.9)
RBC UA: 7 /HPF (ref 0–4)
SARS-COV-2, NAAT: NOT DETECTED
SLIDE REVIEW: ABNORMAL
SODIUM BLD-SCNC: 138 MMOL/L (ref 136–145)
SPECIFIC GRAVITY UA: >1.03 (ref 1–1.03)
TCO2 ARTERIAL: 47.9 MMOL/L
TCO2 CALC VENOUS: 44 MMOL/L
TOTAL CK: 41 U/L (ref 39–308)
TOTAL PROTEIN: 6.6 G/DL (ref 6.4–8.2)
TROPONIN: 0.04 NG/ML
TROPONIN: 0.05 NG/ML
URINE REFLEX TO CULTURE: ABNORMAL
URINE TYPE: ABNORMAL
UROBILINOGEN, URINE: 1 E.U./DL
WBC # BLD: 10.8 K/UL (ref 4–11)
WBC UA: 1 /HPF (ref 0–5)

## 2021-04-17 PROCEDURE — 82803 BLOOD GASES ANY COMBINATION: CPT

## 2021-04-17 PROCEDURE — 99283 EMERGENCY DEPT VISIT LOW MDM: CPT

## 2021-04-17 PROCEDURE — 6360000002 HC RX W HCPCS: Performed by: NURSE PRACTITIONER

## 2021-04-17 PROCEDURE — 0BH17EZ INSERTION OF ENDOTRACHEAL AIRWAY INTO TRACHEA, VIA NATURAL OR ARTIFICIAL OPENING: ICD-10-PCS | Performed by: INTERNAL MEDICINE

## 2021-04-17 PROCEDURE — 71260 CT THORAX DX C+: CPT

## 2021-04-17 PROCEDURE — 5A1955Z RESPIRATORY VENTILATION, GREATER THAN 96 CONSECUTIVE HOURS: ICD-10-PCS | Performed by: INTERNAL MEDICINE

## 2021-04-17 PROCEDURE — 83880 ASSAY OF NATRIURETIC PEPTIDE: CPT

## 2021-04-17 PROCEDURE — 2580000003 HC RX 258: Performed by: INTERNAL MEDICINE

## 2021-04-17 PROCEDURE — 6370000000 HC RX 637 (ALT 250 FOR IP): Performed by: NURSE PRACTITIONER

## 2021-04-17 PROCEDURE — 6360000004 HC RX CONTRAST MEDICATION: Performed by: NURSE PRACTITIONER

## 2021-04-17 PROCEDURE — 80053 COMPREHEN METABOLIC PANEL: CPT

## 2021-04-17 PROCEDURE — 93005 ELECTROCARDIOGRAM TRACING: CPT | Performed by: NURSE PRACTITIONER

## 2021-04-17 PROCEDURE — 84484 ASSAY OF TROPONIN QUANT: CPT

## 2021-04-17 PROCEDURE — XW033N5 INTRODUCTION OF MEROPENEM-VABORBACTAM ANTI-INFECTIVE INTO PERIPHERAL VEIN, PERCUTANEOUS APPROACH, NEW TECHNOLOGY GROUP 5: ICD-10-PCS | Performed by: INTERNAL MEDICINE

## 2021-04-17 PROCEDURE — 94660 CPAP INITIATION&MGMT: CPT

## 2021-04-17 PROCEDURE — 71045 X-RAY EXAM CHEST 1 VIEW: CPT

## 2021-04-17 PROCEDURE — 36600 WITHDRAWAL OF ARTERIAL BLOOD: CPT

## 2021-04-17 PROCEDURE — 82550 ASSAY OF CK (CPK): CPT

## 2021-04-17 PROCEDURE — 94761 N-INVAS EAR/PLS OXIMETRY MLT: CPT

## 2021-04-17 PROCEDURE — 81001 URINALYSIS AUTO W/SCOPE: CPT

## 2021-04-17 PROCEDURE — 85025 COMPLETE CBC W/AUTO DIFF WBC: CPT

## 2021-04-17 PROCEDURE — 6370000000 HC RX 637 (ALT 250 FOR IP): Performed by: INTERNAL MEDICINE

## 2021-04-17 PROCEDURE — 83690 ASSAY OF LIPASE: CPT

## 2021-04-17 PROCEDURE — 85379 FIBRIN DEGRADATION QUANT: CPT

## 2021-04-17 PROCEDURE — 2700000000 HC OXYGEN THERAPY PER DAY

## 2021-04-17 PROCEDURE — 87040 BLOOD CULTURE FOR BACTERIA: CPT

## 2021-04-17 PROCEDURE — 84145 PROCALCITONIN (PCT): CPT

## 2021-04-17 PROCEDURE — 36415 COLL VENOUS BLD VENIPUNCTURE: CPT

## 2021-04-17 PROCEDURE — 87635 SARS-COV-2 COVID-19 AMP PRB: CPT

## 2021-04-17 PROCEDURE — 83605 ASSAY OF LACTIC ACID: CPT

## 2021-04-17 PROCEDURE — 2060000000 HC ICU INTERMEDIATE R&B

## 2021-04-17 RX ORDER — LISINOPRIL 20 MG/1
20 TABLET ORAL DAILY
Status: DISCONTINUED | OUTPATIENT
Start: 2021-04-17 | End: 2021-04-18

## 2021-04-17 RX ORDER — FUROSEMIDE 10 MG/ML
20 INJECTION INTRAMUSCULAR; INTRAVENOUS ONCE
Status: COMPLETED | OUTPATIENT
Start: 2021-04-17 | End: 2021-04-17

## 2021-04-17 RX ORDER — ALBUTEROL SULFATE 2.5 MG/3ML
2.5 SOLUTION RESPIRATORY (INHALATION) EVERY 6 HOURS PRN
Status: DISCONTINUED | OUTPATIENT
Start: 2021-04-17 | End: 2021-04-17

## 2021-04-17 RX ORDER — TAMSULOSIN HYDROCHLORIDE 0.4 MG/1
0.8 CAPSULE ORAL DAILY
Status: DISCONTINUED | OUTPATIENT
Start: 2021-04-17 | End: 2021-04-18

## 2021-04-17 RX ORDER — SODIUM CHLORIDE 0.9 % (FLUSH) 0.9 %
10 SYRINGE (ML) INJECTION EVERY 12 HOURS SCHEDULED
Status: DISCONTINUED | OUTPATIENT
Start: 2021-04-17 | End: 2021-04-26 | Stop reason: HOSPADM

## 2021-04-17 RX ORDER — SODIUM CHLORIDE 0.9 % (FLUSH) 0.9 %
10 SYRINGE (ML) INJECTION PRN
Status: DISCONTINUED | OUTPATIENT
Start: 2021-04-17 | End: 2021-04-26 | Stop reason: HOSPADM

## 2021-04-17 RX ORDER — POLYETHYLENE GLYCOL 3350 17 G/17G
17 POWDER, FOR SOLUTION ORAL DAILY PRN
Status: DISCONTINUED | OUTPATIENT
Start: 2021-04-17 | End: 2021-04-26 | Stop reason: HOSPADM

## 2021-04-17 RX ORDER — DOXAZOSIN 2 MG/1
1 TABLET ORAL DAILY
Status: DISCONTINUED | OUTPATIENT
Start: 2021-04-17 | End: 2021-04-18 | Stop reason: ALTCHOICE

## 2021-04-17 RX ORDER — ONDANSETRON 2 MG/ML
4 INJECTION INTRAMUSCULAR; INTRAVENOUS EVERY 4 HOURS PRN
Status: DISCONTINUED | OUTPATIENT
Start: 2021-04-17 | End: 2021-04-26 | Stop reason: HOSPADM

## 2021-04-17 RX ORDER — ALBUTEROL SULFATE 90 UG/1
2 AEROSOL, METERED RESPIRATORY (INHALATION) EVERY 6 HOURS PRN
Status: DISCONTINUED | OUTPATIENT
Start: 2021-04-17 | End: 2021-04-26 | Stop reason: HOSPADM

## 2021-04-17 RX ORDER — ASPIRIN 81 MG/1
324 TABLET, CHEWABLE ORAL ONCE
Status: COMPLETED | OUTPATIENT
Start: 2021-04-17 | End: 2021-04-17

## 2021-04-17 RX ORDER — DULOXETIN HYDROCHLORIDE 30 MG/1
30 CAPSULE, DELAYED RELEASE ORAL 3 TIMES DAILY
Status: DISCONTINUED | OUTPATIENT
Start: 2021-04-17 | End: 2021-04-19

## 2021-04-17 RX ORDER — SODIUM CHLORIDE 9 MG/ML
25 INJECTION, SOLUTION INTRAVENOUS PRN
Status: DISCONTINUED | OUTPATIENT
Start: 2021-04-17 | End: 2021-04-26 | Stop reason: HOSPADM

## 2021-04-17 RX ORDER — ACETAMINOPHEN 650 MG/1
650 SUPPOSITORY RECTAL EVERY 4 HOURS PRN
Status: DISCONTINUED | OUTPATIENT
Start: 2021-04-17 | End: 2021-04-26 | Stop reason: HOSPADM

## 2021-04-17 RX ORDER — PANTOPRAZOLE SODIUM 40 MG/1
40 TABLET, DELAYED RELEASE ORAL DAILY
Status: DISCONTINUED | OUTPATIENT
Start: 2021-04-17 | End: 2021-04-18 | Stop reason: SDUPTHER

## 2021-04-17 RX ORDER — LORAZEPAM 2 MG/ML
0.5 INJECTION INTRAMUSCULAR ONCE
Status: COMPLETED | OUTPATIENT
Start: 2021-04-17 | End: 2021-04-17

## 2021-04-17 RX ORDER — ACETAMINOPHEN 325 MG/1
650 TABLET ORAL EVERY 4 HOURS PRN
Status: DISCONTINUED | OUTPATIENT
Start: 2021-04-17 | End: 2021-04-26 | Stop reason: HOSPADM

## 2021-04-17 RX ADMIN — LORAZEPAM 0.5 MG: 2 INJECTION INTRAMUSCULAR; INTRAVENOUS at 23:24

## 2021-04-17 RX ADMIN — ASPIRIN 81 MG 324 MG: 81 TABLET ORAL at 16:35

## 2021-04-17 RX ADMIN — SODIUM CHLORIDE, PRESERVATIVE FREE 10 ML: 5 INJECTION INTRAVENOUS at 23:05

## 2021-04-17 RX ADMIN — LISINOPRIL 20 MG: 20 TABLET ORAL at 23:05

## 2021-04-17 RX ADMIN — TAMSULOSIN HYDROCHLORIDE 0.8 MG: 0.4 CAPSULE ORAL at 23:05

## 2021-04-17 RX ADMIN — DULOXETINE HYDROCHLORIDE 30 MG: 30 CAPSULE, DELAYED RELEASE ORAL at 23:05

## 2021-04-17 RX ADMIN — DOXAZOSIN 1 MG: 2 TABLET ORAL at 23:05

## 2021-04-17 RX ADMIN — IOPAMIDOL 75 ML: 755 INJECTION, SOLUTION INTRAVENOUS at 15:21

## 2021-04-17 RX ADMIN — FUROSEMIDE 20 MG: 10 INJECTION, SOLUTION INTRAMUSCULAR; INTRAVENOUS at 16:35

## 2021-04-17 RX ADMIN — NITROGLYCERIN 0.5 INCH: 20 OINTMENT TOPICAL at 16:35

## 2021-04-17 RX ADMIN — PANTOPRAZOLE SODIUM 40 MG: 40 TABLET, DELAYED RELEASE ORAL at 23:05

## 2021-04-17 ASSESSMENT — PAIN SCALES - GENERAL: PAINLEVEL_OUTOF10: 0

## 2021-04-17 NOTE — H&P
Hospital Medicine  History and Physical    PCP: DELISA Gutierrez CNP  Patient Name: Suad Blas    Date of Service: Pt seen/examined on 4/17/21 and admitted to Inpatient with expected LOS greater than two midnights due to medical therapy    CHIEF COMPLAINT:  Pt c/o shortness of breath, hypoxia  HISTORY OF PRESENT ILLNESS: Pt is an 54y.o. year-old male with a history of hypertension, COPD and muscular dytrophy. Felicia Anis to the emergency room following a 2 to 3-day history of worsening of his baseline shortness of breath. He states that he has shortness of breath at baseline due to muscular dystrophy. When his shortness of breath worsened he called EMS and was found to have an oxygen saturation of 69% on room air. Here he was found to have an oxygen saturation in the 70's on room air when his oxygen was removed. In the emergency room imaging did not reveal acute changes that would account for his hypoxia, his COVID-19 test was negative and he had no wheezing or abnormal findings on pulmonary exam. He is being admitted for further evaluation and treatment. Associated signs and symptoms do not include fever or chills, cough, sputum, hemoptysis or wheezing      Past Medical History:        Diagnosis Date    Anxiety     COPD, moderate ([address]     Erectile dysfunction of organic origin     Essential hypertension     GERD without esophagitis     Hyperglycemia     Muscular dystrophy ([address]     Narcotic abuse ([address] 01/24/2016    Suboxone on drug screen    Obstructive sleep apnea     BIPAP    Scoliosis        Past Surgical History:        Procedure Laterality Date    ACHILLES TENDON SURGERY      both legs    CHOLECYSTECTOMY         Allergies:  No known allergies    Medications Prior to Admission:    Prior to Admission medications    Medication Sig Start Date End Date Taking?  Authorizing Provider   doxazosin (CARDURA) 1 MG tablet Take 1 tablet by mouth daily 3/16/21   DELISA Gutierrez CNP DULoxetine (CYMBALTA) 30 MG extended release capsule Take 1 capsule by mouth 3 times daily 3/16/21 4/15/21  DELISA Torres CNP   tamsulosin [facility]) 0.4 MG capsule Take 2 capsules by mouth daily 3/13/21   DELISA Torres CNP   pantoprazole (PROTONIX) 40 MG tablet TAKE 1 TABLET BY MOUTH EVERY DAY 2/9/21   DELISA Torres CNP   lisinopril (PRINIVIL;ZESTRIL) 20 MG tablet TAKE 1 TABLET BY MOUTH DAILY 11/10/20   DELISA Torres CNP   metFORMIN (GLUCOPHAGE) 500 MG tablet Take 1 tablet by mouth daily (with breakfast) 9/16/20   DELISA Torres CNP   aspirin 81 MG chewable tablet Take 1 tablet by mouth daily 8/29/19 2/17/20  DELISA Denise - NP   vitamin C (ASCORBIC ACID) 500 MG tablet Take 500 mg by mouth daily    Historical Provider, MD   vitamin D3 (CHOLECALCIFEROL) 400 units TABS tablet Take 400 Units by mouth daily    Historical Provider, MD   albuterol sulfate HFA (PROAIR HFA) 108 (90 BASE) MCG/ACT inhaler INHALE 2 PUFFS INTO THE MOUTH EVERY 4 HOURS AS NEEDED FOR WHEEZING OR SHORTNESS OF BREATH 2/21/17   Jennei Angeles, DO   Multiple Vitamins-Minerals (MULTIVITAMIN & MINERAL PO) Take 1 tablet by mouth daily     Historical Provider, MD   vitamin B-12 (CYANOCOBALAMIN) 1000 MCG tablet Take 1,000 mcg by mouth daily. Historical Provider, MD       Family History:       Adopted: Yes   Problem Relation Age of Onset    Asthma Mother     Alcohol Abuse Mother     Heart Disease Father         MI    Asthma Maternal Aunt     High Blood Pressure Maternal Aunt     Diabetes Maternal Uncle     High Blood Pressure Maternal Uncle     Stroke Maternal Grandmother     Cancer Maternal Grandmother         Colon     Social History:   TOBACCO:   reports that he has never smoked. He has never used smokeless tobacco.  ETOH:   reports no history of alcohol use.   OCCUPATION:      REVIEW OF SYSTEMS:  A full review of systems was performed and is negative except for that which appears in the HPI    Physical Exam:    Vitals: BP (!) 138/92   Pulse 97   Temp 98.5 °F (36.9 °C) (Oral)   Resp 20   Wt 120 lb 13 oz (54.8 kg)   SpO2 93%   BMI 23.59 kg/m²   General appearance: WD/WN 54y.o. year-old male who is alert, appears stated age and is cooperative  HEENT: Head: Normocephalic, no lesions, without obvious abnormality. Eye: Normal external eye, conjunctiva, lids cornea, PEERL. Ears: Normal external ears. Non-tender. Nose: Normal external nose, mucus membranes and septum. Pharynx: Dental Hygiene adequate. Normal buccal mucosa. Normal pharynx. Neck: no adenopathy, no carotid bruit, no JVD, supple, symmetrical, trachea midline and thyroid not enlarged, symmetric, no tenderness/mass/nodules  Lungs: clear to auscultation bilaterally and no use of accessory muscles. Heart: regular rate and rhythm, S1, S2 normal, no murmur, click, rub or gallop and normal apical impulse  Abdomen: soft, non-tender; bowel sounds normal; no masses, no organomegaly  Extremities: extremities atraumatic, no cyanosis or edema and Homans sign is negative, no sign of DVT. Capillary Refill: Acceptable < 3 seconds   Peripheral Pulses: +3 easily felt, not easily obliterated with pressures   Skin: Skin color, texture, turgor normal. No rashes or lesions on exposed skin  Neurologic: Neurovascularly intact without any focal sensory/motor deficits. Cranial nerves: II-XII intact, grossly non-focal. Gait was not tested.   Mental Status: Alert and oriented, thought content appropriate, normal insight    CBC:   Recent Labs     04/17/21  1421   WBC 10.8   HGB 11.1*        BMP:    Recent Labs     04/17/21  1421      K 4.4   CL 95*   CO2 34*   BUN 17   CREATININE <0.5*   GLUCOSE 129*     Troponin:   Recent Labs     04/17/21  1421   TROPONINI 0.05*     PT/INR:  No results found for: PTINR  U/A:    Lab Results   Component Value Date    LEUKOCYTESUR Negative 04/17/2021    RBCUA 7 04/17/2021    SPECGRAV >1.030 has a history of elevated troponin in the past. This does not immediately appear to be the cause of his hypoxia (but may be caused by it). We will monitor him on telemetry and check serial troponin levels. Essential (primary) hypertension - continue home meds and monitor blood pressure    Muscular dystrophy ([facility] [address] with Generalized weakness/fatigue - provide supportive care        DVT Prophylaxis: Lovenox  Diet: DIET GENERAL;  Code Status: Prior  (Advanced care planning has been discussed with patient and/or responsible family member and is reflected in the code status.  Further orders associated with this have been entered if appropriate)    Disposition: Anticipate that patient will remain in the hospital for 2 to 3 days depending on further evaluation and clinical course     Please note that over 50 minutes was spent in evaluating the patient, review of records and results, discussion with staff/family, etc.    Tammi Jacinto MD

## 2021-04-17 NOTE — ED NOTES
ED SBAR report provider to [address] [place] [address], RN. Patient to be transported to Room 4270 via stretcher by transport tech. Patient transported with bedside cardiac monitor. IV site clean, dry, and intact. MEWS score and pain assessed as 0/10 and documented. Updated patient on plan of care.  Awaiting transport        Luz Maria Croft RN  04/17/21 9230

## 2021-04-17 NOTE — ED PROVIDER NOTES
[address]  [address] [address]  Dept: [phone]  Loc: [address] ENCOUNTER        This patient was not seen or evaluated by the attending physician. I evaluated this patient, the attending physician was available for consultation. CHIEF COMPLAINT    Chief Complaint   Patient presents with    Shortness of Breath     sob for a few days, pt has MD        HPI    Juan Luis Garcia is a 54 y.o. male who presents with shortness of breath. Onset was few days ago. He states that he is mildly short of breath at baseline due to his muscular dystrophy. He therefore called EMS. EMS on arrival noted a pulse ox of 69% on room air. When he arrived here he dipped into the 70s with removal of oxygen. This was documented. He appears in no acute distress at bedside. Patient does endorse some substernal chest pain, none currently. Is endorsing also some epigastric abdominal pain. No nausea vomiting or diarrhea. No recent fevers or chills. No new cough. The duration has been constant since the onset. He does not typically require any oxygen at home. Symptoms started spontaneously. No alleviating factors. Was brought to the ED via EMS for further evaluation and treatment. REVIEW OF SYSTEMS    Cardiac: + chest pain, no palpitations, no syncope  Respiratory: see HPI, no cough  Neurologic: no syncope or LOC  GI: no vomiting, no diarrhea  General: no measured fever  All other systems reviewed and are negative.     PAST MEDICAL & SURGICAL HISTORY    Past Medical History:   Diagnosis Date    Anxiety     COPD, moderate ([address]     Erectile dysfunction of organic origin     Essential hypertension     GERD without esophagitis     Hyperglycemia     Muscular dystrophy ([address]     Narcotic abuse ([address] 01/24/2016    Suboxone on drug screen    Obstructive sleep apnea     BIPAP    Scoliosis      Past Surgical History:   Procedure Laterality Date    ACHILLES TENDON SURGERY      both legs    CHOLECYSTECTOMY         CURRENT MEDICATIONS  (may include discharge medications prescribed in the ED)  Current Outpatient Rx   Medication Sig Dispense Refill    doxazosin (CARDURA) 1 MG tablet Take 1 tablet by mouth daily 30 tablet 3    DULoxetine (CYMBALTA) 30 MG extended release capsule Take 1 capsule by mouth 3 times daily 270 capsule 1    tamsulosin (FLOMAX) 0.4 MG capsule Take 2 capsules by mouth daily 60 capsule 3    pantoprazole (PROTONIX) 40 MG tablet TAKE 1 TABLET BY MOUTH EVERY DAY 90 tablet 1    lisinopril (PRINIVIL;ZESTRIL) 20 MG tablet TAKE 1 TABLET BY MOUTH DAILY 90 tablet 1    metFORMIN (GLUCOPHAGE) 500 MG tablet Take 1 tablet by mouth daily (with breakfast) 30 tablet 1    aspirin 81 MG chewable tablet Take 1 tablet by mouth daily 30 tablet 0    vitamin C (ASCORBIC ACID) 500 MG tablet Take 500 mg by mouth daily      vitamin D3 (CHOLECALCIFEROL) 400 units TABS tablet Take 400 Units by mouth daily      albuterol sulfate HFA (PROAIR HFA) 108 (90 BASE) MCG/ACT inhaler INHALE 2 PUFFS INTO THE MOUTH EVERY 4 HOURS AS NEEDED FOR WHEEZING OR SHORTNESS OF BREATH 8.5 g 0    Multiple Vitamins-Minerals (MULTIVITAMIN & MINERAL PO) Take 1 tablet by mouth daily       vitamin B-12 (CYANOCOBALAMIN) 1000 MCG tablet Take 1,000 mcg by mouth daily.            ALLERGIES    Allergies   Allergen Reactions    No Known Allergies        SOCIAL & FAMILY HISTORY    Social History     Socioeconomic History    Marital status: Single     Spouse name: Not on file    Number of children: 1    Years of education: Not on file    Highest education level: Not on file   Occupational History    Occupation: disabled with MD   Social Needs    Financial resource strain: Somewhat hard    Food insecurity     Worry: Never true     Inability: Never true    Transportation needs     Medical: No     Non-medical: No   Tobacco Use    Smoking status: Never Smoker    Smokeless tobacco: Never Used   Substance and Sexual Activity    Alcohol use: No    Drug use: Yes     Types: Marijuana    Sexual activity: Not on file   Lifestyle    Physical activity     Days per week: Not on file     Minutes per session: Not on file    Stress: Not on file   Relationships    Social connections     Talks on phone: Not on file     Gets together: Not on file     Attends Taoism service: Not on file     Active member of club or organization: Not on file     Attends meetings of clubs or organizations: Not on file     Relationship status: Not on file    Intimate partner violence     Fear of current or ex partner: Not on file     Emotionally abused: Not on file     Physically abused: Not on file     Forced sexual activity: Not on file   Other Topics Concern    Not on file   Social History Narrative    Lives with mom     Family History   Adopted: Yes   Problem Relation Age of Onset    Asthma Mother     Alcohol Abuse Mother     Heart Disease Father         MI    Asthma Maternal Aunt     High Blood Pressure Maternal Aunt     Diabetes Maternal Uncle     High Blood Pressure Maternal Uncle     Stroke Maternal Grandmother     Cancer Maternal Grandmother         Colon       PHYSICAL EXAM    VITAL SIGNS: BP (!) 148/100   Pulse 87   Temp 98.5 °F (36.9 °C) (Oral)   Resp 19   Wt 120 lb 13 oz (54.8 kg)   SpO2 100%   BMI 23.59 kg/m²    Constitutional:  Well developed, well nourished, no acute distress   HENT:  Atraumatic, moist mucus membranes  Neck: supple, no JVD   Respiratory:  Lungs CTA throughout, no wheezes, no retractions   Cardiovascular:  regular rate, no murmurs  Vascular: Radial and DP pulses 2+ and equal bilaterally  GI:  Soft, nontender, normal bowel sounds  Musculoskeletal:  no lower extremity edema, no lower extremity asymmetry, no calf tenderness,  no thigh tenderness, no acute deformities  Integument:  Skin is warm and dry, no petechiae   Neurologic:  Alert & oriented, no slurred speech  Psych: Pleasant affect, no hallucinations      EKG    Please see the physician note for EKG interpretation.     LABS  Results for orders placed or performed during the hospital encounter of 04/17/21   SARS-CoV-2 NAAT (Rapid)    Specimen: Nasopharyngeal Swab   Result Value Ref Range    SARS-CoV-2, NAAT Not Detected Not Detected   Urinalysis Reflex to Culture    Specimen: Urine, clean catch   Result Value Ref Range    Color, UA YELLOW Straw/Yellow    Clarity, UA Clear Clear    Glucose, Ur Negative Negative mg/dL    Bilirubin Urine SMALL (A) Negative    Ketones, Urine Negative Negative mg/dL    Specific Gravity, UA >1.030 1.005 - 1.030    Blood, Urine SMALL (A) Negative    pH, UA 5.5 5.0 - 8.0    Protein,  (A) Negative mg/dL    Urobilinogen, Urine 1.0 <2.0 E.U./dL    Nitrite, Urine Negative Negative    Leukocyte Esterase, Urine Negative Negative    Microscopic Examination YES     Urine Type NotGiven    Lactate, Sepsis   Result Value Ref Range    Lactic Acid, Sepsis 1.7 0.4 - 1.9 mmol/L   CBC Auto Differential   Result Value Ref Range    WBC 10.8 4.0 - 11.0 K/uL    RBC 4.27 4.20 - 5.90 M/uL    Hemoglobin 11.1 (L) 13.5 - 17.5 g/dL    Hematocrit 36.0 (L) 40.5 - 52.5 %    MCV 84.3 80.0 - 100.0 fL    MCH 26.1 26.0 - 34.0 pg    MCHC 30.9 (L) 31.0 - 36.0 g/dL    RDW 16.0 (H) 12.4 - 15.4 %    Platelets 217 061 - 457 K/uL    MPV 8.1 5.0 - 10.5 fL    PLATELET SLIDE REVIEW Adequate     SLIDE REVIEW see below     Neutrophils % 91.9 %    Lymphocytes % 5.4 %    Monocytes % 2.1 %    Eosinophils % 0.1 %    Basophils % 0.5 %    Neutrophils Absolute 9.9 (H) 1.7 - 7.7 K/uL    Lymphocytes Absolute 0.6 (L) 1.0 - 5.1 K/uL    Monocytes Absolute 0.2 0.0 - 1.3 K/uL    Eosinophils Absolute 0.0 0.0 - 0.6 K/uL    Basophils Absolute 0.1 0.0 - 0.2 K/uL   Comprehensive Metabolic Panel w/ Reflex to MG   Result Value Ref Range    Sodium 138 136 - 145 mmol/L    Potassium reflex Magnesium 4.4 3.5 - 5.1 mmol/L Chloride 95 (L) 99 - 110 mmol/L    CO2 34 (H) 21 - 32 mmol/L    Anion Gap 9 3 - 16    Glucose 129 (H) 70 - 99 mg/dL    BUN 17 7 - 20 mg/dL    CREATININE <0.5 (L) 0.9 - 1.3 mg/dL    GFR Non-African American >60 >60    GFR African American >60 >60    Calcium 8.5 8.3 - 10.6 mg/dL    Total Protein 6.6 6.4 - 8.2 g/dL    Albumin 3.5 3.4 - 5.0 g/dL    Albumin/Globulin Ratio 1.1 1.1 - 2.2    Total Bilirubin <0.2 0.0 - 1.0 mg/dL    Alkaline Phosphatase 66 40 - 129 U/L    ALT 23 10 - 40 U/L    AST 18 15 - 37 U/L    Globulin 3.1 g/dL   Lipase   Result Value Ref Range    Lipase 16.0 13.0 - 60.0 U/L   Troponin   Result Value Ref Range    Troponin 0.05 (H) <0.01 ng/mL   Brain Natriuretic Peptide   Result Value Ref Range    Pro-BNP 4,589 (H) 0 - 124 pg/mL   D-Dimer, Quantitative   Result Value Ref Range    D-Dimer, Quant 460 (H) 0 - 229 ng/mL DDU   Procalcitonin   Result Value Ref Range    Procalcitonin 0.04 0.00 - 0.15 ng/mL   Blood gas, venous   Result Value Ref Range    pH, Duran 7.226 (L) 7.350 - 7.450    pCO2, Duran 99.4 (H) 40.0 - 50.0 mmHg    pO2, Duran 75 Not Established mmHg    HCO3, Venous 41 (H) 23 - 29 mmol/L    Base Excess, Duran 10.4 Not Established mmol/L    O2 Sat, Duran 92 Not Established %    Carboxyhemoglobin 1.8 %    MetHgb, Duran 0.5 <1.5 %    TC02 (Calc), Duran 44 Not Established mmol/L    O2 Content, Duran 14 Not Established mL/dL    O2 Therapy Unknown    CK   Result Value Ref Range    Total CK 41 39 - 308 U/L         RADIOLOGY/PROCEDURES    CT CHEST PULMONARY EMBOLISM W CONTRAST   Final Result   Linear atelectasis versus scarring right middle lobe. No evidence for acute   infiltrates. This study is not optimized for evaluation of the pulmonary arteries. There are no central filling defects however the possibly of small distal   pulmonary emboli cannot be totally excluded         XR CHEST PORTABLE   Final Result   Moderate cardiomegaly without pulmonary vascular congestion.   Mild bibasilar   atelectasis worse on PV O2 75. CXR findings as above. EKG interpreted by physician, in comparison to an EKG in 2019: nonspecific T wave inversions in leads III and aVF but otherwise no acute EKG changes. Troponin elevated at 0.05. BNP also elevated at 4589. Patient cannot have PERC criteria applied, therefore D-dimer was obtained. D-dimer is 460. CT PE study ordered and as read by the radiologist as above. Given his hypoxia, new oxygen requirement, an elevated troponin I do believe he needs to be admitted. I therefore consulted the hospitalist who agreed to admit patient and write orders for admission. He did want me to touch base with cardiology about heparinizing the patient. I spoke to Dr. Wally Cruz who did not feel strongly either way. I therefore spoke again to Dr. Ruby Watts, he will follow the patient's troponin trends closely and decide based off of an increase or decrease in his troponins whether or not to heparinize at that time on an inpatient basis. FINAL IMPRESSION    1. Hypoxia    2. Dyspnea and respiratory abnormalities    3.  Elevated troponin          PLAN  Admission to the hospital    (Please note that this note was completed with a voice recognition program.  Every attempt was made to edit the dictations, but inevitably there remain words that are mis-transcribed.)        Andra Cho, DELISA - CNP  04/17/21 [id number]

## 2021-04-17 NOTE — PROGRESS NOTES
Full consult in Am. Appears the patient is in acute hypercapnic on chronic hypercapnic resp failure based on VBG. Will start on bipap intermittently with ABG in ~ 4 hours to demonstrate improvement.

## 2021-04-18 ENCOUNTER — APPOINTMENT (OUTPATIENT)
Dept: GENERAL RADIOLOGY | Age: 56
DRG: 207 | End: 2021-04-18
Payer: MEDICARE

## 2021-04-18 LAB
ANION GAP SERPL CALCULATED.3IONS-SCNC: 11 MMOL/L (ref 3–16)
ANION GAP SERPL CALCULATED.3IONS-SCNC: 16 MMOL/L (ref 3–16)
APTT: 23 SEC (ref 24.2–36.2)
APTT: 48.3 SEC (ref 24.2–36.2)
BASE EXCESS ARTERIAL: 14.1 MMOL/L (ref -3–3)
BASE EXCESS ARTERIAL: 19.5 MMOL/L (ref -3–3)
BASOPHILS ABSOLUTE: 0 K/UL (ref 0–0.2)
BASOPHILS RELATIVE PERCENT: 0.6 %
BILIRUBIN URINE: NEGATIVE
BLOOD, URINE: ABNORMAL
BUN BLDV-MCNC: 21 MG/DL (ref 7–20)
BUN BLDV-MCNC: 24 MG/DL (ref 7–20)
CALCIUM SERPL-MCNC: 7.1 MG/DL (ref 8.3–10.6)
CALCIUM SERPL-MCNC: 8.2 MG/DL (ref 8.3–10.6)
CARBOXYHEMOGLOBIN ARTERIAL: 0.6 % (ref 0–1.5)
CARBOXYHEMOGLOBIN ARTERIAL: 1 % (ref 0–1.5)
CHLORIDE BLD-SCNC: 94 MMOL/L (ref 99–110)
CHLORIDE BLD-SCNC: 95 MMOL/L (ref 99–110)
CLARITY: CLEAR
CO2: 29 MMOL/L (ref 21–32)
CO2: 32 MMOL/L (ref 21–32)
COLOR: YELLOW
COMMENT UA: ABNORMAL
CREAT SERPL-MCNC: 0.9 MG/DL (ref 0.9–1.3)
CREAT SERPL-MCNC: 0.9 MG/DL (ref 0.9–1.3)
EKG ATRIAL RATE: 73 BPM
EKG ATRIAL RATE: 91 BPM
EKG DIAGNOSIS: NORMAL
EKG DIAGNOSIS: NORMAL
EKG P AXIS: 35 DEGREES
EKG P AXIS: 51 DEGREES
EKG P-R INTERVAL: 154 MS
EKG P-R INTERVAL: 160 MS
EKG Q-T INTERVAL: 342 MS
EKG Q-T INTERVAL: 412 MS
EKG QRS DURATION: 94 MS
EKG QRS DURATION: 98 MS
EKG QTC CALCULATION (BAZETT): 420 MS
EKG QTC CALCULATION (BAZETT): 453 MS
EKG R AXIS: 45 DEGREES
EKG R AXIS: 7 DEGREES
EKG T AXIS: -16 DEGREES
EKG T AXIS: -21 DEGREES
EKG VENTRICULAR RATE: 73 BPM
EKG VENTRICULAR RATE: 91 BPM
EOSINOPHILS ABSOLUTE: 0 K/UL (ref 0–0.6)
EOSINOPHILS RELATIVE PERCENT: 0.1 %
EPITHELIAL CELLS, UA: 18 /HPF (ref 0–5)
GFR AFRICAN AMERICAN: >60
GFR AFRICAN AMERICAN: >60
GFR NON-AFRICAN AMERICAN: >60
GFR NON-AFRICAN AMERICAN: >60
GLUCOSE BLD-MCNC: 100 MG/DL (ref 70–99)
GLUCOSE BLD-MCNC: 117 MG/DL (ref 70–99)
GLUCOSE BLD-MCNC: 168 MG/DL (ref 70–99)
GLUCOSE BLD-MCNC: 88 MG/DL (ref 70–99)
GLUCOSE BLD-MCNC: 95 MG/DL (ref 70–99)
GLUCOSE URINE: 250 MG/DL
HCO3 ARTERIAL: 37.5 MMOL/L (ref 21–29)
HCO3 ARTERIAL: 43.4 MMOL/L (ref 21–29)
HCT VFR BLD CALC: 34.5 % (ref 40.5–52.5)
HCT VFR BLD CALC: 36.1 % (ref 40.5–52.5)
HEMOGLOBIN, ART, EXTENDED: 10.9 G/DL (ref 13.5–17.5)
HEMOGLOBIN, ART, EXTENDED: 12 G/DL (ref 13.5–17.5)
HEMOGLOBIN: 10.7 G/DL (ref 13.5–17.5)
HEMOGLOBIN: 11.3 G/DL (ref 13.5–17.5)
HYALINE CASTS: 14 /LPF (ref 0–8)
KETONES, URINE: ABNORMAL MG/DL
LEUKOCYTE ESTERASE, URINE: NEGATIVE
LYMPHOCYTES ABSOLUTE: 1.4 K/UL (ref 1–5.1)
LYMPHOCYTES RELATIVE PERCENT: 19.5 %
MAGNESIUM: 1.2 MG/DL (ref 1.8–2.4)
MAGNESIUM: 3.1 MG/DL (ref 1.8–2.4)
MCH RBC QN AUTO: 25.7 PG (ref 26–34)
MCH RBC QN AUTO: 26.4 PG (ref 26–34)
MCHC RBC AUTO-ENTMCNC: 31 G/DL (ref 31–36)
MCHC RBC AUTO-ENTMCNC: 31.2 G/DL (ref 31–36)
MCV RBC AUTO: 82.8 FL (ref 80–100)
MCV RBC AUTO: 84.7 FL (ref 80–100)
METHEMOGLOBIN ARTERIAL: 0.3 %
METHEMOGLOBIN ARTERIAL: 0.4 %
MICROSCOPIC EXAMINATION: YES
MONOCYTES ABSOLUTE: 0.2 K/UL (ref 0–1.3)
MONOCYTES RELATIVE PERCENT: 2.6 %
NEUTROPHILS ABSOLUTE: 5.6 K/UL (ref 1.7–7.7)
NEUTROPHILS RELATIVE PERCENT: 77.2 %
NITRITE, URINE: NEGATIVE
O2 CONTENT ARTERIAL: 16 ML/DL
O2 CONTENT ARTERIAL: 16 ML/DL
O2 SAT, ARTERIAL: 100 %
O2 SAT, ARTERIAL: 93.8 %
O2 THERAPY: ABNORMAL
O2 THERAPY: ABNORMAL
PCO2 ARTERIAL: 41.1 MMHG (ref 35–45)
PCO2 ARTERIAL: 44.7 MMHG (ref 35–45)
PDW BLD-RTO: 15.6 % (ref 12.4–15.4)
PDW BLD-RTO: 15.7 % (ref 12.4–15.4)
PERFORMED ON: ABNORMAL
PERFORMED ON: ABNORMAL
PERFORMED ON: NORMAL
PH ARTERIAL: 7.57 (ref 7.35–7.45)
PH ARTERIAL: 7.59 (ref 7.35–7.45)
PH UA: 6.5 (ref 5–8)
PHOSPHORUS: 1.2 MG/DL (ref 2.5–4.9)
PHOSPHORUS: 3.7 MG/DL (ref 2.5–4.9)
PLATELET # BLD: 261 K/UL (ref 135–450)
PLATELET # BLD: 333 K/UL (ref 135–450)
PMV BLD AUTO: 7.8 FL (ref 5–10.5)
PMV BLD AUTO: 8.1 FL (ref 5–10.5)
PO2 ARTERIAL: 254 MMHG (ref 75–108)
PO2 ARTERIAL: 61.2 MMHG (ref 75–108)
POTASSIUM REFLEX MAGNESIUM: 3.6 MMOL/L (ref 3.5–5.1)
POTASSIUM SERPL-SCNC: 4 MMOL/L (ref 3.5–5.1)
PROTEIN UA: 100 MG/DL
RBC # BLD: 4.17 M/UL (ref 4.2–5.9)
RBC # BLD: 4.26 M/UL (ref 4.2–5.9)
RBC UA: 45 /HPF (ref 0–4)
SODIUM BLD-SCNC: 135 MMOL/L (ref 136–145)
SODIUM BLD-SCNC: 142 MMOL/L (ref 136–145)
SPECIFIC GRAVITY UA: >1.03 (ref 1–1.03)
TCO2 ARTERIAL: 38.8 MMOL/L
TCO2 ARTERIAL: 44.7 MMOL/L
TROPONIN: 0.06 NG/ML
TROPONIN: 0.15 NG/ML
TROPONIN: 0.16 NG/ML
URINE REFLEX TO CULTURE: YES
URINE TYPE: ABNORMAL
UROBILINOGEN, URINE: 2 E.U./DL
WBC # BLD: 11.8 K/UL (ref 4–11)
WBC # BLD: 7.3 K/UL (ref 4–11)
WBC UA: 10 /HPF (ref 0–5)

## 2021-04-18 PROCEDURE — 2700000000 HC OXYGEN THERAPY PER DAY

## 2021-04-18 PROCEDURE — 83735 ASSAY OF MAGNESIUM: CPT

## 2021-04-18 PROCEDURE — 2500000003 HC RX 250 WO HCPCS: Performed by: INTERNAL MEDICINE

## 2021-04-18 PROCEDURE — 2580000003 HC RX 258: Performed by: INTERNAL MEDICINE

## 2021-04-18 PROCEDURE — 93005 ELECTROCARDIOGRAM TRACING: CPT | Performed by: INTERNAL MEDICINE

## 2021-04-18 PROCEDURE — 36592 COLLECT BLOOD FROM PICC: CPT

## 2021-04-18 PROCEDURE — 94761 N-INVAS EAR/PLS OXIMETRY MLT: CPT

## 2021-04-18 PROCEDURE — 93010 ELECTROCARDIOGRAM REPORT: CPT | Performed by: INTERNAL MEDICINE

## 2021-04-18 PROCEDURE — 6360000002 HC RX W HCPCS: Performed by: INTERNAL MEDICINE

## 2021-04-18 PROCEDURE — 36620 INSERTION CATHETER ARTERY: CPT

## 2021-04-18 PROCEDURE — 94002 VENT MGMT INPAT INIT DAY: CPT

## 2021-04-18 PROCEDURE — 87040 BLOOD CULTURE FOR BACTERIA: CPT

## 2021-04-18 PROCEDURE — 6370000000 HC RX 637 (ALT 250 FOR IP): Performed by: INTERNAL MEDICINE

## 2021-04-18 PROCEDURE — 31500 INSERT EMERGENCY AIRWAY: CPT

## 2021-04-18 PROCEDURE — 81001 URINALYSIS AUTO W/SCOPE: CPT

## 2021-04-18 PROCEDURE — 85730 THROMBOPLASTIN TIME PARTIAL: CPT

## 2021-04-18 PROCEDURE — 51702 INSERT TEMP BLADDER CATH: CPT

## 2021-04-18 PROCEDURE — C9113 INJ PANTOPRAZOLE SODIUM, VIA: HCPCS | Performed by: INTERNAL MEDICINE

## 2021-04-18 PROCEDURE — 80048 BASIC METABOLIC PNL TOTAL CA: CPT

## 2021-04-18 PROCEDURE — 36556 INSERT NON-TUNNEL CV CATH: CPT

## 2021-04-18 PROCEDURE — 71045 X-RAY EXAM CHEST 1 VIEW: CPT

## 2021-04-18 PROCEDURE — 85025 COMPLETE CBC W/AUTO DIFF WBC: CPT

## 2021-04-18 PROCEDURE — 99291 CRITICAL CARE FIRST HOUR: CPT | Performed by: INTERNAL MEDICINE

## 2021-04-18 PROCEDURE — 85027 COMPLETE CBC AUTOMATED: CPT

## 2021-04-18 PROCEDURE — 36415 COLL VENOUS BLD VENIPUNCTURE: CPT

## 2021-04-18 PROCEDURE — 99222 1ST HOSP IP/OBS MODERATE 55: CPT | Performed by: INTERNAL MEDICINE

## 2021-04-18 PROCEDURE — 87086 URINE CULTURE/COLONY COUNT: CPT

## 2021-04-18 PROCEDURE — 36600 WITHDRAWAL OF ARTERIAL BLOOD: CPT

## 2021-04-18 PROCEDURE — 94003 VENT MGMT INPAT SUBQ DAY: CPT

## 2021-04-18 PROCEDURE — 82803 BLOOD GASES ANY COMBINATION: CPT

## 2021-04-18 PROCEDURE — 84484 ASSAY OF TROPONIN QUANT: CPT

## 2021-04-18 PROCEDURE — 84100 ASSAY OF PHOSPHORUS: CPT

## 2021-04-18 PROCEDURE — 2000000000 HC ICU R&B

## 2021-04-18 RX ORDER — SODIUM CHLORIDE, SODIUM LACTATE, POTASSIUM CHLORIDE, CALCIUM CHLORIDE 600; 310; 30; 20 MG/100ML; MG/100ML; MG/100ML; MG/100ML
500 INJECTION, SOLUTION INTRAVENOUS ONCE
Status: COMPLETED | OUTPATIENT
Start: 2021-04-18 | End: 2021-04-18

## 2021-04-18 RX ORDER — HEPARIN SODIUM 1000 [USP'U]/ML
30 INJECTION, SOLUTION INTRAVENOUS; SUBCUTANEOUS PRN
Status: DISCONTINUED | OUTPATIENT
Start: 2021-04-18 | End: 2021-04-18

## 2021-04-18 RX ORDER — 0.9 % SODIUM CHLORIDE 0.9 %
2000 INTRAVENOUS SOLUTION INTRAVENOUS ONCE
Status: COMPLETED | OUTPATIENT
Start: 2021-04-18 | End: 2021-04-18

## 2021-04-18 RX ORDER — MAGNESIUM SULFATE 1 G/100ML
1000 INJECTION INTRAVENOUS PRN
Status: DISCONTINUED | OUTPATIENT
Start: 2021-04-18 | End: 2021-04-26 | Stop reason: HOSPADM

## 2021-04-18 RX ORDER — SODIUM CHLORIDE 9 MG/ML
10 INJECTION INTRAVENOUS DAILY
Status: DISCONTINUED | OUTPATIENT
Start: 2021-04-18 | End: 2021-04-22

## 2021-04-18 RX ORDER — PROPOFOL 10 MG/ML
5-50 INJECTION, EMULSION INTRAVENOUS
Status: DISCONTINUED | OUTPATIENT
Start: 2021-04-18 | End: 2021-04-22

## 2021-04-18 RX ORDER — MAGNESIUM SULFATE IN WATER 40 MG/ML
4000 INJECTION, SOLUTION INTRAVENOUS ONCE
Status: COMPLETED | OUTPATIENT
Start: 2021-04-18 | End: 2021-04-18

## 2021-04-18 RX ORDER — HEPARIN SODIUM 1000 [USP'U]/ML
1260 INJECTION, SOLUTION INTRAVENOUS; SUBCUTANEOUS ONCE
Status: COMPLETED | OUTPATIENT
Start: 2021-04-18 | End: 2021-04-18

## 2021-04-18 RX ORDER — CHLORHEXIDINE GLUCONATE 0.12 MG/ML
15 RINSE ORAL 2 TIMES DAILY
Status: DISCONTINUED | OUTPATIENT
Start: 2021-04-18 | End: 2021-04-22

## 2021-04-18 RX ORDER — PANTOPRAZOLE SODIUM 40 MG/10ML
40 INJECTION, POWDER, LYOPHILIZED, FOR SOLUTION INTRAVENOUS DAILY
Status: DISCONTINUED | OUTPATIENT
Start: 2021-04-18 | End: 2021-04-22

## 2021-04-18 RX ORDER — HEPARIN SODIUM 1000 [USP'U]/ML
2500 INJECTION, SOLUTION INTRAVENOUS; SUBCUTANEOUS ONCE
Status: COMPLETED | OUTPATIENT
Start: 2021-04-18 | End: 2021-04-18

## 2021-04-18 RX ORDER — HEPARIN SODIUM 10000 [USP'U]/100ML
0-3000 INJECTION, SOLUTION INTRAVENOUS CONTINUOUS
Status: DISCONTINUED | OUTPATIENT
Start: 2021-04-18 | End: 2021-04-24

## 2021-04-18 RX ORDER — POTASSIUM CHLORIDE 29.8 MG/ML
20 INJECTION INTRAVENOUS PRN
Status: DISCONTINUED | OUTPATIENT
Start: 2021-04-18 | End: 2021-04-26 | Stop reason: HOSPADM

## 2021-04-18 RX ORDER — MAGNESIUM SULFATE IN WATER 40 MG/ML
2000 INJECTION, SOLUTION INTRAVENOUS ONCE
Status: COMPLETED | OUTPATIENT
Start: 2021-04-18 | End: 2021-04-18

## 2021-04-18 RX ADMIN — MAGNESIUM SULFATE HEPTAHYDRATE 4000 MG: 40 INJECTION, SOLUTION INTRAVENOUS at 11:53

## 2021-04-18 RX ADMIN — SODIUM CHLORIDE, PRESERVATIVE FREE 10 ML: 5 INJECTION INTRAVENOUS at 20:37

## 2021-04-18 RX ADMIN — POTASSIUM PHOSPHATE, MONOBASIC AND POTASSIUM PHOSPHATE, DIBASIC 30 MMOL: 224; 236 INJECTION, SOLUTION, CONCENTRATE INTRAVENOUS at 13:15

## 2021-04-18 RX ADMIN — MEROPENEM 500 MG: 500 INJECTION, POWDER, FOR SOLUTION INTRAVENOUS at 12:01

## 2021-04-18 RX ADMIN — PROPOFOL 10 MCG/KG/MIN: 10 INJECTION, EMULSION INTRAVENOUS at 07:41

## 2021-04-18 RX ADMIN — AZITHROMYCIN MONOHYDRATE 500 MG: 500 INJECTION, POWDER, LYOPHILIZED, FOR SOLUTION INTRAVENOUS at 06:45

## 2021-04-18 RX ADMIN — VANCOMYCIN HYDROCHLORIDE 1000 MG: 1 INJECTION, POWDER, LYOPHILIZED, FOR SOLUTION INTRAVENOUS at 07:27

## 2021-04-18 RX ADMIN — SODIUM CHLORIDE 1000 ML: 9 INJECTION, SOLUTION INTRAVENOUS at 12:20

## 2021-04-18 RX ADMIN — PANTOPRAZOLE SODIUM 40 MG: 40 INJECTION, POWDER, FOR SOLUTION INTRAVENOUS at 11:29

## 2021-04-18 RX ADMIN — MEROPENEM 500 MG: 500 INJECTION, POWDER, FOR SOLUTION INTRAVENOUS at 20:21

## 2021-04-18 RX ADMIN — HEPARIN SODIUM 1260 UNITS: 1000 INJECTION INTRAVENOUS; SUBCUTANEOUS at 20:21

## 2021-04-18 RX ADMIN — SODIUM CHLORIDE, POTASSIUM CHLORIDE, SODIUM LACTATE AND CALCIUM CHLORIDE 500 ML: 600; 310; 30; 20 INJECTION, SOLUTION INTRAVENOUS at 05:10

## 2021-04-18 RX ADMIN — PROPOFOL 5 MCG/KG/MIN: 10 INJECTION, EMULSION INTRAVENOUS at 04:20

## 2021-04-18 RX ADMIN — MUPIROCIN: 20 OINTMENT TOPICAL at 20:37

## 2021-04-18 RX ADMIN — HEPARIN SODIUM 2500 UNITS: 1000 INJECTION INTRAVENOUS; SUBCUTANEOUS at 11:43

## 2021-04-18 RX ADMIN — CHLORHEXIDINE GLUCONATE 15 ML: 1.2 RINSE ORAL at 12:01

## 2021-04-18 RX ADMIN — SODIUM CHLORIDE 1000 ML: 9 INJECTION, SOLUTION INTRAVENOUS at 11:14

## 2021-04-18 RX ADMIN — HEPARIN SODIUM 500 UNITS/HR: 10000 INJECTION, SOLUTION INTRAVENOUS at 11:43

## 2021-04-18 RX ADMIN — Medication 10 ML: at 11:29

## 2021-04-18 RX ADMIN — MUPIROCIN: 20 OINTMENT TOPICAL at 12:30

## 2021-04-18 RX ADMIN — MAGNESIUM SULFATE HEPTAHYDRATE 2000 MG: 40 INJECTION, SOLUTION INTRAVENOUS at 16:50

## 2021-04-18 RX ADMIN — Medication 10 MCG/MIN: at 04:02

## 2021-04-18 RX ADMIN — SODIUM CHLORIDE, PRESERVATIVE FREE 10 ML: 5 INJECTION INTRAVENOUS at 11:29

## 2021-04-18 RX ADMIN — VASOPRESSIN 0.03 UNITS/MIN: 20 INJECTION INTRAVENOUS at 06:15

## 2021-04-18 RX ADMIN — MEROPENEM 500 MG: 500 INJECTION, POWDER, FOR SOLUTION INTRAVENOUS at 06:40

## 2021-04-18 RX ADMIN — CHLORHEXIDINE GLUCONATE 15 ML: 1.2 RINSE ORAL at 20:38

## 2021-04-18 ASSESSMENT — PULMONARY FUNCTION TESTS
PIF_VALUE: 25
PIF_VALUE: 28
PIF_VALUE: 28
PIF_VALUE: 32
PIF_VALUE: 32
PIF_VALUE: 23
PIF_VALUE: 26
PIF_VALUE: 26
PIF_VALUE: 32
PIF_VALUE: 31
PIF_VALUE: 26
PIF_VALUE: 23
PIF_VALUE: 31
PIF_VALUE: 24
PIF_VALUE: 26
PIF_VALUE: 25
PIF_VALUE: 23
PIF_VALUE: 30
PIF_VALUE: 24
PIF_VALUE: 23
PIF_VALUE: 32
PIF_VALUE: 35
PIF_VALUE: 27
PIF_VALUE: 25
PIF_VALUE: 29
PIF_VALUE: 23
PIF_VALUE: 26
PIF_VALUE: 31
PIF_VALUE: 23
PIF_VALUE: 26
PIF_VALUE: 35
PIF_VALUE: 32

## 2021-04-18 ASSESSMENT — PAIN SCALES - GENERAL
PAINLEVEL_OUTOF10: 0

## 2021-04-18 NOTE — CONSULTS
REASON FOR CONSULTATION/CC: acute hypercapnia       Consult at request of Pérez Serrato MD for    PCP: DELISA Kenny - CNP  Established Pulmonologist:   None    HISTORY OF PRESENT ILLNESS: Alejandra Crowley is a 54y.o. year old male with a history of muscular dystrophy      Patient presented with shortness of breath and weakness with noted hypoxemia requiring 4 L nasal cannula. Venous blood gas completed demonstrating acute on chronic hypercapnic respiratory failure. NIF negative at 28. BiPAP was ordered and discussed with nurse. As documented, the patient was offered BiPAP but kept pulling this off with desaturations to the 60s percent. Joy Ríos Unfortunately, the patient declined overnight and was refusing to wear BiPAP continuously. He was warned of the risk. Unfortunately these risk materialized and required intubation. Per the family, he has been too weak to take care of himself at home secondary to muscular dystrophy. Further, approximately 6 months ago, his neurologist advised that his muscular dystrophy was progressing with myocardial and pulmonary progression. They had living will discussion. He does not want a trach or PEG. Assessment:     Muscular dystrophy  Acute on chronic hypercapnic respiratory failure with elevated PCO2 at 99 with pH less than 7.35. Hypomagnesemia  Hypophosphatemia  History of COPD per chart  Hypertension  History of narcotic abuse  History of sleep apnea on BiPAP per chart  Normal echo 2019      Plan:      Hospital Day 1     Muscular dystrophy  -Consult neurology  -Poor prognosis. Patient had significant progressions over the last 1 year with a end-of-life discussion approximate 6 months ago secondary to myocardial and pulmonary effects of muscular dystrophy. Acute on chronic hypercapnic respiratory failure  -Likely secondary to weakness secondary to muscular dystrophy  -Replace electrolytes including phosphorus and monitor.  -No overt signs of pneumonia. Atelectasis likely secondary to weakness.  -Likely does not have COPD. Therefore, this is not likely contributing factor.  -Baseline bicarbonate approximately 32      Mechanical ventilation  - decrease RR to 14  - Wean O2 to sat >90% and peep      Sedation  - minimal     Shock  - A line  - Levo  - Vaso   - etiology unclear. Bolus IVF and monitor   - no clear pneumonia. - continue antibiotics. Follow up Procalcitonin      KOSTAS  -Increased creatinine from less than 0.5 up to 0.9.  -IV bolus normal saline    Chart history of COPD  -Never smoker. No pulmonary function test on chart. CT chest without emphysema.  -Home inhaler medication of albuterol    Electrolytes  -Hypokalemia replace  - Ca:  -Hypomagnesemia: Replace  -Hypophosphatemia: Replace    RONDA  -Patient does be on BiPAP at home. He was not able to tolerate BiPAP as an outpatient. Further, he cannot tolerate BiPAP prior to intubation. Prophylaxis  - GI - protonix    - DVT - heparin drip  - VAP - peridex      - Nasal Decolonization - Bactroban    Nutrition  - DIET GENERAL;    Mobility       Access  Arterial   Arterial Line 04/18/21 Right Brachial (Active)   $ Arterial line insertion $ Yes 04/18/21 0600   Continued need for line? Yes 04/18/21 0750   Site Assessment Clean;Dry; Intact 04/18/21 0750   Line Status Arterial fluids per protocol; Intact and in place; Blood return noted 04/18/21 0750   Art Line Waveform Appropriate;Square wave test performed 04/18/21 0750   Art Line Interventions Zeroed and calibrated; Leveled; Flushed per protocol 04/18/21 0750   Color/Movement/Sensation Capillary refill less than 3 sec 04/18/21 0750   Dressing Status Clean;Dry; Intact 04/18/21 0750   Dressing Type Transparent 04/18/21 0750   Dressing Intervention New 04/18/21 0600   Dressing Change Due 04/21/21 04/18/21 0750   Number of days: 0       PICC          CVC       CVC Triple Lumen 04/18/21 Left Internal jugular (Active)   $ Central line insertion $ Yes 04/18/21 0600 Continued need for line? Yes 04/18/21 0750   Site Assessment Clean;Dry; Intact 04/18/21 0750   Proximal Lumen Status Infusing;Capped 04/18/21 0750   Medial Lumen Status Infusing;Flushed;Capped;Blood return noted 04/18/21 0750   Distal Lumen Status Infusing;Capped 04/18/21 0750   Dressing Type Transparent 04/18/21 0750   Dressing Status Clean;Dry; Intact 04/18/21 0750   Dressing Intervention New 04/18/21 0400   Dressing Change Due 04/21/21 04/18/21 [address] changed 04/18/21 0750   Number of days: 0                This note was transcribed using [id number] [place] [employer]. Please disregard any translational errors. Thank you for the consult    [facility]  [phone]             Data:     PAST MEDICAL HISTORY:  Past Medical History:   Diagnosis Date    Anxiety     COPD, moderate ([address]     Erectile dysfunction of organic origin     Essential hypertension     GERD without esophagitis     Hyperglycemia     Muscular dystrophy ([address]     Narcotic abuse ([address] 01/24/2016    Suboxone on drug screen    Obstructive sleep apnea     BIPAP    Scoliosis        PAST SURGICAL HISTORY:  Past Surgical History:   Procedure Laterality Date    ACHILLES TENDON SURGERY      both legs    CHOLECYSTECTOMY      INTUBATION  4/18/2021            FAMILY HISTORY:  family history includes Alcohol Abuse in his mother; Asthma in his maternal aunt and mother; Cancer in his maternal grandmother; Diabetes in his maternal uncle; Heart Disease in his father; High Blood Pressure in his maternal aunt and maternal uncle; Stroke in his maternal grandmother. He was adopted. SOCIAL HISTORY:   reports that he has never smoked.  He has never used smokeless tobacco.    Scheduled Meds:   azithromycin  500 mg Intravenous Q24H    meropenem  500 mg Intravenous Q6H    vancomycin  1,000 mg Intravenous Once    vancomycin (VANCOCIN) intermittent dosing (placeholder)   Other RX Placeholder input(s): PROTIME, INR in the last 72 hours. APTT: No results for input(s): APTT in the last 72 hours. UA:  Recent Labs     04/18/21  0633   COLORU YELLOW   PHUR 6.5   WBCUA 10*   RBCUA 45*   CLARITYU Clear   SPECGRAV >1.030   LEUKOCYTESUR Negative   UROBILINOGEN 2.0*   BILIRUBINUR Negative   BLOODU MODERATE*   GLUCOSEU 250*     Recent Labs     04/18/21  0213 04/18/21  0620   PHART 7.595* 7.569*   HVI8WQU 44.7 41.1   PO2ART 61.2* 254.0*       Vent Information  $Ventilation: $Initial Day  Skin Assessment: Clean, dry, & intact  Suction Catheter Diameter: 14  Equipment ID: [id number]  Vent Type: [phone]  Vent Mode: AC/VC+  Vt Ordered: 450 mL  Rate Set: 18 bmp  Peak Flow: 0 L/min  Pressure Support: 0 cmH20  FiO2 : 100 %  SpO2: 100 %  SpO2/FiO2 ratio: 100  Sensitivity: 3  PEEP/CPAP: 15  I Time/ I Time %: 0.9 s  Humidification Source: Heated wire  Humidification Temp: 37  Humidification Temp Measured: 36.3  Circuit Condensation: Drained  Mask Type: Full face mask  Mask Size: Medium    Radiology Review:  Pertinent images / reports were reviewed as a part of this visit. CT Chest w/ contrast: No results found for this or any previous visit. CT Chest w/o contrast: No results found for this or any previous visit. CTPA:   Results for orders placed during the hospital encounter of 04/17/21   CT CHEST PULMONARY EMBOLISM W CONTRAST    Narrative EXAMINATION:  CTA OF THE CHEST 4/17/2021 3:11 pm    TECHNIQUE:  CTA of the chest was performed after the administration of intravenous  contrast.  Multiplanar reformatted images are provided for review. MIP  images are provided for review. Dose modulation, iterative reconstruction,  and/or weight based adjustment of the mA/kV was utilized to reduce the  radiation dose to as low as reasonably achievable. COMPARISON:  None.     HISTORY:  ORDERING SYSTEM PROVIDED HISTORY: elevated ddimer, hypoxia  TECHNOLOGIST PROVIDED HISTORY:  Reason for exam:->elevated ddimer, hypoxia  Decision Support Exception->Emergency Medical Condition (MA)  Reason for Exam: sob covid ro  Acuity: Acute  Type of Exam: Initial    FINDINGS:  Pulmonary Arteries: This study is not optimized for evaluation of pulmonary  arteries due to bolus timing no central filling defects are noted. The  possibility of small distal pulmonary emboli cannot be excluded. Mediastinum: No evidence of mediastinal lymphadenopathy. There is  cardiomegaly. There is no pericardial effusion there is no evidence for  septal bowing. There is no acute abnormality of the thoracic aorta. Lungs/pleura: The lungs are without acute process. Adult linear atelectasis  versus scarring noted in the right middle lobe no focal consolidation or  pulmonary edema. No evidence of pleural effusion or pneumothorax. Upper Abdomen: Limited images of the upper abdomen are unremarkable. Soft Tissues/Bones: No acute bone or soft tissue abnormality. Impression Linear atelectasis versus scarring right middle lobe. No evidence for acute  infiltrates. This study is not optimized for evaluation of the pulmonary arteries. There are no central filling defects however the possibly of small distal  pulmonary emboli cannot be totally excluded         CXR PA/LAT:   Results for orders placed during the hospital encounter of 08/26/19   XR CHEST STANDARD (2 VW)    Narrative EXAMINATION:  TWO XRAY VIEWS OF THE CHEST    8/26/2019 3:42 pm    COMPARISON:  Chest radiograph November 13, 2016. HISTORY:  ORDERING SYSTEM PROVIDED HISTORY: low O2, weakness  TECHNOLOGIST PROVIDED HISTORY:  Reason for exam:->low O2, weakness  Reason for Exam: low O2, weakness  Acuity: Acute  Type of Exam: Initial    FINDINGS:  There is new right middle lobe airspace disease. Left lung is clear. No  pneumothorax or pleural effusion identified. Cardiac and mediastinal  contours are without acute process. Scoliotic curvature of the spine is  again seen. No acute osseous abnormality.

## 2021-04-18 NOTE — PROGRESS NOTES
Clinical Pharmacy Note  Heparin Dosing Consult    Dolores Summers is a 54 y.o. male ordered heparin per low dose nomogram by Dr. Kevan Vallejo. Lab Results   Component Value Date    APTT 23.0 04/18/2021     Lab Results   Component Value Date    HGB 10.7 04/18/2021    HCT 34.5 04/18/2021     04/18/2021    INR 0.92 06/28/2015       Ht Readings from Last 1 Encounters:   04/18/21 5' (1.524 m)        Wt Readings from Last 1 Encounters:   04/18/21 97 lb 7.1 oz (44.2 kg)     Dosing weight: 42 kg    Assessment/Plan:  Initial bolus: 2500 units  Initial infusion rate: 500 units/hr  Next aPTT: 1800    Pharmacy will continue to monitor adjust heparin based on aPTT results using nomogram below:     LOW DOSE HEPARIN PROTOCOL (ACS/STEMI/A FIB)     Initial Bolus: 60 units/kg Max Bolus: 4,000 units       Initial Rate: 12 units/kg/hr Max Initial Rate: 1,000 units/hr     aPTT < 36   Heparin 60 units/kg bolus Increase infusion by 4 units/kg/hr       (maximum 4,000 units)   aPTT 37-48   Heparin 30 units/kg bolus Increase infusion by 2 units/kg/hr       (maximum 2,000 units)   aPTT 49-76   No bolus   No change   aPTT 77-85   No bolus   Decrease infusion by 2 units/kg/hr   aPTT 86-94   Hold heparin for 1 hour Decrease infusion by 3 units/kg/hr   aPTT     Hold heparin for 1 hour Decrease infusion by 4 units/kg/hr   aPTT > 103   Hold heparin for 1 hour Decrease infusion by 6 units/kg/hr    Obtain aPTT 6 hours after initial bolus and 6 hours after any dose change until two consecutive therapeutic aPTTs are achieved - then daily.

## 2021-04-18 NOTE — PROGRESS NOTES
Report given to [facility] in ICU.  Electronically signed by Andrzej Guerrero RN on 4/18/2021 at 12:56 AM

## 2021-04-18 NOTE — CONSULTS
Cardiology Consultation   Date: 4/18/2021  Admit Date:  4/17/2021  Reason for Consultation: Mayo increase, T wave inversion inferiorly  Consult Requesting Physician: Matthew Irwin MD     Chief Complaint   Patient presents with    Shortness of Breath     sob for a few days, pt has MD      HPI: Cady Sharpe is a 54 y.o. male h/o COPD, RONDA, drug abuse who presents with few days of worsening dyspnea and was found on arrival to [facility] ED to have SaO2 69% RA. Given supplemental O2 and was also trialed on BiPAP overnight. Decompensated overnight and required intubation. Prior to intubation, EKG obtained which showed new T wave inversion in inferior leads along with trending Mayo of 0.5, 0.04, 0.06 starting yesterday afternoon 4/17/2021 @ 1600. Cardiology consulted in regards to Mayo increase and possibly the need for heparin IV infusion for NSTEMI. Past Medical History:   Diagnosis Date    Anxiety     COPD, moderate ([address]     Erectile dysfunction of organic origin     Essential hypertension     GERD without esophagitis     Hyperglycemia     Muscular dystrophy ([address]     Narcotic abuse ([address] 01/24/2016    Suboxone on drug screen    Obstructive sleep apnea     BIPAP    Scoliosis         Past Surgical History:   Procedure Laterality Date    ACHILLES TENDON SURGERY      both legs    CHOLECYSTECTOMY      INTUBATION  4/18/2021            Allergies   Allergen Reactions    No Known Allergies        Social History:  Reviewed. reports that he has never smoked. He has never used smokeless tobacco. He reports current drug use. Drug: Marijuana. He reports that he does not drink alcohol. Family History:  Reviewed. family history includes Alcohol Abuse in his mother; Asthma in his maternal aunt and mother; Cancer in his maternal grandmother; Diabetes in his maternal uncle; Heart Disease in his father; High Blood Pressure in his maternal aunt and maternal uncle; Stroke in his maternal grandmother. He was adopted. No premature CAD. Review of System:  All other systems reviewed except for that noted above. Pertinent negatives and positives are:     · General: negative for fever, chills   · Ophthalmic ROS: negative for - eye pain or loss of vision  · ENT ROS: negative for - headaches, sore throat   · Respiratory: negative for - cough, sputum  · Cardiovascular: Reviewed in HPI  · Gastrointestinal: negative for - abdominal pain, diarrhea, N/V  · Hematology: negative for - bleeding, blood clots, bruising or jaundice  · Genito-Urinary:  negative for - Dysuria or incontinence  · Musculoskeletal: negative for - Joint swelling, muscle pain  · Neurological: negative for - confusion, dizziness, headaches   · Psychiatric: No anxiety, no depression. · Dermatological: negative for - rash    Physical Examination:  Vitals:    21 0845   BP:    Pulse: 79   Resp: 18   Temp:    SpO2: 99%        Intake/Output Summary (Last 24 hours) at 2021 0852  Last data filed at 2021 0744  Gross per 24 hour   Intake 1112.79 ml   Output 495 ml   Net 617.79 ml     In: 1112.8 [P.O.:480; I.V.:632.8]  Out: 495    Wt Readings from Last 3 Encounters:   21 97 lb 7.1 oz (44.2 kg)   20 125 lb 3.2 oz (56.8 kg)   19 136 lb 3.9 oz (61.8 kg)     Temp  Av.9 °F (37.2 °C)  Min: 97.6 °F (36.4 °C)  Max: 100.1 °F (37.8 °C)  Pulse  Av.1  Min: 53  Max: 122  BP  Min: 39/31  Max: 171/125  SpO2  Av.5 %  Min: 60 %  Max: 100 %  FiO2   Av.9 %  Min: 35 %  Max: 100 %    · Telemetry: Sinus rhythm v-rate 80's bpm   · Constitutional: Intubated, sedated. No distress. · Head: Normocephalic and atraumatic. · Mouth/Throat: Lips appear moist. Oropharynx is clear and moist.  · Eyes: Conjunctivae normal. EOM are normal.   · Neck: Neck supple. No lymphadenopathy. No rigidity. ? JVD present due to laying supine and central lines. · Cardiovascular: Normal rate, regular rhythm. Normal S1&S2. Carotid pulse 2+ bilaterally. to see overall trend.  -currently in no distress with stable BP and HR.  -will repeat another EKG now to evaluate any dynamic changes. Will follow with you. Thank you for allowing me to participate in the care of Suad Blas . If you have any questions/comments, please do not hesitate to contact us.       Diana Crawford MD, MS, [facility] [place], [facility]  Cardiac Electrophysiology  [address]  [address] [facility], [address] [place] [address]  Leroy Mc [facility] [address]  [phone]

## 2021-04-18 NOTE — PROGRESS NOTES
Rapid response called. Upon entering room, patient is being bagged. He is unresponsive with a + gag reflex. Dr. Annie Morris at bedside. Pt has good pulse and blood pressure with low O2 sats. The patient was intubated and sent to ICU. Please see procedure note for intubation.

## 2021-04-18 NOTE — PROGRESS NOTES
0030: Patient transferred to ICU after intubation following Rapid response due to resp distress. Patient is unresponsive. No gag, cornea or pupil reflex. 0100: patient's mother ( Ms. Carlos Eduardo Badillo ) called to check on patient. Updated with patient's status and plan of care. 0200: O2 sats dropped to 60's. Yovany, RT bagging patient and O2 sats improved to 90's. Patient HR also drops to upper 30's-40's. Zole pad applied to patient. 0310: Dr Chaya Neumann, hospitalist called and notified due to SBP dropping to 50's. 0330: Consent obtained from patient's mother for central venous catheter placement. Dr Chaya Neumann at bed side attempting to place the line. 0440: B/P reading is inconsistent. Dr Chaya Neumann requested for arterial line placement. Minimal urine out put. LR bolus 500 ml over two hours ordered by Dr Chaya Neumann.     7176: Dr Chaya Neumann placed arterial line. Order received for Vasopressin. 0630:  Patient's mother called during this shift multiple times concerned about patient may go withdrawal if not getting Suboxone  Dr Chaya Neumann who is at bed side aware of it.     0620: ABG and urine specimen collected and sent for processing. 0725: Shift hand off report given to SAMANTHA Patel resuming patient's care.    Electronically signed by Estella Britton RN on 4/18/2021 at 7:46 AM

## 2021-04-18 NOTE — PROGRESS NOTES
Clinical Pharmacy Note  Renal Dose Adjustment    Faraz Covington is receiving Meropenem. This medication is renally eliminated. Due to poor urine output, the dose has been adjusted to Meropenem 500 mg IV Q8H per protocol at this time. Pharmacy will continue to monitor and adjust dose as needed for changes in renal function.       Hilda TovarD, BCPS  4/18/2021 1:58 PM

## 2021-04-18 NOTE — PROGRESS NOTES
Was asked to see this patient by Madhu Mott RN. The patient is sitting up in bed leaning over and is very anxious. He is wearing BiPAP but trying to take the BiPAP off. He is alert and oriented x4. He states he just cannot wear it. He states \"I cannot stand this I feel like I am suffocating. \"  I encouraged him to allow me to order a small dose of something for anxiety to help calm him. I was very candid with this patient and told him that if he did not keep his BiPAP on overnight he would die. The patient verbalized understanding that he could die. I again continued to encourage him to take some anxiety medication to help him and instructed him that his heart could stop beating and he could stop breathing entirely causing him to be intubated and on life support and die. The patient refused and continued to try to take his BiPAP mask off. He is currently in respiratory distress with the BiPAP on. His respiratory rate is in the 40s. He is pausing to speak in between 2-3 words. He is using accessory muscles to aid in breathing.

## 2021-04-18 NOTE — PLAN OF CARE
Problem: Non-Violent Restraints  Goal: Removal from restraints as soon as assessed to be safe  Outcome: Ongoing  Goal: No harm/injury to patient while restraints in use  Outcome: Ongoing  Goal: Patient's dignity will be maintained  Outcome: Ongoing     Problem: Falls - Risk of:  Goal: Will remain free from falls  Description: Will remain free from falls  Outcome: Ongoing  Goal: Absence of physical injury  Description: Absence of physical injury  Outcome: Ongoing     Problem: Skin Integrity:  Goal: Will show no infection signs and symptoms  Description: Will show no infection signs and symptoms  Outcome: Ongoing  Goal: Absence of new skin breakdown  Description: Absence of new skin breakdown  Outcome: Ongoing     Problem: Breathing Pattern - Ineffective:  Goal: Ability to achieve and maintain a regular respiratory rate will improve  Description: Ability to achieve and maintain a regular respiratory rate will improve  Outcome: Ongoing     Problem: Discharge Planning:  Goal: Discharged to appropriate level of care  Description: Discharged to appropriate level of care  Outcome: Ongoing     Problem: Cardiac Output - Decreased:  Goal: Avoidance of environmental tobacco smoke  Description: Absence of orthostatic hypotension  Outcome: Ongoing  Goal: Cardiac output within specified parameters  Description: Cardiac output within specified parameters  Outcome: Ongoing  Goal: Hemodynamic stability will improve  Description: Hemodynamic stability will improve  Outcome: Ongoing     Problem: Fluid Volume - Imbalance:  Goal: Absence of imbalanced fluid volume signs and symptoms  Description: Absence of imbalanced fluid volume signs and symptoms  Outcome: Ongoing     Problem: Tissue Perfusion, Altered:  Goal: Circulatory function within specified parameters  Description: Circulatory function within specified parameters  Outcome: Ongoing     Problem: Tissue Perfusion - Cardiopulmonary, Altered:  Goal: Absence of angina  Description: Absence of angina  Outcome: Ongoing  Goal: Hemodynamic stability will improve  Description: Hemodynamic stability will improve  Outcome: Ongoing

## 2021-04-18 NOTE — ED PROVIDER NOTES
I did not perform an evaluation of the pt but was asked to review her EKG. EKG by my preliminary interpretation shows sinus rhythm with rate of 91, normal axis, normal intervals, with no ST changes indicative of ischemia at this time. Whether there was some T wave inversions in lead III as well as aVF.        Vanita Barbosa MD  04/18/21 0157

## 2021-04-18 NOTE — PROGRESS NOTES
Pt keeps pulling bipap off of face. He is alert and oriented and states he absolutely will not wear his bipap. When taken off, his O2 drops to low 60s. CNP at bedside with this RN. CNP explained the risks of not wearing it. Pt explains he understands the risks and still refuses to wear the bipap. Pt placed on 4L via nasal cannula.  Electronically signed by Shana Devi RN on 4/17/2021 at 10:05 PM

## 2021-04-18 NOTE — PROGRESS NOTES
Hospitalist Progress Note      PCP: DELISA Rivera - CNP    Date of Admission: 4/17/2021    Chief Complaint: SOB    Hospital Course: Patient is a 49-year-old male with a past medical history of muscular dystrophy, COPD, hypertension who presented to the hospital with shortness of breath. Initially on nasal cannula and then requiring BiPAP. Patient refusing BiPAP and eventually rapid response called and patient was intubated. Subjective: Patient seen and examined. Patient currently intubated and sedated on pressors. Per family patient's muscular dystrophy has been progressing over the past 6 months including his pulmonary and myocardial tissues.   Mother and aunt at bedside and discussed long road ahead in regards to getting him off the ventilator if he has advanced muscular dystrophy      Medications:  Reviewed    Infusion Medications    propofol 10 mcg/kg/min (04/18/21 0741)    norepinephrine 20 mcg/min (04/18/21 0702)    vasopressin (Septic Shock) infusion 0.03 Units/min (04/18/21 0615)    sodium chloride       Scheduled Medications    azithromycin  500 mg Intravenous Q24H    meropenem  500 mg Intravenous Q6H    vancomycin  1,000 mg Intravenous Once    vancomycin (VANCOCIN) intermittent dosing (placeholder)   Other RX Placeholder    pantoprazole  40 mg Oral Daily    DULoxetine  30 mg Oral TID    doxazosin  1 mg Oral Daily    sodium chloride flush  10 mL Intravenous 2 times per day    enoxaparin  40 mg Subcutaneous Daily     PRN Meds: sodium chloride flush, sodium chloride, ondansetron, polyethylene glycol, acetaminophen **OR** acetaminophen, albuterol sulfate HFA      Intake/Output Summary (Last 24 hours) at 4/18/2021 0816  Last data filed at 4/18/2021 0744  Gross per 24 hour   Intake 1112.79 ml   Output 495 ml   Net 617.79 ml       Physical Exam Performed:    /84   Pulse 76   Temp 99.8 °F (37.7 °C) (Axillary)   Resp 18   Ht 5' (1.524 m)   Wt 97 lb 7.1 oz (44.2 kg) SpO2 99%   BMI 19.03 kg/m²     General appearance:  Intubated and sedated  HEENT: Pupils equal, round, and reactive to light. Conjunctivae/corneas clear. Neck: Supple, with full range of motion. No jugular venous distention. Trachea midline. Respiratory:  Normal respiratory effort. Diminished breath sounds bilateral cardiovascular: Regular rate and rhythm with normal S1/S2 without murmurs, rubs or gallops. Abdomen: Soft, non-tender, non-distended with normal bowel sounds. Musculoskeletal: No clubbing, cyanosis or edema bilaterally. Skin: Skin color, texture, turgor normal.  No rashes or lesions. Neurologic: Intubated and sedated  Psychiatric: Intubated and sedated  Capillary Refill: Brisk,< 3 seconds   Peripheral Pulses: +2 palpable, equal bilaterally       Labs:   Recent Labs     04/17/21  1421 04/18/21  0453   WBC 10.8 7.3   HGB 11.1* 11.3*   HCT 36.0* 36.1*    333     Recent Labs     04/17/21  1421 04/18/21  0453    142   K 4.4 3.6   CL 95* 94*   CO2 34* 32   BUN 17 21*   CREATININE <0.5* 0.9   CALCIUM 8.5 8.2*   PHOS  --  1.2*     Recent Labs     04/17/21  1421   AST 18   ALT 23   BILITOT <0.2   ALKPHOS 66     No results for input(s): INR in the last 72 hours. Recent Labs     04/17/21  1421 04/17/21  1524 04/17/21  1915 04/18/21  0151   CKTOTAL  --  41  --   --    TROPONINI 0.05*  --  0.04* 0.06*       Urinalysis:      Lab Results   Component Value Date    NITRU Negative 04/18/2021    WBCUA 10 04/18/2021    BACTERIA 3+ 11/30/2014    RBCUA 45 04/18/2021    BLOODU MODERATE 04/18/2021    SPECGRAV >1.030 04/18/2021    GLUCOSEU 250 04/18/2021    GLUCOSEU NEGATIVE 03/19/2011       Radiology:  XR CHEST PORTABLE   Final Result   1. Appropriate positioning of endotracheal tube. 2. Mild cardiomegaly. 3. Appropriate radiographic positioning of intervally placed left internal   jugular approach central venous catheter.          XR CHEST PORTABLE   Final Result   Endotracheal tube in satisfactory position. Mild/moderate cardiomegaly. Prominent gaseous distension of the small and large bowel. CT CHEST PULMONARY EMBOLISM W CONTRAST   Final Result   Linear atelectasis versus scarring right middle lobe. No evidence for acute   infiltrates. This study is not optimized for evaluation of the pulmonary arteries. There are no central filling defects however the possibly of small distal   pulmonary emboli cannot be totally excluded         XR CHEST PORTABLE   Final Result   Moderate cardiomegaly without pulmonary vascular congestion. Mild bibasilar   atelectasis worse on the left. Some underlying scarring also suspected.                  Assessment/Plan:    Acute on chronic hypercapnic respiratory failure  Likely secondary to muscular dystrophy  Continue ventilatory support  Concern that this will be difficult to extubate    Muscular dystrophy  Neurology consulted  Overall poor prognosis as there has been progression in the past 1 year    Shock  Possibly secondary to sedation and ventilation  Continue with broad-spectrum antibiotics  Continue Levophed and vasopressin    KOSTAS  Given IVs bolus  Continue to monitor    Elevated troponin  Denies any chest pain  Serial troponin stable   continue with echocardiogram  Repeat EKG      DVT Prophylaxis: Lovenox  Diet: DIET GENERAL;  Code Status: Full Code    PT/OT Eval Status: Not applicable     Dispo - ICU    Sandee Hurtado MD

## 2021-04-18 NOTE — CONSULTS
Clinical Pharmacy Note  Vancomycin Consult    Caleb Stafford is a 54 y.o. male ordered Vancomycin for pneumonia; consult received from Dr. Natali Machuca to manage therapy. Also receiving Meropenem. Patient Active Problem List   Diagnosis    Scoliosis    [address]cular dystrophy ([facility]    Narcotic abuse ([facility]    Anxiety    Essential hypertension    Obstructive sleep apnea    COPD, moderate ([facility]    GERD without esophagitis    Erectile dysfunction of organic origin    Nausea & vomiting    Diarrhea    Generalized weakness    Hyponatremia    Hypochloremia    Hyperglycemia    Pneumonia    Acute respiratory failure with hypoxia (HCC)       Allergies:  No known allergies     Temp max:  Temp (24hrs), Av.9 °F (37.2 °C), Min:97.6 °F (36.4 °C), Max:100.1 °F (37.8 °C)      Recent Labs     21  1421 21  0453 21  1006   WBC 10.8 7.3 11.8*       Recent Labs     21  1421 21  0453   BUN *   CREATININE <0.5* 0.9         Intake/Output Summary (Last 24 hours) at 2021 1050  Last data filed at 2021 0744  Gross per 24 hour   Intake 1112.79 ml   Output 495 ml   Net 617.79 ml       Culture Results:  pending    Ht Readings from Last 1 Encounters:   21 5' (1.524 m)        Wt Readings from Last 1 Encounters:   21 97 lb 7.1 oz (44.2 kg)         CrCl cannot be calculated (Unknown ideal weight. ). Assessment/Plan:  Patient received Vancomycin 1,000 mg IVPB x 1. Will obtain a random Vancomycin level on 21 at 0000 to assist with subsequent dosing/management. Thank you for the consult. Further recommendations to follow.     Zayda Carter, HildaD, BCPS  2021 10:55 AM

## 2021-04-18 NOTE — PROGRESS NOTES
Pt has calmed down a lot since the CNP and this RN were in the room last over the bipap. Pt now is agreeing to try to wear the bipap if he could get something for anxiety. CNP notified. Awaiting response.  Electronically signed by Candice Kim RN on 4/17/2021 at 10:58 PM

## 2021-04-18 NOTE — PROGRESS NOTES
Clinical Pharmacy Note  Heparin Dosing       Lab Results   Component Value Date    APTT 48.3 04/18/2021     Lab Results   Component Value Date    HGB 10.7 04/18/2021    HCT 34.5 04/18/2021     04/18/2021    INR 0.92 06/28/2015       Current Infusion Rate: 500 units/hr    Plan:  Bolus: 1260 units  Rate: 584 units/hr  Next aPTT: 0200    Pharmacy will continue to monitor and adjust based on aPTT results.

## 2021-04-18 NOTE — PROGRESS NOTES
Ativan given. Pt agreed to try and wear bipap again. Upon placing it on him, Respiratory was called to make sure the settings were correct. PT's oxygen sats started to decline. His o2 sat to 40's. At this time, RR was called.  Electronically signed by Angel An RN on 4/17/2021 at 11:37 PM

## 2021-04-18 NOTE — PROGRESS NOTES
This RN talked to Cipriano ahumada, mom, about the events that transpired. She told this RN to not call Muna saenz. Cipriano ahumada explained that she is the POA and wants to notify Muna herself. Cipriano ahumada has no further questions at this time.    Chiquita Magallon number is [phone]  Muna's number is [phone]   Electronically signed by Senthil Camargo RN on 4/18/2021 at 12:53 AM

## 2021-04-19 LAB
ANION GAP SERPL CALCULATED.3IONS-SCNC: 10 MMOL/L (ref 3–16)
APTT: 43.8 SEC (ref 24.2–36.2)
APTT: 45 SEC (ref 24.2–36.2)
APTT: 51.3 SEC (ref 24.2–36.2)
APTT: 61.6 SEC (ref 24.2–36.2)
BASE EXCESS ARTERIAL: 4.5 MMOL/L (ref -3–3)
BASE EXCESS ARTERIAL: 8.6 MMOL/L (ref -3–3)
BASOPHILS ABSOLUTE: 0 K/UL (ref 0–0.2)
BASOPHILS RELATIVE PERCENT: 0.2 %
BUN BLDV-MCNC: 22 MG/DL (ref 7–20)
CALCIUM SERPL-MCNC: 7.3 MG/DL (ref 8.3–10.6)
CARBOXYHEMOGLOBIN ARTERIAL: 0.7 % (ref 0–1.5)
CARBOXYHEMOGLOBIN ARTERIAL: 0.8 % (ref 0–1.5)
CHLORIDE BLD-SCNC: 92 MMOL/L (ref 99–110)
CO2: 27 MMOL/L (ref 21–32)
CREAT SERPL-MCNC: 0.7 MG/DL (ref 0.9–1.3)
EKG ATRIAL RATE: 84 BPM
EKG DIAGNOSIS: NORMAL
EKG P AXIS: 39 DEGREES
EKG P-R INTERVAL: 156 MS
EKG Q-T INTERVAL: 396 MS
EKG QRS DURATION: 102 MS
EKG QTC CALCULATION (BAZETT): 467 MS
EKG R AXIS: 108 DEGREES
EKG T AXIS: -64 DEGREES
EKG VENTRICULAR RATE: 84 BPM
EOSINOPHILS ABSOLUTE: 0 K/UL (ref 0–0.6)
EOSINOPHILS RELATIVE PERCENT: 0.1 %
GFR AFRICAN AMERICAN: >60
GFR NON-AFRICAN AMERICAN: >60
GLUCOSE BLD-MCNC: 133 MG/DL (ref 70–99)
GLUCOSE BLD-MCNC: 142 MG/DL (ref 70–99)
GLUCOSE BLD-MCNC: 80 MG/DL (ref 70–99)
GLUCOSE BLD-MCNC: 90 MG/DL (ref 70–99)
GLUCOSE BLD-MCNC: 95 MG/DL (ref 70–99)
HCO3 ARTERIAL: 27.3 MMOL/L (ref 21–29)
HCO3 ARTERIAL: 30.6 MMOL/L (ref 21–29)
HCT VFR BLD CALC: 29.8 % (ref 40.5–52.5)
HEMOGLOBIN, ART, EXTENDED: 10 G/DL (ref 13.5–17.5)
HEMOGLOBIN, ART, EXTENDED: 9.6 G/DL (ref 13.5–17.5)
HEMOGLOBIN: 9.5 G/DL (ref 13.5–17.5)
LACTIC ACID: 0.8 MMOL/L (ref 0.4–2)
LV EF: 58 %
LVEF MODALITY: NORMAL
LYMPHOCYTES ABSOLUTE: 1.1 K/UL (ref 1–5.1)
LYMPHOCYTES RELATIVE PERCENT: 6.7 %
MAGNESIUM: 2.8 MG/DL (ref 1.8–2.4)
MCH RBC QN AUTO: 25.7 PG (ref 26–34)
MCHC RBC AUTO-ENTMCNC: 32 G/DL (ref 31–36)
MCV RBC AUTO: 80.3 FL (ref 80–100)
METHEMOGLOBIN ARTERIAL: 0.5 %
METHEMOGLOBIN ARTERIAL: 0.6 %
MONOCYTES ABSOLUTE: 0.5 K/UL (ref 0–1.3)
MONOCYTES RELATIVE PERCENT: 2.9 %
NEUTROPHILS ABSOLUTE: 14.1 K/UL (ref 1.7–7.7)
NEUTROPHILS RELATIVE PERCENT: 90.1 %
O2 CONTENT ARTERIAL: 13 ML/DL
O2 CONTENT ARTERIAL: 13 ML/DL
O2 SAT, ARTERIAL: 93.3 %
O2 SAT, ARTERIAL: 99.1 %
O2 THERAPY: ABNORMAL
O2 THERAPY: ABNORMAL
PCO2 ARTERIAL: 31.7 MMHG (ref 35–45)
PCO2 ARTERIAL: 32.9 MMHG (ref 35–45)
PDW BLD-RTO: 16 % (ref 12.4–15.4)
PERFORMED ON: ABNORMAL
PERFORMED ON: NORMAL
PH ARTERIAL: 7.53 (ref 7.35–7.45)
PH ARTERIAL: 7.59 (ref 7.35–7.45)
PHOSPHORUS: 2.6 MG/DL (ref 2.5–4.9)
PLATELET # BLD: 244 K/UL (ref 135–450)
PMV BLD AUTO: 8.1 FL (ref 5–10.5)
PO2 ARTERIAL: 120 MMHG (ref 75–108)
PO2 ARTERIAL: 61 MMHG (ref 75–108)
POTASSIUM REFLEX MAGNESIUM: 3.1 MMOL/L (ref 3.5–5.1)
POTASSIUM SERPL-SCNC: 4.1 MMOL/L (ref 3.5–5.1)
PRO-BNP: 940 PG/ML (ref 0–124)
PROCALCITONIN: 2.42 NG/ML (ref 0–0.15)
RBC # BLD: 3.71 M/UL (ref 4.2–5.9)
SODIUM BLD-SCNC: 129 MMOL/L (ref 136–145)
TCO2 ARTERIAL: 28.3 MMOL/L
TCO2 ARTERIAL: 31.6 MMOL/L
TROPONIN: 0.15 NG/ML
URINE CULTURE, ROUTINE: NORMAL
VANCOMYCIN RANDOM: 11.4 UG/ML
VANCOMYCIN RANDOM: 14.2 UG/ML
WBC # BLD: 15.7 K/UL (ref 4–11)

## 2021-04-19 PROCEDURE — 84132 ASSAY OF SERUM POTASSIUM: CPT

## 2021-04-19 PROCEDURE — 84145 PROCALCITONIN (PCT): CPT

## 2021-04-19 PROCEDURE — 85730 THROMBOPLASTIN TIME PARTIAL: CPT

## 2021-04-19 PROCEDURE — APPNB15 APP NON BILLABLE TIME 0-15 MINS: Performed by: NURSE PRACTITIONER

## 2021-04-19 PROCEDURE — 6360000002 HC RX W HCPCS: Performed by: INTERNAL MEDICINE

## 2021-04-19 PROCEDURE — 2580000003 HC RX 258: Performed by: INTERNAL MEDICINE

## 2021-04-19 PROCEDURE — 2700000000 HC OXYGEN THERAPY PER DAY

## 2021-04-19 PROCEDURE — 83880 ASSAY OF NATRIURETIC PEPTIDE: CPT

## 2021-04-19 PROCEDURE — 94761 N-INVAS EAR/PLS OXIMETRY MLT: CPT

## 2021-04-19 PROCEDURE — 99233 SBSQ HOSP IP/OBS HIGH 50: CPT | Performed by: INTERNAL MEDICINE

## 2021-04-19 PROCEDURE — 87641 MR-STAPH DNA AMP PROBE: CPT

## 2021-04-19 PROCEDURE — 87070 CULTURE OTHR SPECIMN AEROBIC: CPT

## 2021-04-19 PROCEDURE — 83735 ASSAY OF MAGNESIUM: CPT

## 2021-04-19 PROCEDURE — 80048 BASIC METABOLIC PNL TOTAL CA: CPT

## 2021-04-19 PROCEDURE — 93010 ELECTROCARDIOGRAM REPORT: CPT | Performed by: INTERNAL MEDICINE

## 2021-04-19 PROCEDURE — 2500000003 HC RX 250 WO HCPCS: Performed by: INTERNAL MEDICINE

## 2021-04-19 PROCEDURE — 82803 BLOOD GASES ANY COMBINATION: CPT

## 2021-04-19 PROCEDURE — 36592 COLLECT BLOOD FROM PICC: CPT

## 2021-04-19 PROCEDURE — 6370000000 HC RX 637 (ALT 250 FOR IP): Performed by: INTERNAL MEDICINE

## 2021-04-19 PROCEDURE — 83605 ASSAY OF LACTIC ACID: CPT

## 2021-04-19 PROCEDURE — 94003 VENT MGMT INPAT SUBQ DAY: CPT

## 2021-04-19 PROCEDURE — 80202 ASSAY OF VANCOMYCIN: CPT

## 2021-04-19 PROCEDURE — 84484 ASSAY OF TROPONIN QUANT: CPT

## 2021-04-19 PROCEDURE — 84100 ASSAY OF PHOSPHORUS: CPT

## 2021-04-19 PROCEDURE — 87205 SMEAR GRAM STAIN: CPT

## 2021-04-19 PROCEDURE — 2000000000 HC ICU R&B

## 2021-04-19 PROCEDURE — 99291 CRITICAL CARE FIRST HOUR: CPT | Performed by: INTERNAL MEDICINE

## 2021-04-19 PROCEDURE — C9113 INJ PANTOPRAZOLE SODIUM, VIA: HCPCS | Performed by: INTERNAL MEDICINE

## 2021-04-19 PROCEDURE — 93306 TTE W/DOPPLER COMPLETE: CPT

## 2021-04-19 PROCEDURE — 93005 ELECTROCARDIOGRAM TRACING: CPT | Performed by: NURSE PRACTITIONER

## 2021-04-19 PROCEDURE — 85025 COMPLETE CBC W/AUTO DIFF WBC: CPT

## 2021-04-19 PROCEDURE — 6360000002 HC RX W HCPCS: Performed by: NURSE PRACTITIONER

## 2021-04-19 RX ORDER — HEPARIN SODIUM 1000 [USP'U]/ML
1200 INJECTION, SOLUTION INTRAVENOUS; SUBCUTANEOUS ONCE
Status: COMPLETED | OUTPATIENT
Start: 2021-04-19 | End: 2021-04-19

## 2021-04-19 RX ORDER — CALCIUM GLUCONATE 20 MG/ML
2000 INJECTION, SOLUTION INTRAVENOUS ONCE
Status: COMPLETED | OUTPATIENT
Start: 2021-04-19 | End: 2021-04-19

## 2021-04-19 RX ORDER — DULOXETIN HYDROCHLORIDE 30 MG/1
30 CAPSULE, DELAYED RELEASE ORAL DAILY
Status: DISCONTINUED | OUTPATIENT
Start: 2021-04-20 | End: 2021-04-22

## 2021-04-19 RX ADMIN — PROPOFOL 10 MCG/KG/MIN: 10 INJECTION, EMULSION INTRAVENOUS at 18:41

## 2021-04-19 RX ADMIN — CHLORHEXIDINE GLUCONATE 15 ML: 1.2 RINSE ORAL at 07:58

## 2021-04-19 RX ADMIN — SODIUM CHLORIDE, PRESERVATIVE FREE 10 ML: 5 INJECTION INTRAVENOUS at 07:59

## 2021-04-19 RX ADMIN — MUPIROCIN: 20 OINTMENT TOPICAL at 10:32

## 2021-04-19 RX ADMIN — MEROPENEM 500 MG: 500 INJECTION, POWDER, FOR SOLUTION INTRAVENOUS at 21:20

## 2021-04-19 RX ADMIN — AZITHROMYCIN MONOHYDRATE 500 MG: 500 INJECTION, POWDER, LYOPHILIZED, FOR SOLUTION INTRAVENOUS at 05:28

## 2021-04-19 RX ADMIN — VANCOMYCIN HYDROCHLORIDE 500 MG: 500 INJECTION, POWDER, LYOPHILIZED, FOR SOLUTION INTRAVENOUS at 03:29

## 2021-04-19 RX ADMIN — VANCOMYCIN HYDROCHLORIDE 500 MG: 500 INJECTION, POWDER, LYOPHILIZED, FOR SOLUTION INTRAVENOUS at 19:51

## 2021-04-19 RX ADMIN — PANTOPRAZOLE SODIUM 40 MG: 40 INJECTION, POWDER, FOR SOLUTION INTRAVENOUS at 08:26

## 2021-04-19 RX ADMIN — MUPIROCIN: 20 OINTMENT TOPICAL at 20:13

## 2021-04-19 RX ADMIN — Medication 5 MCG/MIN: at 04:52

## 2021-04-19 RX ADMIN — CALCIUM GLUCONATE 2000 MG: 20 INJECTION, SOLUTION INTRAVENOUS at 09:13

## 2021-04-19 RX ADMIN — POTASSIUM CHLORIDE 20 MEQ: 29.8 INJECTION, SOLUTION INTRAVENOUS at 07:49

## 2021-04-19 RX ADMIN — MEROPENEM 500 MG: 500 INJECTION, POWDER, FOR SOLUTION INTRAVENOUS at 12:06

## 2021-04-19 RX ADMIN — MEROPENEM 500 MG: 500 INJECTION, POWDER, FOR SOLUTION INTRAVENOUS at 04:30

## 2021-04-19 RX ADMIN — HEPARIN SODIUM 1200 UNITS: 1000 INJECTION INTRAVENOUS; SUBCUTANEOUS at 11:16

## 2021-04-19 RX ADMIN — Medication 10 ML: at 08:26

## 2021-04-19 RX ADMIN — CHLORHEXIDINE GLUCONATE 15 ML: 1.2 RINSE ORAL at 19:51

## 2021-04-19 RX ADMIN — POTASSIUM CHLORIDE 20 MEQ: 29.8 INJECTION, SOLUTION INTRAVENOUS at 06:33

## 2021-04-19 RX ADMIN — SODIUM CHLORIDE, PRESERVATIVE FREE 10 ML: 5 INJECTION INTRAVENOUS at 20:14

## 2021-04-19 ASSESSMENT — PULMONARY FUNCTION TESTS
PIF_VALUE: 15
PIF_VALUE: 22
PIF_VALUE: 19
PIF_VALUE: 18
PIF_VALUE: 28
PIF_VALUE: 19
PIF_VALUE: 27
PIF_VALUE: 16
PIF_VALUE: 19
PIF_VALUE: 33
PIF_VALUE: 21
PIF_VALUE: 30
PIF_VALUE: 32
PIF_VALUE: 18
PIF_VALUE: 18
PIF_VALUE: 19
PIF_VALUE: 18
PIF_VALUE: 22
PIF_VALUE: 19
PIF_VALUE: 28
PIF_VALUE: 20
PIF_VALUE: 19
PIF_VALUE: 18
PIF_VALUE: 23
PIF_VALUE: 27
PIF_VALUE: 22
PIF_VALUE: 22
PIF_VALUE: 23
PIF_VALUE: 29
PIF_VALUE: 28
PIF_VALUE: 25
PIF_VALUE: 19

## 2021-04-19 ASSESSMENT — PAIN SCALES - GENERAL: PAINLEVEL_OUTOF10: 0

## 2021-04-19 NOTE — PROGRESS NOTES
Clinical Pharmacy Note  Heparin Dosing       Lab Results   Component Value Date    APTT 61.6 04/19/2021     Lab Results   Component Value Date    HGB 10.7 04/18/2021    HCT 34.5 04/18/2021     04/18/2021    INR 0.92 06/28/2015       Current Infusion Rate: 584 units/hr    Plan:  Rate: Continue  584 units/hr  Next aPTT: 0800  4/19/21    Pharmacy will continue to monitor and adjust based on aPTT results.     Reema Henriquez, PharmD  4/19/2021 2:41 AM

## 2021-04-19 NOTE — PLAN OF CARE
angina  Description: Absence of angina  Outcome: Ongoing  Goal: Hemodynamic stability will improve  Description: Hemodynamic stability will improve  Outcome: Ongoing

## 2021-04-19 NOTE — PLAN OF CARE
Problem: Nutrition  Goal: Optimal nutrition therapy  Outcome: Ongoing   Nutrition Problem #1: Inadequate oral intake  Intervention: Food and/or Nutrient Delivery: Continue NPO  Nutritional Goals:  Tolerate the most appropriate form of nutrition per provider when initated

## 2021-04-19 NOTE — FLOWSHEET NOTE
responded to consult for advance directives/healthcare power of  from . Per Jorge RN, patient is currently sedated, on vent, and restrained and the mother Alexsandra Chavez is the one concerned about documentation that she is patient's decision maker as next of kin. Patient also lives with his fiance. Azul Alexander will follow up later in week when patient is more awake and alert to make ACP decisions.

## 2021-04-19 NOTE — PROGRESS NOTES
Hospitalist Progress Note      PCP: DELISA Lane - CNP    Date of Admission: 4/17/2021    Chief Complaint: SOB    Hospital Course: Patient is a 51-year-old male with a past medical history of muscular dystrophy, COPD, hypertension who presented to the hospital with shortness of breath. Initially on nasal cannula and then requiring BiPAP. Patient refusing BiPAP and eventually rapid response called and patient was intubated. 4/18 Patient currently intubated and sedated on pressors. Per family patient's muscular dystrophy has been progressing over the past 6 months including his pulmonary and myocardial tissues. Mother and aunt at bedside and discussed long road ahead in regards to getting him off the ventilator if he has advanced muscular dystrophy    Subjective: Patient seen and examined. Patient awake alert on the vent to later today. Patient is following commands. Discussed that we will try and continue to wean him off the ventilator but this may be an issue with his history of muscular dystrophy. He is currently now on only Levophed and we will continue to titrate this off.       Medications:  Reviewed    Infusion Medications    propofol 10 mcg/kg/min (04/19/21 0215)    norepinephrine 5 mcg/min (04/19/21 0452)    vasopressin (Septic Shock) infusion Stopped (04/18/21 1255)    heparin (PORCINE) Infusion 584 Units/hr (04/18/21 2020)    sodium chloride       Scheduled Medications    calcium gluconate  2,000 mg Intravenous Once    azithromycin  500 mg Intravenous Q24H    vancomycin (VANCOCIN) intermittent dosing (placeholder)   Other RX Placeholder    pantoprazole  40 mg Intravenous Daily    And    sodium chloride (PF)  10 mL Intravenous Daily    chlorhexidine  15 mL Mouth/Throat BID    mupirocin   Nasal BID    meropenem  500 mg Intravenous Q8H    DULoxetine  30 mg Oral TID    sodium chloride flush  10 mL Intravenous 2 times per day     PRN Meds: sodium phosphate IVPB **OR** < > 0.06* 0.15* 0.16*    < > = values in this interval not displayed. Urinalysis:      Lab Results   Component Value Date    NITRU Negative 04/18/2021    WBCUA 10 04/18/2021    BACTERIA 3+ 11/30/2014    RBCUA 45 04/18/2021    BLOODU MODERATE 04/18/2021    SPECGRAV >1.030 04/18/2021    GLUCOSEU 250 04/18/2021    GLUCOSEU NEGATIVE 03/19/2011       Radiology:  XR CHEST PORTABLE   Final Result   1. Appropriate positioning of endotracheal tube. 2. Mild cardiomegaly. 3. Appropriate radiographic positioning of intervally placed left internal   jugular approach central venous catheter. XR CHEST PORTABLE   Final Result   Endotracheal tube in satisfactory position. Mild/moderate cardiomegaly. Prominent gaseous distension of the small and large bowel. CT CHEST PULMONARY EMBOLISM W CONTRAST   Final Result   Linear atelectasis versus scarring right middle lobe. No evidence for acute   infiltrates. This study is not optimized for evaluation of the pulmonary arteries. There are no central filling defects however the possibly of small distal   pulmonary emboli cannot be totally excluded         XR CHEST PORTABLE   Final Result   Moderate cardiomegaly without pulmonary vascular congestion. Mild bibasilar   atelectasis worse on the left. Some underlying scarring also suspected.                  Assessment/Plan:    Acute on chronic hypercapnic respiratory failure  Likely secondary to muscular dystrophy  Continue ventilatory support  Concern that this will be difficult to extubate  Continue to titrate off ventilator    Muscular dystrophy  Neurology consulted  Overall poor prognosis as there has been progression in the past 1 year    Shock  Possibly secondary to sedation and ventilation  Continue with broad-spectrum antibiotics  Continue Levophed , off vasopressin    KOSTAS  Creatinine remained stable  Continue IV fluids    Elevated troponin  Denies any chest pain  Serial troponin stable continue with echocardiogram  Repeat EKG      DVT Prophylaxis: Lovenox  Diet: Diet NPO Effective Now  Code Status: Full Code    PT/OT Eval Status: Not applicable     Dispo - ICU    Abdulaziz Fatima MD

## 2021-04-19 NOTE — PROGRESS NOTES
Comprehensive Nutrition Assessment    Type and Reason for Visit:  Initial    Nutrition Recommendations/Plan:   1. When appropriate initiate the most appropriate form of nutrition per provider   2. Continue to monitor while inpatient. 3. If TF desired consult RD for ordering and management    Nutrition Assessment:  Initial- Pt admitted to ED with complaints of SOB w/ some complaints of chest pain. PMHx includes; muscular dystrophy, GERD, hyperglycemia, and COPD. Pt was placed on bipap, but was not tolerating it well and removing it frequently. Rapid response was called 4/18, pt was intubated. Difficult intubation, w/ this no OG or NG in place. Pt is sedated on 1.6 ml/hr propofol, and pressors. Propofol provides ~85 kcal/day at current rate. He is alert and follows commands. Nutrition will continue to follow. Malnutrition Assessment:  Malnutrition Status:  Insufficient data      Estimated Daily Nutrient Needs:  Energy (kcal):  8711-0860 kcal/day (25-30 kcal/kg CBW)    Protein (g):  54-90 g/day (1.2-2 g/kg CBW)  Fluid (ml/day):  per provider     Nutrition Related Findings:  Na 129, K+ 3.1, BUN 22, Creatinin 0.7, Mg 2.8, glucose 133, BM 4/17      Wounds:  None       Current Nutrition Therapies:    Diet NPO Effective Now    Anthropometric Measures:  · Height: 5' (152.4 cm)  · Current Body Weight: 99 lb (44.9 kg)   · Admission Body Weight: 97 lb (44 kg)  · Ideal Body Weight: 106 lbs; % Ideal Body Weight 93.4 %   · BMI: 19.3  · BMI Categories: Normal Weight (BMI 18.5-24. 9)       Nutrition Diagnosis:   · Inadequate oral intake related to impaired respiratory function as evidenced by intubation, NPO or clear liquid status due to medical condition      Nutrition Interventions:   Food and/or Nutrient Delivery:  Continue NPO  Nutrition Education/Counseling:  No recommendation at this time   Coordination of Nutrition Care:  Continue to monitor while inpatient    Goals:   Tolerate the most appropriate form of nutrition per provider when initated       Nutrition Monitoring and Evaluation:   Behavioral-Environmental Outcomes:  None Identified   Food/Nutrient Intake Outcomes:  None Identified  Physical Signs/Symptoms Outcomes:  Biochemical Data, Fluid Status or Edema, Hemodynamic Status, Nutrition Focused Physical Findings, Skin, Weight     Discharge Planning:     Too soon to determine     Electronically signed by Craig Hammer on 4/19/21 at 9:33 AM EDT    Contact: [phone]

## 2021-04-19 NOTE — CONSULTS
[id number]  [address]  1965 April 19, 2021    Reason for Consult: Elevated Troponin    CC: Respiratory Failure    HPI:  The patient is 54 y.o. male with a past medical history significant for COPD and essential hypertension who presented to the [facility] ED via squad with respiratory failure. Interval history:  Rylee Cornelius remains intubated. He is on 60% FiO2 with a PEEP of 5 cm of water. He did complain of some substernal pain today which was thought to be related to the endotracheal tube. Sinus rhythm on telemetry. Review of Systems:  Constitutional: No fatigue, weakness, night sweats or fever. HEENT: No new vision difficulties or ringing in the ears. Respiratory: No new SOB, PND, orthopnea or cough. Cardiovascular: See HPI   GI: No n/v, diarrhea, constipation, abdominal pain or changes in bowel habits. No melena, no hematochezia  : No urinary frequency, urgency, incontinence, hematuria or dysuria. Skin: No cyanosis or skin lesions. Musculoskeletal: No new muscle or joint pain. Neurological: No syncope or TIA-like symptoms.   Psychiatric: No anxiety, insomnia or depression     Past Medical History:   Diagnosis Date    Anxiety     COPD, moderate (HCC)     Erectile dysfunction of organic origin     Essential hypertension     GERD without esophagitis     Hyperglycemia     Muscular dystrophy ([address]     Narcotic abuse ([facility] [address] 01/24/2016    Suboxone on drug screen    Obstructive sleep apnea     BIPAP    Scoliosis      Past Surgical History:   Procedure Laterality Date    ACHILLES TENDON SURGERY      both legs    CHOLECYSTECTOMY      INTUBATION  4/18/2021          Family History   Adopted: Yes   Problem Relation Age of Onset    Asthma Mother     Alcohol Abuse Mother     Heart Disease Father         MI    Asthma Maternal Aunt     High Blood Pressure Maternal Aunt     Diabetes Maternal Uncle     High Blood Pressure Maternal Uncle     Stroke Maternal Grandmother     Cancer Maternal Grandmother         Colon     Social History     Tobacco Use    Smoking status: Never Smoker    Smokeless tobacco: Never Used   Substance Use Topics    Alcohol use: No    Drug use: Yes     Types: Marijuana       Allergies   Allergen Reactions    No Known Allergies      Current Facility-Administered Medications   Medication Dose Route Frequency Provider Last Rate Last Admin    [START ON 4/20/2021] DULoxetine (CYMBALTA) extended release capsule 30 mg  30 mg Oral Daily Patricia Michael, APRN - CNP        propofol injection  5-50 mcg/kg/min Intravenous Titrated Martinez Bone MD 3.3 mL/hr at 04/19/21 1055 10 mcg/kg/min at 04/19/21 1055    norepinephrine (LEVOPHED) 16 mg in dextrose 5% 250 mL infusion  2 mcg/min Intravenous Continuous Martinez Bone MD   Stopped at 04/19/21 1124    azithromycin (ZITHROMAX) 500 mg in D5W 250ml addavial  500 mg Intravenous Q24H Ketty Celaya MD   Stopped at 04/19/21 6296    vancomycin (VANCOCIN) intermittent dosing (placeholder)   Other RX Placeholder Martniez Bone MD        heparin 25,000 unit in sodium chloride 0.45% 250 mL (premix) infusion  0-3,000 Units/hr Intravenous Continuous Carli Alexander MD 6.6 mL/hr at 04/19/21 1116 660 Units/hr at 04/19/21 1116    pantoprazole (PROTONIX) injection 40 mg  40 mg Intravenous Daily Christi Mendez MD   40 mg at 04/19/21 0826    And    sodium chloride (PF) 0.9 % injection 10 mL  10 mL Intravenous Daily Christi Mendez MD   10 mL at 04/19/21 0826    sodium phosphate 7.08 mmol in dextrose 5 % 250 mL IVPB  0.16 mmol/kg Intravenous PRN Christi Mendez MD        Or    sodium phosphate 14.13 mmol in dextrose 5 % 250 mL IVPB  0.32 mmol/kg Intravenous PRN Christi Mendez MD        potassium chloride 20 mEq/50 mL IVPB (Central Line)  20 mEq Intravenous PRN Christi Mendez MD 50 mL/hr at 04/19/21 0749 20 mEq at 04/19/21 0749    magnesium sulfate 1000 mg in dextrose 5% 100 mL IVPB  1,000 mg Intravenous PRN Susan Beckett MD        chlorhexidine (PERIDEX) 0.12 % solution 15 mL  15 mL Mouth/Throat BID Susan Beckett MD   15 mL at 04/19/21 0758    mupirocin (BACTROBAN) 2 % ointment   Nasal BID Susan Beckett MD   Given at 04/19/21 1032    meropenem (MERREM) 500 mg IVPB (mini-bag)  500 mg Intravenous Jeff Casarez MD   Stopped at 04/19/21 1236    sodium chloride flush 0.9 % injection 10 mL  10 mL Intravenous 2 times per day Maria Elena Peacock MD   10 mL at 04/19/21 0759    sodium chloride flush 0.9 % injection 10 mL  10 mL Intravenous PRN MariaE lena Peacock MD        0.9 % sodium chloride infusion  25 mL Intravenous PRN Maria Elena Peacock MD        ondansetron [facility]) injection 4 mg  4 mg Intravenous Q4H PRN Maria Elena Peacock MD        polyethylene glycol [facility]) packet 17 g  17 g Oral Daily PRN Maria Elena Peacock MD        acetaminophen (TYLENOL) tablet 650 mg  650 mg Oral Q4H PRN Maria Elena Peacock MD        Or    acetaminophen (TYLENOL) suppository 650 mg  650 mg Rectal Q4H PRN Maria Elena Peacock MD        albuterol sulfate  (90 Base) MCG/ACT inhaler 2 puff  2 puff Inhalation Q6H PRN Maria Elena Peacock MD           Physical Exam:   /82   Pulse 87   Temp 99.7 °F (37.6 °C) (Axillary)   Resp 19   Ht 5' (1.524 m)   Wt 99 lb 6.8 oz (45.1 kg)   SpO2 92%   BMI 19.42 kg/m²     Intake/Output Summary (Last 24 hours) at 4/19/2021 1515  Last data filed at 4/19/2021 1208  Gross per 24 hour   Intake 4463.8 ml   Output 1370 ml   Net 3093.8 ml     Wt Readings from Last 2 Encounters:   04/19/21 99 lb 6.8 oz (45.1 kg)   02/17/20 125 lb 3.2 oz (56.8 kg)     Constitutional: He is oriented to person, place, and time. He appears well-developed and well-nourished. In no acute distress. Head: Normocephalic and atraumatic. Neck: Neck supple. No JVD present. Carotid bruit is not present. No mass and no thyromegaly present.  No lymphadenopathy present. Cardiovascular: Normal rate, regular rhythm, normal heart sounds and intact distal pulses. Exam reveals no gallop and no friction rub. No murmur heard. Pulmonary/Chest: Intubated . Breath sounds normal. No respiratory distress. He has no wheezes, rhonchi or rales. Abdominal: Soft, non-tender. Bowel sounds and aorta are normal. He exhibits no organomegaly, mass or bruit. Extremities: No edema, cyanosis, or clubbing. Pulses are 2+ radial/carotid/dorsalis pedis and posterior tibial bilaterally. Neurological: He is alert and oriented to person, place, and time. He has normal reflexes. No cranial nerve deficit. Coordination normal.   Skin: Skin is warm and dry. There is no rash or diaphoresis. Psychiatric: He has a normal mood and affect. His speech is normal and behavior is normal.     EKG Interpretation: Sinus rhythm with inferior and anterior ischemia    Lab Review:   Lab Results   Component Value Date    TRIG 75 08/26/2019    HDL 75 08/26/2019    LDLCALC 73 08/26/2019    LABVLDL 15 08/26/2019     Lab Results   Component Value Date     04/19/2021    K 4.1 04/19/2021    K 3.1 04/19/2021    BUN 22 04/19/2021    CREATININE 0.7 04/19/2021     Recent Labs     04/18/21  1006 04/19/21  0430   WBC 11.8* 15.7*   HGB 10.7* 9.5*   HCT 34.5* 29.8*    244       Assessment:  1. Non-ST elevation MI  2. Acute respiratory failure with hypoxia    Plan:  Sabrina Love is stable at present. I would continue the IV heparin for him. He does have anterior and inferior T wave inversions however his troponin assays trended down. It may be preferable to have him extubated prior to doing a left heart catheterization. Regardless, I am not sure there is urgency as his echocardiogram showed normal wall motion and LV function. We will continue to follow along with him.

## 2021-04-19 NOTE — PROGRESS NOTES
Pulmonary Progress Note    CC: Acute hypoxic/hypercarbic respiratory failure  Abnormal chest x-ray  Shock  Hyponatremia  Elevated troponin   Neuromuscular dystrophy     24 hr events: On mechanical ventilation with FiO2 of 70% and PEEP of 8. Had 990cc of urine output in the past 24 hours. PHYSICAL EXAM:  Blood pressure 111/63, pulse 82, temperature 98.9 °F (37.2 °C), temperature source Oral, resp. rate 16, height 5' (1.524 m), weight 99 lb 6.8 oz (45.1 kg), SpO2 94 %. on VC+ 14/450/70%/8, Ti-0.9secs      Intake/Output Summary (Last 24 hours) at 4/19/2021 0737  Last data filed at 4/19/2021 0602  Gross per 24 hour   Intake 4463.8 ml   Output 990 ml   Net 3473.8 ml       Gen: Well developed; well nourished  Eyes: No scleral icterus. No conjunctival injection. ENT:  Oropharynx with ETT. External appearance of ears and nose normal.  Neck: Trachea midline. No obvious mass. No visible thyroid enlargement    Respiratory: Clear to auscultation bilaterally on anterior exam, no accessory muscle use  Cardiovascular: Regular rate and rhythm, no appreciable murmurs. No edema. Gastrointestinal: Soft, non-tender. No hernia  Skin: Warm and dry. No rashes or ulcers on visible areas. Normal texture and turgor  Musculoskeletal: No cyanosis, clubbing or joint deformity.     Psychiatric: Intubated, but awake and interactive    Medications:  Current Facility-Administered Medications: propofol injection, 5-50 mcg/kg/min, Intravenous, Titrated  norepinephrine (LEVOPHED) 16 mg in dextrose 5% 250 mL infusion, 2 mcg/min, Intravenous, Continuous  azithromycin (ZITHROMAX) 500 mg in D5W 250ml addavial, 500 mg, Intravenous, Q24H  vasopressin 20 Units in dextrose 5 % 100 mL infusion, 0.03 Units/min, Intravenous, Continuous  vancomycin (VANCOCIN) intermittent dosing (placeholder), , Other, RX Placeholder  heparin 25,000 unit in sodium chloride 0.45% 250 mL (premix) infusion, 0-3,000 Units/hr, Intravenous, Continuous  pantoprazole (PROTONIX) 21  0200   APTT 23.0* 48.3* 61.6*       AB/19- 7.59/32/61 (on 70%)     Cultures:   Blood culture (): NGTD  Blood culture (): Pending   Nasal MRSA probe: Pending   COVID-19: Negative      Films:  Chest images and reports were reviewed by me. My interpretation is:  CXR (21): Bilateral infiltrates; ETT and CVC in place      Assessment:     Acute hypoxic/hypercarbic respiratory failure  Abnormal chest x-ray  Shock  Hyponatremia  Elevated troponin   Neuromuscular dystrophy     Plan:    Acute hypoxic/hypercarbic respiratory failure  -In the setting of neuromuscular dystrophy and bilateral infiltrates  -Continue mechanical ventilation. Wean FiO2 and PEEP as able to keep oxygen saturation>90%    Abnormal chest x-ray  -He has bilateral infiltrates which could be due to pneumonia or pulmonary edema  -Continue meropenem, vancomycin and azithromycin  -Repeat CXR in a.m.  -Check BNP  -Check sputum culture    Shock  -Echocardiogram is pending  -Broad spectrum antibiotics as above  -Check lactic acid  -Levophed to keep MAP>65    Hyponatremia  -May be due to positive fluid balance  -Will hold diuresis until off vasopressors    Elevated troponin   -Repeat EKG and troponin  -On heparin drip  -Cardiology following    Neuromuscular dystrophy   -Neurology following      Prophylaxis  DVT- on heparin drip  GI- protonix  VAP- peridex    I spent 45 minutes of critical care time with this patient excluding procedures.     Robert Hurt MD  [facility], Critical Care and Sleep

## 2021-04-19 NOTE — PROGRESS NOTES
Clinical Pharmacy Note  Vancomycin Consult    Zaid Jacobsen is a 54 y.o. male ordered Vancomycin for pneumonia; consult received from Dr. Charmaine Cat to manage therapy. Also receiving Meropenem. Patient Active Problem List   Diagnosis    Scoliosis    Muscular dystrophy ([address]    [facility] [address]    Anxiety    Essential hypertension    Obstructive sleep apnea    COPD, moderate (HCC)    GERD without esophagitis    Erectile dysfunction of organic origin    Nausea & vomiting    Diarrhea    Generalized weakness    Hyponatremia    Hypochloremia    Hyperglycemia    Pneumonia    Acute respiratory failure with hypoxia (HCC)       Allergies:  No known allergies     Temp max:  Temp (24hrs), Av.1 °F (37.3 °C), Min:98.6 °F (37 °C), Max:100.1 °F (37.8 °C)      Recent Labs     21  1421 21  0453 21  1006   WBC 10.8 7.3 11.8*       Recent Labs     21  1421 21  0453 21  1840   BUN * 24*   CREATININE <0.5* 0.9 0.9         Intake/Output Summary (Last 24 hours) at 2021 0339  Last data filed at 2021 0209  Gross per 24 hour   Intake 4346.79 ml   Output 890 ml   Net 3456.79 ml       Culture Results:  pending    Ht Readings from Last 1 Encounters:   21 5' (1.524 m)        Wt Readings from Last 1 Encounters:   21 97 lb 7.1 oz (44.2 kg)         CrCl cannot be calculated (Unknown ideal weight. ). Assessment/Plan:  Vanc random = 14.2 mg/L ~ 18 hours post 1000 mg dose    Given patient's poor urine output and continued pressor requirements, will continue to dose based on levels. Will dose vancomycin 500 mg IV x 1 now and check a random level at 1900 tonight to assist with subsequent dosing. Thank you for the consult. Further recommendations to follow.     Yohannes Cespedes, PharmD  2021 3:46 AM

## 2021-04-19 NOTE — PROGRESS NOTES
Clinical Pharmacy Note  Heparin Dosing       Lab Results   Component Value Date    APTT 45.0 04/19/2021     Lab Results   Component Value Date    HGB 9.5 04/19/2021    HCT 29.8 04/19/2021     04/19/2021    INR 0.92 06/28/2015       Current Infusion Rate: 584 units/hr    Plan:  Bolus: 1200 units  Rate: 660 units/hr  Next aPTT: 1630 4-19    Pharmacy will continue to monitor and adjust based on aPTT results.   Christophe Jones RPh 4/19/2021 10:14 AM

## 2021-04-19 NOTE — PROGRESS NOTES
Clinical Pharmacy Note  Heparin Dosing       Lab Results   Component Value Date    APTT 51.3 04/19/2021     Lab Results   Component Value Date    HGB 9.5 04/19/2021    HCT 29.8 04/19/2021     04/19/2021    INR 0.92 06/28/2015       Current Infusion Rate: 660 units/hr    Plan:  Bolus: 000 units  Rate: 660 units/hr  Next aPTT: 2300    Pharmacy will continue to monitor and adjust based on aPTT results.

## 2021-04-19 NOTE — CARE COORDINATION
INITIAL CASE MANAGEMENT ASSESSMENT    Reviewed chart, met with patient and his mother to assess possible discharge needs. Explained Case Management role/services. Living Situation: confirmed address, lives in an apartment with his 15year old son and fiance, 3 flights of steps to enter - having difficulty getting in and out of the apartment    ADLs: needs help with bathing and dressing, housekeeping - mother and fiance assisting     DME: has a cane, mother states he needs a walker and shower chair    PT/OT Recs: Not ordered at this time, patient on the ventilator     Active Services: none, has used [facility] and went to [facility] in the past     Transportation: not driving currently due to car being a stick shift, family to transport     Medications: confirmed [employer], states he also has Medicare - per updated Facesheet, patient has [place] , wants to use Med[facility] to [facility]    PCP: DELISA Smith      HD/PD: N/A    PLAN/COMMENTS: Plan is unknown at this time, will need PT/OT evaluation. Family and patient advising that he needs a walker and shower chair at home. They also are inquiring about pharmacies that deliver medications to the patient's home. List given. Call to [facility] with [facility] to advise of potential need for DME. CM provided contact information for patient or family to call with any questions. CM will follow and assist as needed.     Shannan Martinez RN, BSN, Case Management  [phone]  Electronically signed by Shannan Martinez RN on 4/19/2021 at 3:38 PM

## 2021-04-19 NOTE — PROGRESS NOTES
Clinical Pharmacy Note  Vancomycin Consult    Penny Vasquez is a 54 y.o. male ordered Vancomycin for pneumonia; consult received from   to manage therapy. Also receiving . Patient Active Problem List   Diagnosis    Scoliosis    [facility] [address]    Narcotic abuse ([facility] [address]    Anxiety    Essential hypertension    Obstructive sleep apnea    COPD, moderate (HCC)    GERD without esophagitis    Erectile dysfunction of organic origin    Nausea & vomiting    Diarrhea    Generalized weakness    Hyponatremia    Hypochloremia    Hyperglycemia    Pneumonia    Acute respiratory failure with hypoxia (HCC)    Elevated troponin    Acute respiratory failure with hypoxia and hypercapnia (HCC)    Abnormal chest x-ray    Shock ([facility] [address]       Allergies:  No known allergies     Temp max:  Temp (24hrs), Av.1 °F (37.3 °C), Min:98.6 °F (37 °C), Max:99.7 °F (37.6 °C)      Recent Labs     21  0453 21  1006 21  0430   WBC 7.3 11.8* 15.7*       Recent Labs     21  0453 21  1840 21  0430   BUN 21* 24* 22*   CREATININE 0.9 0.9 0.7*         Intake/Output Summary (Last 24 hours) at 2021 1925  Last data filed at 2021 1208  Gross per 24 hour   Intake 1671.4 ml   Output 1220 ml   Net 451.4 ml       Culture Results:      Ht Readings from Last 1 Encounters:   21 5' (1.524 m)        Wt Readings from Last 1 Encounters:   21 99 lb 6.8 oz (45.1 kg)         CrCl cannot be calculated (Unknown ideal weight. ). Assessment/Plan:  Vancomycin 500 mg IV x 1 . Level ordered for 0600. Thank you for the consult.

## 2021-04-19 NOTE — ACP (ADVANCE CARE PLANNING)
Advance Care Planning     Advance Care Planning Activator (Inpatient)  Conversation Note      Date of ACP Conversation: 4/19/2021    Conversation Conducted with: Patient with Decision Making Capacity    ACP Activator: Nik Mathew Decision Maker:     Current Designated Health Care Decision Maker:     Click here to complete Healthcare Decision Makers including section of the Healthcare Decision Maker Relationship (ie \"Primary\")  Today we documented Decision Maker(s) consistent with Legal Next of Kin hierarchy. Care Preferences    Ventilation: \"If you were in your present state of health and suddenly became very ill and were unable to breathe on your own, what would your preference be about the use of a ventilator (breathing machine) if it were available to you? \"      Would the patient desire the use of ventilator (breathing machine)? : patient currently on ventilator, will need to readdress when off    \"If your health worsens and it becomes clear that your chance of recovery is unlikely, what would your preference be about the use of a ventilator (breathing machine) if it were available to you? \"     Would the patient desire the use of ventilator (breathing machine)?: unsure at this time    Resuscitation  \"CPR works best to restart the heart when there is a sudden event, like a heart attack, in someone who is otherwise healthy. Unfortunately, CPR does not typically restart the heart for people who have serious health conditions or who are very sick. \"    \"In the event your heart stopped as a result of an underlying serious health condition, would you want attempts to be made to restart your heart (answer \"yes\" for attempt to resuscitate) or would you prefer a natural death (answer \"no\" for do not attempt to resuscitate)? \" need to readdress when patient is off the ventilator       [] Yes   [x] No   Educated Patient / Enoc Barrier regarding differences between Advance Directives and portable DNR orders. Length of ACP Conversation in minutes:  5    Conversation Outcomes:  [x] ACP discussion completed  [] Existing advance directive reviewed with patient; no changes to patient's previously recorded wishes  [] New Advance Directive completed  [] Portable Do Not Rescitate prepared for Provider review and signature  [] POLST/POST/MOLST/MOST prepared for Provider review and signature      Follow-up plan:    [] Schedule follow-up conversation to continue planning  [x] Referred individual to Provider for additional questions/concerns   [] Advised patient/agent/surrogate to review completed ACP document and update if needed with changes in condition, patient preferences or care setting    [x] This note routed to one or more involved healthcare providers      Spiritual services consult made for HPOA to make mother decision maker.            Marcos Mariano RN, BSN, Case Management  Phone: [phone]  Electronically signed by Marcos Mariano RN on 4/19/2021 at 4:28 PM

## 2021-04-20 ENCOUNTER — APPOINTMENT (OUTPATIENT)
Dept: GENERAL RADIOLOGY | Age: 56
DRG: 207 | End: 2021-04-20
Payer: MEDICARE

## 2021-04-20 LAB
ANION GAP SERPL CALCULATED.3IONS-SCNC: 12 MMOL/L (ref 3–16)
APPEARANCE BAL (LAVAGE): ABNORMAL
APTT: 48 SEC (ref 24.2–36.2)
APTT: 48.4 SEC (ref 24.2–36.2)
APTT: 52.6 SEC (ref 24.2–36.2)
APTT: 54.5 SEC (ref 24.2–36.2)
BASE EXCESS ARTERIAL: 5.1 MMOL/L (ref -3–3)
BASOPHILS ABSOLUTE: 0 K/UL (ref 0–0.2)
BASOPHILS RELATIVE PERCENT: 0.1 %
BUN BLDV-MCNC: 16 MG/DL (ref 7–20)
CALCIUM SERPL-MCNC: 7.8 MG/DL (ref 8.3–10.6)
CARBOXYHEMOGLOBIN ARTERIAL: 0.9 % (ref 0–1.5)
CHLORIDE BLD-SCNC: 100 MMOL/L (ref 99–110)
CLOT EVALUATION BAL: ABNORMAL
CO2: 23 MMOL/L (ref 21–32)
COLOR LAVAGE: ABNORMAL
CREAT SERPL-MCNC: <0.5 MG/DL (ref 0.9–1.3)
EOSINOPHILS ABSOLUTE: 0 K/UL (ref 0–0.6)
EOSINOPHILS RELATIVE PERCENT: 0.1 %
GFR AFRICAN AMERICAN: >60
GFR NON-AFRICAN AMERICAN: >60
GLUCOSE BLD-MCNC: 135 MG/DL (ref 70–99)
GLUCOSE BLD-MCNC: 143 MG/DL (ref 70–99)
GLUCOSE BLD-MCNC: 65 MG/DL (ref 70–99)
GLUCOSE BLD-MCNC: 77 MG/DL (ref 70–99)
GLUCOSE BLD-MCNC: 78 MG/DL (ref 70–99)
GLUCOSE BLD-MCNC: 85 MG/DL (ref 70–99)
GLUCOSE BLD-MCNC: 86 MG/DL (ref 70–99)
GLUCOSE BLD-MCNC: 91 MG/DL (ref 70–99)
HCO3 ARTERIAL: 29.1 MMOL/L (ref 21–29)
HCT VFR BLD CALC: 27.3 % (ref 40.5–52.5)
HCT VFR BLD CALC: 28.5 % (ref 40.5–52.5)
HEMOGLOBIN, ART, EXTENDED: 9 G/DL (ref 13.5–17.5)
HEMOGLOBIN: 8.6 G/DL (ref 13.5–17.5)
HEMOGLOBIN: 8.9 G/DL (ref 13.5–17.5)
LYMPHOCYTES ABSOLUTE: 0.7 K/UL (ref 1–5.1)
LYMPHOCYTES RELATIVE PERCENT: 4.8 %
LYMPHOCYTES, BAL: 12 % (ref 5–10)
MACROPHAGES, BAL: 1 % (ref 90–95)
MAGNESIUM: 1.8 MG/DL (ref 1.8–2.4)
MCH RBC QN AUTO: 25.3 PG (ref 26–34)
MCHC RBC AUTO-ENTMCNC: 31.3 G/DL (ref 31–36)
MCV RBC AUTO: 80.8 FL (ref 80–100)
METHEMOGLOBIN ARTERIAL: 0.3 %
MONOCYTES ABSOLUTE: 0.5 K/UL (ref 0–1.3)
MONOCYTES RELATIVE PERCENT: 3.1 %
MONOCYTES, BAL: 1 %
MRSA SCREEN RT-PCR: NORMAL
NEUTROPHILS ABSOLUTE: 14.3 K/UL (ref 1.7–7.7)
NEUTROPHILS RELATIVE PERCENT: 91.9 %
NUMBER OF CELLS COUNTED BAL (LAVAGE): 100
O2 CONTENT ARTERIAL: 12 ML/DL
O2 SAT, ARTERIAL: 93.4 %
O2 THERAPY: ABNORMAL
PCO2 ARTERIAL: 39.6 MMHG (ref 35–45)
PDW BLD-RTO: 15.8 % (ref 12.4–15.4)
PERFORMED ON: ABNORMAL
PERFORMED ON: ABNORMAL
PERFORMED ON: NORMAL
PH ARTERIAL: 7.47 (ref 7.35–7.45)
PHOSPHORUS: 2.6 MG/DL (ref 2.5–4.9)
PLATELET # BLD: 211 K/UL (ref 135–450)
PMV BLD AUTO: 8.2 FL (ref 5–10.5)
PO2 ARTERIAL: 67.4 MMHG (ref 75–108)
POTASSIUM REFLEX MAGNESIUM: 3.1 MMOL/L (ref 3.5–5.1)
POTASSIUM SERPL-SCNC: 3.9 MMOL/L (ref 3.5–5.1)
RBC # BLD: 3.38 M/UL (ref 4.2–5.9)
RBC, BAL: 752 /CUMM
SEGMENTED NEUTROPHILS, BAL: 86 % (ref 5–10)
SODIUM BLD-SCNC: 135 MMOL/L (ref 136–145)
TCO2 ARTERIAL: 30.3 MMOL/L
VANCOMYCIN RANDOM: 12.4 UG/ML
VOLUME LAVAGE: 10 ML
WBC # BLD: 15.6 K/UL (ref 4–11)
WBC/EPI CELLS BAL: 743 /CUMM

## 2021-04-20 PROCEDURE — 2580000003 HC RX 258: Performed by: INTERNAL MEDICINE

## 2021-04-20 PROCEDURE — 85014 HEMATOCRIT: CPT

## 2021-04-20 PROCEDURE — 71045 X-RAY EXAM CHEST 1 VIEW: CPT

## 2021-04-20 PROCEDURE — 2580000003 HC RX 258: Performed by: NURSE PRACTITIONER

## 2021-04-20 PROCEDURE — 37799 UNLISTED PX VASCULAR SURGERY: CPT

## 2021-04-20 PROCEDURE — 6370000000 HC RX 637 (ALT 250 FOR IP): Performed by: NURSE PRACTITIONER

## 2021-04-20 PROCEDURE — 2580000003 HC RX 258: Performed by: STUDENT IN AN ORGANIZED HEALTH CARE EDUCATION/TRAINING PROGRAM

## 2021-04-20 PROCEDURE — 2700000000 HC OXYGEN THERAPY PER DAY

## 2021-04-20 PROCEDURE — 83735 ASSAY OF MAGNESIUM: CPT

## 2021-04-20 PROCEDURE — 31624 DX BRONCHOSCOPE/LAVAGE: CPT | Performed by: INTERNAL MEDICINE

## 2021-04-20 PROCEDURE — 82803 BLOOD GASES ANY COMBINATION: CPT

## 2021-04-20 PROCEDURE — 2000000000 HC ICU R&B

## 2021-04-20 PROCEDURE — 0B9F8ZX DRAINAGE OF RIGHT LOWER LUNG LOBE, VIA NATURAL OR ARTIFICIAL OPENING ENDOSCOPIC, DIAGNOSTIC: ICD-10-PCS | Performed by: INTERNAL MEDICINE

## 2021-04-20 PROCEDURE — 6360000002 HC RX W HCPCS: Performed by: INTERNAL MEDICINE

## 2021-04-20 PROCEDURE — 84100 ASSAY OF PHOSPHORUS: CPT

## 2021-04-20 PROCEDURE — 85018 HEMOGLOBIN: CPT

## 2021-04-20 PROCEDURE — 84132 ASSAY OF SERUM POTASSIUM: CPT

## 2021-04-20 PROCEDURE — 99291 CRITICAL CARE FIRST HOUR: CPT | Performed by: INTERNAL MEDICINE

## 2021-04-20 PROCEDURE — 87070 CULTURE OTHR SPECIMN AEROBIC: CPT

## 2021-04-20 PROCEDURE — 94640 AIRWAY INHALATION TREATMENT: CPT

## 2021-04-20 PROCEDURE — 88112 CYTOPATH CELL ENHANCE TECH: CPT

## 2021-04-20 PROCEDURE — C9113 INJ PANTOPRAZOLE SODIUM, VIA: HCPCS | Performed by: INTERNAL MEDICINE

## 2021-04-20 PROCEDURE — 94003 VENT MGMT INPAT SUBQ DAY: CPT

## 2021-04-20 PROCEDURE — 87205 SMEAR GRAM STAIN: CPT

## 2021-04-20 PROCEDURE — 80202 ASSAY OF VANCOMYCIN: CPT

## 2021-04-20 PROCEDURE — 85730 THROMBOPLASTIN TIME PARTIAL: CPT

## 2021-04-20 PROCEDURE — 6370000000 HC RX 637 (ALT 250 FOR IP): Performed by: INTERNAL MEDICINE

## 2021-04-20 PROCEDURE — 99233 SBSQ HOSP IP/OBS HIGH 50: CPT | Performed by: INTERNAL MEDICINE

## 2021-04-20 PROCEDURE — 88305 TISSUE EXAM BY PATHOLOGIST: CPT

## 2021-04-20 PROCEDURE — 2500000003 HC RX 250 WO HCPCS: Performed by: INTERNAL MEDICINE

## 2021-04-20 PROCEDURE — 6360000002 HC RX W HCPCS: Performed by: NURSE PRACTITIONER

## 2021-04-20 PROCEDURE — 85025 COMPLETE CBC W/AUTO DIFF WBC: CPT

## 2021-04-20 PROCEDURE — APPNB15 APP NON BILLABLE TIME 0-15 MINS: Performed by: NURSE PRACTITIONER

## 2021-04-20 PROCEDURE — 31622 DX BRONCHOSCOPE/WASH: CPT

## 2021-04-20 PROCEDURE — 99152 MOD SED SAME PHYS/QHP 5/>YRS: CPT | Performed by: INTERNAL MEDICINE

## 2021-04-20 PROCEDURE — 80048 BASIC METABOLIC PNL TOTAL CA: CPT

## 2021-04-20 PROCEDURE — 94760 N-INVAS EAR/PLS OXIMETRY 1: CPT

## 2021-04-20 PROCEDURE — 89051 BODY FLUID CELL COUNT: CPT

## 2021-04-20 PROCEDURE — 6360000002 HC RX W HCPCS

## 2021-04-20 RX ORDER — FENTANYL CITRATE 50 UG/ML
25 INJECTION, SOLUTION INTRAMUSCULAR; INTRAVENOUS
Status: DISCONTINUED | OUTPATIENT
Start: 2021-04-20 | End: 2021-04-22

## 2021-04-20 RX ORDER — HEPARIN SODIUM 1000 [USP'U]/ML
1200 INJECTION, SOLUTION INTRAVENOUS; SUBCUTANEOUS ONCE
Status: COMPLETED | OUTPATIENT
Start: 2021-04-20 | End: 2021-04-20

## 2021-04-20 RX ORDER — SODIUM CHLORIDE FOR INHALATION 3 %
15 VIAL, NEBULIZER (ML) INHALATION 3 TIMES DAILY
Status: DISCONTINUED | OUTPATIENT
Start: 2021-04-20 | End: 2021-04-26 | Stop reason: HOSPADM

## 2021-04-20 RX ORDER — MIDAZOLAM HYDROCHLORIDE 1 MG/ML
INJECTION INTRAMUSCULAR; INTRAVENOUS
Status: COMPLETED
Start: 2021-04-20 | End: 2021-04-20

## 2021-04-20 RX ORDER — IPRATROPIUM BROMIDE AND ALBUTEROL SULFATE 2.5; .5 MG/3ML; MG/3ML
1 SOLUTION RESPIRATORY (INHALATION) EVERY 4 HOURS
Status: DISCONTINUED | OUTPATIENT
Start: 2021-04-20 | End: 2021-04-26 | Stop reason: HOSPADM

## 2021-04-20 RX ORDER — DEXTROSE MONOHYDRATE 25 G/50ML
12.5 INJECTION, SOLUTION INTRAVENOUS PRN
Status: COMPLETED | OUTPATIENT
Start: 2021-04-20 | End: 2021-04-20

## 2021-04-20 RX ORDER — LIDOCAINE HYDROCHLORIDE 10 MG/ML
INJECTION, SOLUTION EPIDURAL; INFILTRATION; INTRACAUDAL; PERINEURAL
Status: DISCONTINUED
Start: 2021-04-20 | End: 2021-04-20 | Stop reason: WASHOUT

## 2021-04-20 RX ORDER — MAGNESIUM SULFATE IN WATER 40 MG/ML
2000 INJECTION, SOLUTION INTRAVENOUS ONCE
Status: COMPLETED | OUTPATIENT
Start: 2021-04-20 | End: 2021-04-20

## 2021-04-20 RX ORDER — FENTANYL CITRATE 50 UG/ML
INJECTION, SOLUTION INTRAMUSCULAR; INTRAVENOUS
Status: COMPLETED
Start: 2021-04-20 | End: 2021-04-20

## 2021-04-20 RX ORDER — ASPIRIN 81 MG/1
81 TABLET, CHEWABLE ORAL DAILY
Status: DISCONTINUED | OUTPATIENT
Start: 2021-04-20 | End: 2021-04-26 | Stop reason: HOSPADM

## 2021-04-20 RX ORDER — POTASSIUM CHLORIDE 29.8 MG/ML
20 INJECTION INTRAVENOUS
Status: COMPLETED | OUTPATIENT
Start: 2021-04-20 | End: 2021-04-20

## 2021-04-20 RX ADMIN — SODIUM CHLORIDE, PRESERVATIVE FREE 10 ML: 5 INJECTION INTRAVENOUS at 19:57

## 2021-04-20 RX ADMIN — MUPIROCIN: 20 OINTMENT TOPICAL at 19:57

## 2021-04-20 RX ADMIN — SODIUM CHLORIDE SOLN NEBU 3% 15 ML: 3 NEBU SOLN at 11:39

## 2021-04-20 RX ADMIN — FENTANYL CITRATE 50 MCG: 50 INJECTION, SOLUTION INTRAMUSCULAR; INTRAVENOUS at 13:18

## 2021-04-20 RX ADMIN — ASPIRIN 81 MG: 81 TABLET, CHEWABLE ORAL at 16:51

## 2021-04-20 RX ADMIN — VANCOMYCIN HYDROCHLORIDE 500 MG: 1 INJECTION, POWDER, LYOPHILIZED, FOR SOLUTION INTRAVENOUS at 20:57

## 2021-04-20 RX ADMIN — HEPARIN SODIUM 740 UNITS/HR: 10000 INJECTION, SOLUTION INTRAVENOUS at 01:33

## 2021-04-20 RX ADMIN — CHLORHEXIDINE GLUCONATE 15 ML: 1.2 RINSE ORAL at 19:57

## 2021-04-20 RX ADMIN — VANCOMYCIN HYDROCHLORIDE 500 MG: 1 INJECTION, POWDER, LYOPHILIZED, FOR SOLUTION INTRAVENOUS at 12:22

## 2021-04-20 RX ADMIN — FENTANYL CITRATE 50 MCG: 50 INJECTION INTRAMUSCULAR; INTRAVENOUS at 13:18

## 2021-04-20 RX ADMIN — HEPARIN SODIUM 1200 UNITS: 1000 INJECTION INTRAVENOUS; SUBCUTANEOUS at 01:31

## 2021-04-20 RX ADMIN — SODIUM CHLORIDE, PRESERVATIVE FREE 10 ML: 5 INJECTION INTRAVENOUS at 08:30

## 2021-04-20 RX ADMIN — MEROPENEM 500 MG: 500 INJECTION, POWDER, FOR SOLUTION INTRAVENOUS at 20:06

## 2021-04-20 RX ADMIN — METOPROLOL TARTRATE 12.5 MG: 25 TABLET, FILM COATED ORAL at 20:07

## 2021-04-20 RX ADMIN — IPRATROPIUM BROMIDE AND ALBUTEROL SULFATE 1 AMPULE: .5; 3 SOLUTION RESPIRATORY (INHALATION) at 11:38

## 2021-04-20 RX ADMIN — POTASSIUM CHLORIDE 20 MEQ: 29.8 INJECTION, SOLUTION INTRAVENOUS at 08:32

## 2021-04-20 RX ADMIN — POTASSIUM CHLORIDE 20 MEQ: 29.8 INJECTION, SOLUTION INTRAVENOUS at 10:46

## 2021-04-20 RX ADMIN — MEROPENEM 500 MG: 500 INJECTION, POWDER, FOR SOLUTION INTRAVENOUS at 14:43

## 2021-04-20 RX ADMIN — PANTOPRAZOLE SODIUM 40 MG: 40 INJECTION, POWDER, FOR SOLUTION INTRAVENOUS at 08:42

## 2021-04-20 RX ADMIN — Medication 10 ML: at 08:42

## 2021-04-20 RX ADMIN — CALCIUM GLUCONATE 2000 MG: 20 INJECTION, SOLUTION INTRAVENOUS at 08:32

## 2021-04-20 RX ADMIN — SODIUM CHLORIDE SOLN NEBU 3% 4 ML: 3 NEBU SOLN at 20:49

## 2021-04-20 RX ADMIN — Medication 25 MCG/HR: at 09:55

## 2021-04-20 RX ADMIN — MAGNESIUM SULFATE HEPTAHYDRATE 2000 MG: 40 INJECTION, SOLUTION INTRAVENOUS at 10:52

## 2021-04-20 RX ADMIN — MIDAZOLAM 2 MG: 1 INJECTION INTRAMUSCULAR; INTRAVENOUS at 13:23

## 2021-04-20 RX ADMIN — MIDAZOLAM 2 MG: 1 INJECTION INTRAMUSCULAR; INTRAVENOUS at 13:16

## 2021-04-20 RX ADMIN — MUPIROCIN: 20 OINTMENT TOPICAL at 08:45

## 2021-04-20 RX ADMIN — CHLORHEXIDINE GLUCONATE 15 ML: 1.2 RINSE ORAL at 08:31

## 2021-04-20 RX ADMIN — HEPARIN SODIUM 1200 UNITS: 1000 INJECTION INTRAVENOUS; SUBCUTANEOUS at 12:15

## 2021-04-20 RX ADMIN — DEXTROSE MONOHYDRATE 12.5 G: 25 INJECTION, SOLUTION INTRAVENOUS at 04:25

## 2021-04-20 RX ADMIN — MEROPENEM 500 MG: 500 INJECTION, POWDER, FOR SOLUTION INTRAVENOUS at 03:57

## 2021-04-20 RX ADMIN — IPRATROPIUM BROMIDE AND ALBUTEROL SULFATE 1 AMPULE: .5; 3 SOLUTION RESPIRATORY (INHALATION) at 20:49

## 2021-04-20 RX ADMIN — AZITHROMYCIN MONOHYDRATE 500 MG: 500 INJECTION, POWDER, LYOPHILIZED, FOR SOLUTION INTRAVENOUS at 09:23

## 2021-04-20 RX ADMIN — PROPOFOL 10 MCG/KG/MIN: 10 INJECTION, EMULSION INTRAVENOUS at 23:35

## 2021-04-20 RX ADMIN — IPRATROPIUM BROMIDE AND ALBUTEROL SULFATE 1 AMPULE: .5; 3 SOLUTION RESPIRATORY (INHALATION) at 15:57

## 2021-04-20 ASSESSMENT — PULMONARY FUNCTION TESTS
PIF_VALUE: 21
PIF_VALUE: 24
PIF_VALUE: 24
PIF_VALUE: 25
PIF_VALUE: 20
PIF_VALUE: 22
PIF_VALUE: 20
PIF_VALUE: 20
PIF_VALUE: 23
PIF_VALUE: 27
PIF_VALUE: 21
PIF_VALUE: 24
PIF_VALUE: 21
PIF_VALUE: 19
PIF_VALUE: 23

## 2021-04-20 ASSESSMENT — PAIN SCALES - GENERAL: PAINLEVEL_OUTOF10: 0

## 2021-04-20 NOTE — PROGRESS NOTES
[address]   Daily Progress Note      Admit Date:  4/17/2021      Subjective:   Mr. Joellen Hoyt is a 51yo male with a past medical history significant for COPD and essential hypertension who presented to the [facility] ED via squad with respiratory failure. He is felt to have some mucous plugging and overnight required an increase in his FiO2 to 80%. This is been weaned down to 60% now. We were asked to evaluate him for elevated troponin assays found incidentally along with some ECG changes.     Interval history:  Genia Dinh remains intubated. He is on 60% FiO2 with a PEEP of 5 cm of water. No events overnight. He has a planned bronchoscopy today for presumed mucous plugging. Objective:     /72   Pulse 73   Temp 98.2 °F (36.8 °C) (Skin)   Resp 12   Ht 5' (1.524 m)   Wt 100 lb 8.5 oz (45.6 kg)   SpO2 94%   BMI 19.63 kg/m²      Intake/Output Summary (Last 24 hours) at 4/20/2021 1536  Last data filed at 4/20/2021 1435  Gross per 24 hour   Intake 232.81 ml   Output 1160 ml   Net -927.19 ml       Physical Exam:  General:  Awake, alert, NAD  Skin:  Warm and dry  Neck:  JVD<8, no carotid bruits  Chest: Intubated.   Diminished bilaterally no wheezes/rhonchi/rales  Cardiovascular:  RRR, normal S1/S2, no M/R/G  Abdomen:  Soft, nontender, +bowel sounds  Extremities:  No edema  Pulses: 2+ bilat carotid    2+ bilat radial    2+ bilat femoral        Medications:    vancomycin  500 mg Intravenous Q12H    ipratropium-albuterol  1 ampule Inhalation Q4H    sodium chloride (Inhalant)  15 mL Nebulization TID    metoprolol tartrate  12.5 mg Per NG tube BID    aspirin  81 mg Per NG tube Daily    DULoxetine  30 mg Oral Daily    azithromycin  500 mg Intravenous Q24H    vancomycin (VANCOCIN) intermittent dosing (placeholder)   Other RX Placeholder    pantoprazole  40 mg Intravenous Daily    And    sodium chloride (PF)  10 mL Intravenous Daily    chlorhexidine  15 mL Mouth/Throat BID    mupirocin catheterization could be done while he is intubated. His right ventricle was quite dilated on the echocardiogram this could be from his lung disease acutely as the function was preserved.           Signed:  Mack Boyer MD

## 2021-04-20 NOTE — PROGRESS NOTES
Pulmonary Progress Note    CC: Acute hypoxic/hypercarbic respiratory failure  Abnormal chest x-ray  Shock  Hyponatremia  Elevated troponin   Neuromuscular dystrophy     24 hr events: On mechanical ventilation. FiO2 increased from 60% to 80%. Weaned off levophed. PHYSICAL EXAM:  Blood pressure 111/77, pulse 82, temperature 98.3 °F (36.8 °C), temperature source Axillary, resp. rate 23, height 5' (1.524 m), weight 100 lb 8.5 oz (45.6 kg), SpO2 94 %. on VC+ 14/350/80%/5, Ti-0.9secs      Intake/Output Summary (Last 24 hours) at 4/20/2021 0744  Last data filed at 4/20/2021 0641  Gross per 24 hour   Intake 232.81 ml   Output 1025 ml   Net -792.19 ml       Gen: Well developed; well nourished  Eyes: No scleral icterus. No conjunctival injection. ENT:  Oropharynx with ETT. External appearance of ears and nose normal.  Neck: Trachea midline. No obvious mass. No visible thyroid enlargement    Respiratory: Rhonchi bilaterally, no accessory muscle use  Cardiovascular: Regular rate and rhythm, no appreciable murmurs. No edema. Skin: Warm and dry. No rashes or ulcers on visible areas. Normal texture and turgor  Musculoskeletal: No cyanosis, clubbing or joint deformity.     Psychiatric: Intubated and sedated     Medications:  Current Facility-Administered Medications: DULoxetine (CYMBALTA) extended release capsule 30 mg, 30 mg, Oral, Daily  propofol injection, 5-50 mcg/kg/min, Intravenous, Titrated  norepinephrine (LEVOPHED) 16 mg in dextrose 5% 250 mL infusion, 2 mcg/min, Intravenous, Continuous  azithromycin (ZITHROMAX) 500 mg in D5W 250ml addavial, 500 mg, Intravenous, Q24H  vancomycin (VANCOCIN) intermittent dosing (placeholder), , Other, RX Placeholder  heparin 25,000 unit in sodium chloride 0.45% 250 mL (premix) infusion, 0-3,000 Units/hr, Intravenous, Continuous  pantoprazole (PROTONIX) injection 40 mg, 40 mg, Intravenous, Daily **AND** sodium chloride (PF) 0.9 % injection 10 mL, 10 mL, Intravenous, Daily  sodium culture (4/18): NGTD  Urine culture (4/18): Negative  Sputum culture (4/19): 1+WBCs; no organisms   Nasal MRSA probe: Negative  COVID-19: Negative      Films:  Chest images and reports were reviewed by me. My interpretation is:  CXR (4/20/21): Consolidation in BLL; ETT and CVC in place      Assessment:     Acute hypoxic/hypercarbic respiratory failure  Abnormal chest x-ray  Shock  Hyponatremia  Elevated troponin   Neuromuscular dystrophy     Plan:    Acute hypoxic/hypercarbic respiratory failure  -In the setting of neuromuscular dystrophy and bilateral lower lobe consolidation   -Continue mechanical ventilation. Wean FiO2 as able to keep oxygen saturation>90%    Abnormal chest x-ray  -He has bilateral lower lobe consolidation which may be due to mucus plugging  -Start Duonebs ad 3%saline nebulizers   -Continue meropenem, vancomycin and azithromycin (day #3)  -Will do bronchoscopy today for airway inspection and clearance      Shock  -Broad spectrum antibiotics as above  -Now off levophed     Hyponatremia  -Improved. Strict I/Os  -Check labs in a.m. Elevated troponin   -On heparin drip  -Cardiology following    Neuromuscular dystrophy   -Neurology following      Prophylaxis  DVT- on heparin drip  GI- protonix  VAP- peridex    I spent 40 minutes of critical care time with this patient excluding procedures.     Torin Bull MD  [facility] Pulmonary, Critical Care and Sleep

## 2021-04-20 NOTE — PROCEDURES
BRONCHOSCOPY WITH BAL    Bronchoscopy was performed in the ICU while patient was intubated. Indications: 54year old man with respiratory failure, BLL opacity and concern for mucus plugging. Sedation:  Fentanyl 50 mcg, Versed 4 mg (given as 2mg, then 2mg). Patient also on propofol and fentanyl drips    ASA Class III    EBL: None    Type of sedation used: Moderate sedation  Physician/patient face to face sedation start time: 13:14  Physician/patient face to face sedation stop time: 13:28  Total moderate sedation time in minutes: 14 minutes     Patient was monitored continuously 1:1 throughout the entire procedure while sedation was administered. Narrative:  After consent was obtained and adequate sedation was achieved the bronchoscope was introduced through the endotracheal tube. There were copious thin yellow secretions in the right lower lobe. These were removed the suctioning. The mucosa was inflamed and friable. There were scant thin secretions in the left lower lobe which were removed with suctioning. The mucosa was inflamed and friable. Lavage was performed in the right lower lobe. Lavage was performed in the right lower lobe and sent for cytology and microbiology. Specimens: Right lower lobe BAL    Post-procedure diagnosis: Bilateral lower lobe pneumonia    The patient tolerated the procedure well and there were no known complications. Acacia Dudley MD  [facility] Pulmonology, Sleep and Critical Care.

## 2021-04-20 NOTE — PROGRESS NOTES
Clinical Pharmacy Note  Vancomycin Consult    Juan Luis Garcia is a 54 y.o. male ordered Vancomycin for pneumonia; consult received from Dr. Antonio Dwyer to manage therapy. Also receiving meropenem and azithromycin. Patient Active Problem List   Diagnosis    Scoliosis    Muscular dystrophy ([address]    Narcotic abuse ([address]    Anxiety    Essential hypertension    Obstructive sleep apnea    COPD, moderate (HCC)    GERD without esophagitis    Erectile dysfunction of organic origin    Nausea & vomiting    Diarrhea    Generalized weakness    Hyponatremia    Hypochloremia    Hyperglycemia    Pneumonia    Acute respiratory failure with hypoxia (HCC)    Elevated troponin    Acute respiratory failure with hypoxia and hypercapnia (HCC)    Abnormal chest x-ray    Shock ([address]       Allergies:  No known allergies     Temp max:  Temp (24hrs), Av.7 °F (37.1 °C), Min:98.2 °F (36.8 °C), Max:99.2 °F (37.3 °C)      Recent Labs     21  1006 21  0430 21  0449   WBC 11.8* 15.7* 15.6*       Recent Labs     21  1840 21  0430 21  0449   BUN 24* 22* 16   CREATININE 0.9 0.7* <0.5*         Intake/Output Summary (Last 24 hours) at 2021 1319  Last data filed at 2021 1131  Gross per 24 hour   Intake 232.81 ml   Output 910 ml   Net -677.19 ml       Ht Readings from Last 1 Encounters:   21 5' (1.524 m)        Wt Readings from Last 1 Encounters:   21 100 lb 8.5 oz (45.6 kg)         Assessment:  Day # 3 of vancomycin. Current regimen: Intermittent dosing based on levels   Random level: 12.4 mg/L    Plan:  Vancomycin 500 mg IV every 12 hours. Thank you for the consult.      Barbara Crenshaw, PharmD, [employer]

## 2021-04-20 NOTE — PROGRESS NOTES
5- RN in room to assess Pt. Pt oriented and able to follow commands, is able to write using pen and paper to communicate. Pt noted to be moving restlessly, nods head yes when asked if uncomfortable, repositioned, nods head yes when asked if sedation should be increased. See MAR.   2330- CVC dressing changed on LIJ due to oral secretions. 0400- Perfectserve to Dr Mayte Shah for Pt's BG 77, Pt responsive, see MAR.  0118- Repeat .  9914- Call from Pt's mother Tonie Abarca, updates given. She asked about getting the POA signed and was informed that that would likely wait until Pt is off the vent and no longer sedated.   12- Handoff report with Jorge SINGH

## 2021-04-20 NOTE — PROGRESS NOTES
Progress Note    Updates  No significant events overnight. Patient is easily able to be aroused. Follows commands.      Active Ambulatory Problems     Diagnosis Date Noted    Scoliosis     Muscular dystrophy ([address]     [facility] abuse [facility]     Anxiety     Essential hypertension     Obstructive sleep apnea     COPD, moderate (HCC)     GERD without esophagitis     Erectile dysfunction of organic origin     Nausea & vomiting 11/13/2016    Diarrhea 11/13/2016    Generalized weakness 11/13/2016    Hyponatremia 11/13/2016    Hypochloremia 11/13/2016    Hyperglycemia     Pneumonia 08/26/2019     Past Surgical History:   Procedure Laterality Date    ACHILLES TENDON SURGERY      both legs    CHOLECYSTECTOMY      INTUBATION  4/18/2021          Current Facility-Administered Medications:     calcium gluconate 2-0.675 GM/100ML-% 2,000 mg IVPB, 2,000 mg, Intravenous, Once, DELISA Kowalski - CNP    magnesium sulfate 2000 mg in 50 mL IVPB premix, 2,000 mg, Intravenous, Once, Yudi Kat APRN - CNP    potassium chloride 20 mEq/50 mL IVPB (Central Line), 20 mEq, Intravenous, Q1H, Jacque Rubinstein, APRN - CNP, Last Rate: 50 mL/hr at 04/20/21 0832, 20 mEq at 04/20/21 0832    vancomycin (VANCOCIN) 500 mg in dextrose 5 % 250 mL IVPB, 500 mg, Intravenous, Q12H, Martinez Bone MD    DULoxetine (CYMBALTA) extended release capsule 30 mg, 30 mg, Oral, Daily, DELISA Kowalski - CNP    propofol injection, 5-50 mcg/kg/min, Intravenous, Titrated, Martinez Bone MD, Last Rate: 3.3 mL/hr at 04/20/21 0512, 10 mcg/kg/min at 04/20/21 0512    norepinephrine (LEVOPHED) 16 mg in dextrose 5% 250 mL infusion, 2 mcg/min, Intravenous, Continuous, Martinez Bone MD, Stopped at 04/19/21 1124    azithromycin (ZITHROMAX) 500 mg in D5W 250ml addavial, 500 mg, Intravenous, Q24H, Nolberto Nur MD, Stopped at 04/20/21 0724    vancomycin (VANCOCIN) intermittent dosing (placeholder), , Other, RX MD    albuterol sulfate  (90 Base) MCG/ACT inhaler 2 puff, 2 puff, Inhalation, Q6H PRN, Link Vicente MD    Exam  Blood pressure 125/78, pulse 81, temperature 98.9 °F (37.2 °C), temperature source Axillary, resp. rate 12, height 5' (1.524 m), weight 100 lb 8.5 oz (45.6 kg), SpO2 94 %. Constitutional    Vital signs: BP, HR, and RR reviewed   General alert, no distress,. Intubated. Eyes: unable to visualize the fundi  Cardiovascular: no peripheral edema. Psychiatric: cooperative with examination  Neurologic  Mental status:   orientation nods and shakes head appropriately. Attention intact as able to attend well to the exam     Language intubated. Comprehension follows simple commands  Cranial nerves:   CN2: blink to threat bilaterally. CN 3,4,6: extraocular muscles intact  CN7: face symmetric though exam limited by ET tube. CN8: hearing grossly intact  CN11: trap full strength on shoulder shrug  CN12: tongue protrusion aubrey at this time. Strength: no gross focal paresis. Antigravity in BLE. UE in soft wrist restraints though without any noticeable weakness. Able to give me a \"thumbs up\" bilaterally. Sensory: light touch intact in all 4 extremities. Cerebellar/coordination: finger nose finger aubrey. Tone: normal in all 4 extremities  Gait: deferred at this time given intubation. ROS - afebrile. Chart reviewed. Labs  Glucose 78  Na 135  K 3.1  Mg 1.80  BUN 16  Cr < 0.5    WBC 15.6K  Hg 8.6  Platelets 031    Respiratory culture negative  Urine culture NG  Blood cultures NG  COVID negative    ABG 4/20/21  pH, Arterial 7.475 (H)   pCO2, Arterial 39.6   pO2, Arterial 67.4 (L)   HCO3, Arterial 29.1 (H)   TCO2 (calc), Art 30.3     Studies  No current brain imaging. Chest XR 4/20/21  Increasing bilateral lower lobe airspace disease with volume loss concerning   for mucous plugging and lower lobe collapse.  Pneumonia also considered.      TTE 4/19/21   Ejection fraction is visually estimated to be 55-60%. Normal diastolic function. Severly dilated right ventricle. Right ventricular systolic function is normal   No significant valvular heart disease    Impression  1. Acute respiratory failure in a patient w/ a reported hx of congenital muscular dystrophy. Unfortunately there is minimal documentation available pertaining to his hx of MD in the chart. Family previously reported a functional decline in the last 6 months or so. Recommendations  1. Supportive care.       Vanita Reagan NP  [address] [place] [address] [address] Neurology    A copy of this note was provided for Dr Elicia Oscar MD

## 2021-04-20 NOTE — PROGRESS NOTES
Clinical Pharmacy Note  Heparin Dosing       Lab Results   Component Value Date    APTT 48.4 04/20/2021     Lab Results   Component Value Date    HGB 8.6 04/20/2021    HCT 27.3 04/20/2021     04/20/2021    INR 0.92 06/28/2015       Current Infusion Rate: 740 units/hr    Plan:  Bolus: 1200 units  Rate: increase to 820 units/hr  Next aPTT: 1800  4/20/21    Pharmacy will continue to monitor and adjust based on aPTT results.   Viviana Bob [facility],4/20/2021,12:07 PM

## 2021-04-20 NOTE — PROGRESS NOTES
Clinical Pharmacy Note  Heparin Dosing       Lab Results   Component Value Date    APTT 43.8 04/19/2021     Lab Results   Component Value Date    HGB 9.5 04/19/2021    HCT 29.8 04/19/2021     04/19/2021    INR 0.92 06/28/2015       Current Infusion Rate: 660 units/hr    Plan:  Bolus: 1200 units  Rate: Increase to 740 units/hr  Next aPTT: 0700  4/20/21    Pharmacy will continue to monitor and adjust based on aPTT results.     Luis Marie, PharmD  4/20/2021 12:56 AM

## 2021-04-20 NOTE — PROGRESS NOTES
Hospitalist Progress Note      PCP: DELISA Veliz - CNP    Date of Admission: 4/17/2021    Chief Complaint:   Chief Complaint   Patient presents with    Shortness of Breath     sob for a few days, pt has MD      Hospital Course: 71-year-old with past medical history of muscular dystrophy, COPD, hypertension, GERD, RONDA who presented with shortness of breath. On presentation per EMS was 69% on room air. Covid negative. CTPA negative for large PE or infiltrate. Hypercapnic respiratory failure and started on BiPAP, did not tolerate and required intubation evening of 4/17 and started on pressors. Subjective: Off pressors. Still intubated. Spoke with mother at bedside about goals of care, see ACP note.     Medications:  Reviewed    Infusion Medications    propofol 10 mcg/kg/min (04/20/21 0512)    norepinephrine Stopped (04/19/21 1124)    heparin (PORCINE) Infusion 740 Units/hr (04/20/21 0133)    sodium chloride       Scheduled Medications    DULoxetine  30 mg Oral Daily    azithromycin  500 mg Intravenous Q24H    vancomycin (VANCOCIN) intermittent dosing (placeholder)   Other RX Placeholder    pantoprazole  40 mg Intravenous Daily    And    sodium chloride (PF)  10 mL Intravenous Daily    chlorhexidine  15 mL Mouth/Throat BID    mupirocin   Nasal BID    meropenem  500 mg Intravenous Q8H    sodium chloride flush  10 mL Intravenous 2 times per day     PRN Meds: sodium phosphate IVPB **OR** sodium phosphate IVPB, potassium chloride, magnesium sulfate, sodium chloride flush, sodium chloride, ondansetron, polyethylene glycol, acetaminophen **OR** acetaminophen, albuterol sulfate HFA      Intake/Output Summary (Last 24 hours) at 4/20/2021 0753  Last data filed at 4/20/2021 0750  Gross per 24 hour   Intake 232.81 ml   Output 1085 ml   Net -852.19 ml       Physical Exam Performed:    /77   Pulse 82   Temp 98.9 °F (37.2 °C) (Axillary)   Resp 23   Ht 5' (1.524 m)   Wt 100 lb 8.5 oz (45.6 kg)   SpO2 94%   BMI 19.63 kg/m²     General appearance: No apparent distress, appears stated age  [de-identified]: Pupils equal, round, and reactive to light. Conjunctivae/corneas clear. Neck: Supple, with full range of motion. Trachea midline. Respiratory:  Normal respiratory effort. Clear to auscultation, bilaterally without Rales/Wheezes/Rhonchi. Intubated  Cardiovascular: Regular rate and rhythm with normal S1/S2 without murmurs, rubs or gallops. Abdomen: Soft, non-tender, non-distended with normal bowel sounds. Musculoskeletal: No clubbing, cyanosis or edema bilaterally. Full range of motion without deformity. Skin: Skin color, texture, turgor normal.  No rashes or lesions. Neurologic: Intubated and sedated  Psychiatric: Intubated and sedated   capillary Refill: Brisk,< 3 seconds   Peripheral Pulses: +2 palpable, equal bilaterally       Labs:   Recent Labs     04/18/21  1006 04/19/21  0430 04/20/21  0449   WBC 11.8* 15.7* 15.6*   HGB 10.7* 9.5* 8.6*   HCT 34.5* 29.8* 27.3*    244 211     Recent Labs     04/18/21  1840 04/19/21  0430 04/19/21  1000 04/20/21  0449   * 129*  --  135*   K 4.0 3.1* 4.1 3.1*   CL 95* 92*  --  100   CO2 29 27  --  23   BUN 24* 22*  --  16   CREATININE 0.9 0.7*  --  <0.5*   CALCIUM 7.1* 7.3*  --  7.8*   PHOS 3.7 2.6  --  2.6     Recent Labs     04/17/21  1421   AST 18   ALT 23   BILITOT <0.2   ALKPHOS 66     No results for input(s): INR in the last 72 hours. Recent Labs     04/17/21  1524 04/17/21  1524 04/18/21  1006 04/18/21  1840 04/19/21  1000   CKTOTAL 41  --   --   --   --    TROPONINI  --    < > 0.15* 0.16* 0.15*    < > = values in this interval not displayed.        Urinalysis:      Lab Results   Component Value Date    NITRU Negative 04/18/2021    WBCUA 10 04/18/2021    BACTERIA 3+ 11/30/2014    RBCUA 45 04/18/2021    BLOODU MODERATE 04/18/2021    SPECGRAV >1.030 04/18/2021    GLUCOSEU 250 04/18/2021    GLUCOSEU NEGATIVE 03/19/2011       Radiology:  XR CHEST PORTABLE   Final Result   Increasing bilateral lower lobe airspace disease with volume loss concerning   for mucous plugging and lower lobe collapse. Pneumonia also considered. The findings were sent to the Radiology Results [address] at 7:02   am on 4/20/2021to be communicated to a licensed caregiver. XR CHEST PORTABLE   Final Result   1. Appropriate positioning of endotracheal tube. 2. Mild cardiomegaly. 3. Appropriate radiographic positioning of intervally placed left internal   jugular approach central venous catheter. XR CHEST PORTABLE   Final Result   Endotracheal tube in satisfactory position. Mild/moderate cardiomegaly. Prominent gaseous distension of the small and large bowel. CT CHEST PULMONARY EMBOLISM W CONTRAST   Final Result   Linear atelectasis versus scarring right middle lobe. No evidence for acute   infiltrates. This study is not optimized for evaluation of the pulmonary arteries. There are no central filling defects however the possibly of small distal   pulmonary emboli cannot be totally excluded         XR CHEST PORTABLE   Final Result   Moderate cardiomegaly without pulmonary vascular congestion. Mild bibasilar   atelectasis worse on the left. Some underlying scarring also suspected. Assessment/Plan:    Active Hospital Problems    Diagnosis    Elevated troponin [R77.8]    Acute respiratory failure with hypoxia and hypercapnia (HCC) [J96.01, J96.02]    Abnormal chest x-ray [R93.89]    Shock ([address] [R57.9]    Acute respiratory failure with hypoxia (HCC) [J96.01]    Generalized weakness [R53.1]    Essential hypertension [I10]    COPD, moderate (HCC) [J44.9]    Muscular dystrophy (HCC) [G71.00]     Acute on chronic hypercapnic respiratory failure  Likely due to weakness from underlying muscular dystrophy which is with worsening over the last year.   Attempted BiPAP initially, patient did not tolerate and required intubation.  -Continue mechanical ventilation  -Pulmonology following    Muscular dystrophy  Poor prognosis, reportedly had end-of-life discussion about 6 months ago and did not want trach or PEG.  -Neurology consulted    Shock  Unclear etiology, likely sepsis. Initial procal low but trending up.  -Continue pressors, wean as able. No infiltrates on CTPA initially but most recent chest x-ray does show bilateral infiltrates  -Continue broad-spectrum antibiotics  -Cultures negative to date    Hyponatremia  Sodium downtrending to 129, improved to 135.   - monitor    Elevated troponin  EKG changes with T wave inversions, peak 0.16.  -Cardiology consulted  -Heparin drip  -TTE    KOSTAS  Creatinine baseline less than 1.5, up to 0.9. Downtrended. RONDA  Not tolerating BiPAP. DM  Well controlled, on metformin at home  - LDSSI if needed, has been well controlled. DVT Prophylaxis: heparin drip  Diet: Diet NPO Effective Now  Code Status: Full Code    PT/OT Eval Status: deferred    Dispo - pending    Santos Powers MD    This note was transcribed using [id number]. Please disregard any translational errors.

## 2021-04-20 NOTE — PROGRESS NOTES
Clinical Pharmacy Note  Heparin Dosing       Lab Results   Component Value Date    APTT 52.6 04/20/2021     Lab Results   Component Value Date    HGB 8.9 04/20/2021    HCT 28.5 04/20/2021     04/20/2021    INR 0.92 06/28/2015       Current Infusion Rate: 820 units/hr    Plan:    No change in rate. Continue: 820 units/hr  Next aPTT: 0000  04/21/21    Pharmacy will continue to monitor and adjust based on aPTT results.     Seth Kumar, PharmD, BCPS  4/20/2021  7:51 PM

## 2021-04-20 NOTE — PROGRESS NOTES
Clinical Pharmacy Note  Heparin Dosing       Lab Results   Component Value Date    APTT 54.5 04/20/2021     Lab Results   Component Value Date    HGB 8.6 04/20/2021    HCT 27.3 04/20/2021     04/20/2021    INR 0.92 06/28/2015       Current Infusion Rate: 740 units/hr    Plan:  Rate: continue 740 units/hr  Next aPTT: 1000  4/20/21    Pharmacy will continue to monitor and adjust based on aPTT results.   Viviana Bob [facility],4/20/2021,8:04 AM

## 2021-04-21 LAB
ANION GAP SERPL CALCULATED.3IONS-SCNC: 12 MMOL/L (ref 3–16)
APTT: 55.6 SEC (ref 24.2–36.2)
APTT: 58.7 SEC (ref 24.2–36.2)
BASE EXCESS ARTERIAL: 2.8 MMOL/L (ref -3–3)
BASOPHILS ABSOLUTE: 0 K/UL (ref 0–0.2)
BASOPHILS RELATIVE PERCENT: 0.4 %
BLOOD CULTURE, ROUTINE: NORMAL
BUN BLDV-MCNC: 9 MG/DL (ref 7–20)
CALCIUM SERPL-MCNC: 7.8 MG/DL (ref 8.3–10.6)
CARBOXYHEMOGLOBIN ARTERIAL: 1.2 % (ref 0–1.5)
CHLORIDE BLD-SCNC: 98 MMOL/L (ref 99–110)
CO2: 24 MMOL/L (ref 21–32)
CREAT SERPL-MCNC: <0.5 MG/DL (ref 0.9–1.3)
CULTURE, RESPIRATORY: NORMAL
EOSINOPHILS ABSOLUTE: 0 K/UL (ref 0–0.6)
EOSINOPHILS RELATIVE PERCENT: 0.3 %
GFR AFRICAN AMERICAN: >60
GFR NON-AFRICAN AMERICAN: >60
GLUCOSE BLD-MCNC: 101 MG/DL (ref 70–99)
GLUCOSE BLD-MCNC: 74 MG/DL (ref 70–99)
GLUCOSE BLD-MCNC: 77 MG/DL (ref 70–99)
GLUCOSE BLD-MCNC: 78 MG/DL (ref 70–99)
GLUCOSE BLD-MCNC: 81 MG/DL (ref 70–99)
GLUCOSE BLD-MCNC: 83 MG/DL (ref 70–99)
GRAM STAIN RESULT: NORMAL
HCO3 ARTERIAL: 27.5 MMOL/L (ref 21–29)
HCT VFR BLD CALC: 27.7 % (ref 40.5–52.5)
HEMOGLOBIN, ART, EXTENDED: 9.3 G/DL (ref 13.5–17.5)
HEMOGLOBIN: 8.7 G/DL (ref 13.5–17.5)
LYMPHOCYTES ABSOLUTE: 1.2 K/UL (ref 1–5.1)
LYMPHOCYTES RELATIVE PERCENT: 9.7 %
MAGNESIUM: 2 MG/DL (ref 1.8–2.4)
MCH RBC QN AUTO: 25.3 PG (ref 26–34)
MCHC RBC AUTO-ENTMCNC: 31.3 G/DL (ref 31–36)
MCV RBC AUTO: 81 FL (ref 80–100)
METHEMOGLOBIN ARTERIAL: 0.7 %
MONOCYTES ABSOLUTE: 0.6 K/UL (ref 0–1.3)
MONOCYTES RELATIVE PERCENT: 5.1 %
NEUTROPHILS ABSOLUTE: 10.8 K/UL (ref 1.7–7.7)
NEUTROPHILS RELATIVE PERCENT: 84.5 %
O2 CONTENT ARTERIAL: 13 ML/DL
O2 SAT, ARTERIAL: 98.9 %
O2 THERAPY: ABNORMAL
PCO2 ARTERIAL: 42.1 MMHG (ref 35–45)
PDW BLD-RTO: 15.9 % (ref 12.4–15.4)
PERFORMED ON: ABNORMAL
PERFORMED ON: NORMAL
PH ARTERIAL: 7.42 (ref 7.35–7.45)
PHOSPHORUS: 3.1 MG/DL (ref 2.5–4.9)
PLATELET # BLD: 207 K/UL (ref 135–450)
PMV BLD AUTO: 8.5 FL (ref 5–10.5)
PO2 ARTERIAL: 108 MMHG (ref 75–108)
POTASSIUM REFLEX MAGNESIUM: 3.4 MMOL/L (ref 3.5–5.1)
POTASSIUM SERPL-SCNC: 4.4 MMOL/L (ref 3.5–5.1)
PROCALCITONIN: 0.43 NG/ML (ref 0–0.15)
RBC # BLD: 3.42 M/UL (ref 4.2–5.9)
SODIUM BLD-SCNC: 134 MMOL/L (ref 136–145)
TCO2 ARTERIAL: 28.8 MMOL/L
VANCOMYCIN TROUGH: 10.7 UG/ML (ref 10–20)
WBC # BLD: 12.8 K/UL (ref 4–11)

## 2021-04-21 PROCEDURE — 2500000003 HC RX 250 WO HCPCS: Performed by: INTERNAL MEDICINE

## 2021-04-21 PROCEDURE — 99291 CRITICAL CARE FIRST HOUR: CPT | Performed by: INTERNAL MEDICINE

## 2021-04-21 PROCEDURE — 2580000003 HC RX 258: Performed by: INTERNAL MEDICINE

## 2021-04-21 PROCEDURE — 85025 COMPLETE CBC W/AUTO DIFF WBC: CPT

## 2021-04-21 PROCEDURE — 6360000002 HC RX W HCPCS: Performed by: INTERNAL MEDICINE

## 2021-04-21 PROCEDURE — 85730 THROMBOPLASTIN TIME PARTIAL: CPT

## 2021-04-21 PROCEDURE — 94640 AIRWAY INHALATION TREATMENT: CPT

## 2021-04-21 PROCEDURE — 6360000002 HC RX W HCPCS: Performed by: NURSE PRACTITIONER

## 2021-04-21 PROCEDURE — 80202 ASSAY OF VANCOMYCIN: CPT

## 2021-04-21 PROCEDURE — 6370000000 HC RX 637 (ALT 250 FOR IP): Performed by: NURSE PRACTITIONER

## 2021-04-21 PROCEDURE — 84100 ASSAY OF PHOSPHORUS: CPT

## 2021-04-21 PROCEDURE — 6370000000 HC RX 637 (ALT 250 FOR IP): Performed by: INTERNAL MEDICINE

## 2021-04-21 PROCEDURE — 2700000000 HC OXYGEN THERAPY PER DAY

## 2021-04-21 PROCEDURE — 94761 N-INVAS EAR/PLS OXIMETRY MLT: CPT

## 2021-04-21 PROCEDURE — 84132 ASSAY OF SERUM POTASSIUM: CPT

## 2021-04-21 PROCEDURE — 2580000003 HC RX 258: Performed by: NURSE PRACTITIONER

## 2021-04-21 PROCEDURE — 84145 PROCALCITONIN (PCT): CPT

## 2021-04-21 PROCEDURE — 2000000000 HC ICU R&B

## 2021-04-21 PROCEDURE — 80048 BASIC METABOLIC PNL TOTAL CA: CPT

## 2021-04-21 PROCEDURE — 94003 VENT MGMT INPAT SUBQ DAY: CPT

## 2021-04-21 PROCEDURE — APPNB15 APP NON BILLABLE TIME 0-15 MINS: Performed by: NURSE PRACTITIONER

## 2021-04-21 PROCEDURE — 83735 ASSAY OF MAGNESIUM: CPT

## 2021-04-21 PROCEDURE — C9113 INJ PANTOPRAZOLE SODIUM, VIA: HCPCS | Performed by: INTERNAL MEDICINE

## 2021-04-21 PROCEDURE — 82803 BLOOD GASES ANY COMBINATION: CPT

## 2021-04-21 RX ORDER — HEPARIN SODIUM 1000 [USP'U]/ML
1200 INJECTION, SOLUTION INTRAVENOUS; SUBCUTANEOUS ONCE
Status: COMPLETED | OUTPATIENT
Start: 2021-04-21 | End: 2021-04-21

## 2021-04-21 RX ORDER — POTASSIUM CHLORIDE 29.8 MG/ML
20 INJECTION INTRAVENOUS
Status: COMPLETED | OUTPATIENT
Start: 2021-04-21 | End: 2021-04-21

## 2021-04-21 RX ADMIN — ASPIRIN 81 MG: 81 TABLET, CHEWABLE ORAL at 08:58

## 2021-04-21 RX ADMIN — SODIUM CHLORIDE, PRESERVATIVE FREE 10 ML: 5 INJECTION INTRAVENOUS at 09:15

## 2021-04-21 RX ADMIN — SODIUM CHLORIDE, PRESERVATIVE FREE 10 ML: 5 INJECTION INTRAVENOUS at 20:09

## 2021-04-21 RX ADMIN — METOPROLOL TARTRATE 12.5 MG: 25 TABLET, FILM COATED ORAL at 08:59

## 2021-04-21 RX ADMIN — Medication 20 MEQ: at 10:30

## 2021-04-21 RX ADMIN — CHLORHEXIDINE GLUCONATE 15 ML: 1.2 RINSE ORAL at 09:01

## 2021-04-21 RX ADMIN — MEROPENEM 500 MG: 500 INJECTION, POWDER, FOR SOLUTION INTRAVENOUS at 04:52

## 2021-04-21 RX ADMIN — MUPIROCIN: 20 OINTMENT TOPICAL at 20:09

## 2021-04-21 RX ADMIN — VANCOMYCIN HYDROCHLORIDE 500 MG: 1 INJECTION, POWDER, LYOPHILIZED, FOR SOLUTION INTRAVENOUS at 09:19

## 2021-04-21 RX ADMIN — IPRATROPIUM BROMIDE AND ALBUTEROL SULFATE 1 AMPULE: .5; 3 SOLUTION RESPIRATORY (INHALATION) at 20:14

## 2021-04-21 RX ADMIN — CHLORHEXIDINE GLUCONATE 15 ML: 1.2 RINSE ORAL at 20:09

## 2021-04-21 RX ADMIN — HEPARIN SODIUM 1200 UNITS: 1000 INJECTION INTRAVENOUS; SUBCUTANEOUS at 01:09

## 2021-04-21 RX ADMIN — MEROPENEM 500 MG: 500 INJECTION, POWDER, FOR SOLUTION INTRAVENOUS at 11:37

## 2021-04-21 RX ADMIN — HEPARIN SODIUM 900 UNITS/HR: 10000 INJECTION, SOLUTION INTRAVENOUS at 09:23

## 2021-04-21 RX ADMIN — AZITHROMYCIN MONOHYDRATE 500 MG: 500 INJECTION, POWDER, LYOPHILIZED, FOR SOLUTION INTRAVENOUS at 05:28

## 2021-04-21 RX ADMIN — SODIUM CHLORIDE SOLN NEBU 3% 15 ML: 3 NEBU SOLN at 08:33

## 2021-04-21 RX ADMIN — IPRATROPIUM BROMIDE AND ALBUTEROL SULFATE 1 AMPULE: .5; 3 SOLUTION RESPIRATORY (INHALATION) at 00:34

## 2021-04-21 RX ADMIN — Medication 10 ML: at 09:19

## 2021-04-21 RX ADMIN — PANTOPRAZOLE SODIUM 40 MG: 40 INJECTION, POWDER, FOR SOLUTION INTRAVENOUS at 09:03

## 2021-04-21 RX ADMIN — METOPROLOL TARTRATE 12.5 MG: 25 TABLET, FILM COATED ORAL at 20:10

## 2021-04-21 RX ADMIN — IPRATROPIUM BROMIDE AND ALBUTEROL SULFATE 1 AMPULE: .5; 3 SOLUTION RESPIRATORY (INHALATION) at 08:33

## 2021-04-21 RX ADMIN — IPRATROPIUM BROMIDE AND ALBUTEROL SULFATE 1 AMPULE: .5; 3 SOLUTION RESPIRATORY (INHALATION) at 04:02

## 2021-04-21 RX ADMIN — MUPIROCIN: 20 OINTMENT TOPICAL at 09:17

## 2021-04-21 RX ADMIN — Medication 25 MCG/HR: at 05:16

## 2021-04-21 RX ADMIN — IPRATROPIUM BROMIDE AND ALBUTEROL SULFATE 1 AMPULE: .5; 3 SOLUTION RESPIRATORY (INHALATION) at 15:48

## 2021-04-21 RX ADMIN — SODIUM CHLORIDE SOLN NEBU 3% 4 ML: 3 NEBU SOLN at 20:15

## 2021-04-21 RX ADMIN — MEROPENEM 500 MG: 500 INJECTION, POWDER, FOR SOLUTION INTRAVENOUS at 20:01

## 2021-04-21 RX ADMIN — Medication 10 ML: at 20:10

## 2021-04-21 RX ADMIN — SODIUM CHLORIDE SOLN NEBU 3% 15 ML: 3 NEBU SOLN at 12:06

## 2021-04-21 RX ADMIN — IPRATROPIUM BROMIDE AND ALBUTEROL SULFATE 1 AMPULE: .5; 3 SOLUTION RESPIRATORY (INHALATION) at 12:06

## 2021-04-21 RX ADMIN — VANCOMYCIN HYDROCHLORIDE 500 MG: 1 INJECTION, POWDER, LYOPHILIZED, FOR SOLUTION INTRAVENOUS at 21:01

## 2021-04-21 RX ADMIN — Medication 20 MEQ: at 09:03

## 2021-04-21 RX ADMIN — DULOXETINE HYDROCHLORIDE 30 MG: 30 CAPSULE, DELAYED RELEASE ORAL at 08:58

## 2021-04-21 ASSESSMENT — PULMONARY FUNCTION TESTS
PIF_VALUE: 21
PIF_VALUE: 23
PIF_VALUE: 11
PIF_VALUE: 11
PIF_VALUE: 21
PIF_VALUE: 11
PIF_VALUE: 10
PIF_VALUE: 21
PIF_VALUE: 26
PIF_VALUE: 21
PIF_VALUE: 11
PIF_VALUE: 22
PIF_VALUE: 11
PIF_VALUE: 15
PIF_VALUE: 22
PIF_VALUE: 20

## 2021-04-21 NOTE — PROGRESS NOTES
0700: Report received from Best Peters [place].     0900: Assessment completed. See charting. 1258: propofol and fentanyl shut off at this time to prepare for SBT. 1345: Patient vent switched to Spontaneous mode by RT.     1555: Dr. Amelia Coleman in to see patient. Patient not pulling very high Tidal volumes. Will flip vent back, re-sedate, and attempt again in the morning. 1900: Report given to Best Peters RN.      Electronically signed by Craig Jose RN on 4/21/2021 at 6:50 PM

## 2021-04-21 NOTE — PROGRESS NOTES
Clinical Pharmacy Note  Heparin Dosing       Lab Results   Component Value Date    APTT 48.0 04/20/2021     Lab Results   Component Value Date    HGB 8.9 04/20/2021    HCT 28.5 04/20/2021     04/20/2021    INR 0.92 06/28/2015       Current Infusion Rate: 820 units/hr    Plan:  Bolus: 1200 units  Rate: Increase to  900 units/hr  Next aPTT: 0700  04/21/21    Pharmacy will continue to monitor and adjust based on aPTT results.     Pricilla Candelaria PharmD  4/21/2021 12:58 AM

## 2021-04-21 NOTE — PROGRESS NOTES
Hospitalist Progress Note      PCP: DELISA Hensley CNP    Date of Admission: 4/17/2021    Chief Complaint: SOB    Hospital Course: Patient is a 51-year-old male with a past medical history of muscular dystrophy, COPD, hypertension who presented to the hospital with shortness of breath. Initially on nasal cannula and then requiring BiPAP. Patient refusing BiPAP and eventually rapid response called and patient was intubated. 4/18 Patient currently intubated and sedated on pressors. Per family patient's muscular dystrophy has been progressing over the past 6 months including his pulmonary and myocardial tissues. Mother and aunt at bedside and discussed long road ahead in regards to getting him off the ventilator if he has advanced muscular dystrophy    4/19 Patient awake alert on the vent to later today. Patient is following commands. Discussed that we will try and continue to wean him off the ventilator but this may be an issue with his history of muscular dystrophy. He is currently now on only Levophed and we will continue to titrate this off. Subjective: Patient seen and examined. Patient still requiring the ventilator with 60% FiO2 and a PEEP of 5. Continue to titrate off ventilator in hopes of possible spontaneous breathing trial.  Patient underwent bronchoscopy on April 20 with bilateral lower lobe pneumonia.     Medications:  Reviewed    Infusion Medications    fentaNYL 25 mcg/hr (04/21/21 0516)    propofol 10 mcg/kg/min (04/20/21 2335)    norepinephrine Stopped (04/19/21 1124)    heparin (PORCINE) Infusion 900 Units/hr (04/21/21 0111)    sodium chloride       Scheduled Medications    vancomycin  500 mg Intravenous Q12H    ipratropium-albuterol  1 ampule Inhalation Q4H    sodium chloride (Inhalant)  15 mL Nebulization TID    metoprolol tartrate  12.5 mg Per NG tube BID    aspirin  81 mg Per NG tube Daily    DULoxetine  30 mg Oral Daily    azithromycin  500 mg Intravenous Q24H    vancomycin (VANCOCIN) intermittent dosing (placeholder)   Other RX Placeholder    pantoprazole  40 mg Intravenous Daily    And    sodium chloride (PF)  10 mL Intravenous Daily    chlorhexidine  15 mL Mouth/Throat BID    mupirocin   Nasal BID    meropenem  500 mg Intravenous Q8H    sodium chloride flush  10 mL Intravenous 2 times per day     PRN Meds: fentanNYL, sodium phosphate IVPB **OR** sodium phosphate IVPB, potassium chloride, magnesium sulfate, sodium chloride flush, sodium chloride, ondansetron, polyethylene glycol, acetaminophen **OR** acetaminophen, albuterol sulfate HFA      Intake/Output Summary (Last 24 hours) at 4/21/2021 0839  Last data filed at 4/21/2021 0630  Gross per 24 hour   Intake 2077.8 ml   Output 1450 ml   Net 627.8 ml       Physical Exam Performed:    /71   Pulse 63   Temp 98.3 °F (36.8 °C) (Axillary)   Resp 14   Ht 5' (1.524 m)   Wt 101 lb 10.1 oz (46.1 kg)   SpO2 97%   BMI 19.85 kg/m²     General appearance:  Intubated and sedated  HEENT: Pupils equal, round, and reactive to light. Conjunctivae/corneas clear. Neck: Supple, with full range of motion. No jugular venous distention. Trachea midline. Respiratory:  Normal respiratory effort. Diminished breath sounds bilateral cardiovascular: Regular rate and rhythm with normal S1/S2 without murmurs, rubs or gallops. Abdomen: Soft, non-tender, non-distended with normal bowel sounds. Musculoskeletal: No clubbing, cyanosis or edema bilaterally. Skin: Skin color, texture, turgor normal.  No rashes or lesions.   Neurologic: Intubated and sedated  Psychiatric: Intubated and sedated  Capillary Refill: Brisk,< 3 seconds   Peripheral Pulses: +2 palpable, equal bilaterally       Labs:   Recent Labs     04/19/21  0430 04/20/21  0449 04/20/21  1450 04/21/21  0453   WBC 15.7* 15.6*  --  12.8*   HGB 9.5* 8.6* 8.9* 8.7*   HCT 29.8* 27.3* 28.5* 27.7*    211  --  207     Recent Labs     04/19/21  0430 04/19/21  0430 04/20/21  0449 04/20/21  1450 04/21/21  0453   *  --  135*  --  134*   K 3.1*   < > 3.1* 3.9 3.4*   CL 92*  --  100  --  98*   CO2 27  --  23  --  24   BUN 22*  --  16  --  9   CREATININE 0.7*  --  <0.5*  --  <0.5*   CALCIUM 7.3*  --  7.8*  --  7.8*   PHOS 2.6  --  2.6  --  3.1    < > = values in this interval not displayed. No results for input(s): AST, ALT, BILIDIR, BILITOT, ALKPHOS in the last 72 hours. No results for input(s): INR in the last 72 hours. Recent Labs     04/18/21  1006 04/18/21  1840 04/19/21  1000   TROPONINI 0.15* 0.16* 0.15*       Urinalysis:      Lab Results   Component Value Date    NITRU Negative 04/18/2021    WBCUA 10 04/18/2021    BACTERIA 3+ 11/30/2014    RBCUA 45 04/18/2021    BLOODU MODERATE 04/18/2021    SPECGRAV >1.030 04/18/2021    GLUCOSEU 250 04/18/2021    GLUCOSEU NEGATIVE 03/19/2011       Radiology:  XR CHEST PORTABLE   Final Result   Stable perihilar edema with bilateral pleural effusions and bibasilar volume   loss. Enteric catheter tip in the distal gastric body or gastric antrum just below   the lower margin of the image. XR CHEST PORTABLE   Final Result   Increasing bilateral lower lobe airspace disease with volume loss concerning   for mucous plugging and lower lobe collapse. Pneumonia also considered. The findings were sent to the Radiology Results [id number] at 7:02   am on 4/20/2021to be communicated to a licensed caregiver. XR CHEST PORTABLE   Final Result   1. Appropriate positioning of endotracheal tube. 2. Mild cardiomegaly. 3. Appropriate radiographic positioning of intervally placed left internal   jugular approach central venous catheter. XR CHEST PORTABLE   Final Result   Endotracheal tube in satisfactory position. Mild/moderate cardiomegaly. Prominent gaseous distension of the small and large bowel.          CT CHEST PULMONARY EMBOLISM W CONTRAST   Final Result   Linear atelectasis versus scarring right middle lobe. No evidence for acute   infiltrates. This study is not optimized for evaluation of the pulmonary arteries. There are no central filling defects however the possibly of small distal   pulmonary emboli cannot be totally excluded         XR CHEST PORTABLE   Final Result   Moderate cardiomegaly without pulmonary vascular congestion. Mild bibasilar   atelectasis worse on the left. Some underlying scarring also suspected. Assessment/Plan:    Acute on chronic hypercapnic respiratory failure  Likely secondary to muscular dystrophy  Continue ventilatory support  Concern that this will be difficult to extubate  Continue to titrate off ventilator    Muscular dystrophy  Neurology consulted  Overall poor prognosis as there has been progression in the past 1 year    Shock  Possibly secondary to sedation and ventilation  Continue with broad-spectrum antibiotics  Off all pressors    KOSTAS  Creatinine remained stable  Continue IV fluids    Elevated troponin  Denies any chest pain  Serial troponin stable   echo with normal ejection fraction of 55 to 60%, severely dilated right ventricle    Hypokalemia  Replete and recheck      DVT Prophylaxis: Lovenox  Diet: DIET TUBE FEED CONTINUOUS/CYCLIC NPO; Semi-elemental (Vital 1.2);  Orogastric; 20; 45  Code Status: Full Code    PT/OT Eval Status: Not applicable     Dispo - ICU    Omi Angeles MD

## 2021-04-21 NOTE — PROGRESS NOTES
Clinical Pharmacy Note  Heparin Dosing       Lab Results   Component Value Date    APTT 55.6 04/21/2021     Lab Results   Component Value Date    HGB 8.7 04/21/2021    HCT 27.7 04/21/2021     04/21/2021    INR 0.92 06/28/2015       Current Infusion Rate: 900 units/hr    Plan:  Rate: Continue  900 units/hr  Next aPTT: 0600  04/22/21    Pharmacy will continue to monitor and adjust based on aPTT results.     Tatiana Figueroa, PharmD  4/21/2021 3:14 PM

## 2021-04-21 NOTE — PROGRESS NOTES
Pulmonary Progress Note    CC: Acute hypoxic/hypercarbic respiratory failure  Abnormal chest x-ray  Shock  Hyponatremia  Elevated troponin   Neuromuscular dystrophy     24 hr events:  Underwent bronchoscopy yesterday. On mechanical ventilation with FiO2 of 60%. Had 1.5L of urine output in the past 24 hours. PHYSICAL EXAM:  Blood pressure 114/71, pulse 73, temperature 98.3 °F (36.8 °C), temperature source Axillary, resp. rate 14, height 5' (1.524 m), weight 101 lb 10.1 oz (46.1 kg), SpO2 95 %. on VC+ 14/350/60%/5, Ti-0.9secs      Intake/Output Summary (Last 24 hours) at 4/21/2021 4132  Last data filed at 4/21/2021 0630  Gross per 24 hour   Intake 2077.8 ml   Output 1450 ml   Net 627.8 ml       Gen: Well developed; well nourished  Eyes: No scleral icterus. No conjunctival injection. ENT:  Oropharynx with ETT. External appearance of ears and nose normal.  Neck: Trachea midline. No obvious mass. No visible thyroid enlargement    Respiratory: Course breath sounds bilaterally, no accessory muscle use  Cardiovascular: Regular rate and rhythm, no appreciable murmurs. No edema. Skin: Warm and dry. No rashes or ulcers on visible areas. Normal texture and turgor  Musculoskeletal: No cyanosis, clubbing or joint deformity.     Psychiatric: Intubated and sedated     Medications:  Current Facility-Administered Medications: vancomycin (VANCOCIN) 500 mg in dextrose 5 % 250 mL IVPB, 500 mg, Intravenous, Q12H  fentaNYL 10 mcg/mL infusion, 12.5-200 mcg/hr, Intravenous, Continuous  fentaNYL (SUBLIMAZE) injection 25 mcg, 25 mcg, Intravenous, Q1H PRN  ipratropium-albuterol (DUONEB) nebulizer solution 1 ampule, 1 ampule, Inhalation, Q4H  sodium chloride (Inhalant) 3 % nebulizer solution 15 mL, 15 mL, Nebulization, TID  metoprolol tartrate (LOPRESSOR) tablet 12.5 mg, 12.5 mg, Per NG tube, BID  aspirin chewable tablet 81 mg, 81 mg, Per NG tube, Daily  DULoxetine (CYMBALTA) extended release capsule 30 mg, 30 mg, Oral, Daily  propofol injection, 5-50 mcg/kg/min, Intravenous, Titrated  norepinephrine (LEVOPHED) 16 mg in dextrose 5% 250 mL infusion, 2 mcg/min, Intravenous, Continuous  azithromycin (ZITHROMAX) 500 mg in D5W 250ml addavial, 500 mg, Intravenous, Q24H  vancomycin (VANCOCIN) intermittent dosing (placeholder), , Other, RX Placeholder  heparin 25,000 unit in sodium chloride 0.45% 250 mL (premix) infusion, 0-3,000 Units/hr, Intravenous, Continuous  pantoprazole (PROTONIX) injection 40 mg, 40 mg, Intravenous, Daily **AND** sodium chloride (PF) 0.9 % injection 10 mL, 10 mL, Intravenous, Daily  sodium phosphate 7.08 mmol in dextrose 5 % 250 mL IVPB, 0.16 mmol/kg, Intravenous, PRN **OR** sodium phosphate 14.13 mmol in dextrose 5 % 250 mL IVPB, 0.32 mmol/kg, Intravenous, PRN  potassium chloride 20 mEq/50 mL IVPB (Central Line), 20 mEq, Intravenous, PRN  magnesium sulfate 1000 mg in dextrose 5% 100 mL IVPB, 1,000 mg, Intravenous, PRN  chlorhexidine (PERIDEX) 0.12 % solution 15 mL, 15 mL, Mouth/Throat, BID  mupirocin (BACTROBAN) 2 % ointment, , Nasal, BID  meropenem (MERREM) 500 mg IVPB (mini-bag), 500 mg, Intravenous, Q8H  sodium chloride flush 0.9 % injection 10 mL, 10 mL, Intravenous, 2 times per day  sodium chloride flush 0.9 % injection 10 mL, 10 mL, Intravenous, PRN  0.9 % sodium chloride infusion, 25 mL, Intravenous, PRN  ondansetron (ZOFRAN) injection 4 mg, 4 mg, Intravenous, Q4H PRN  polyethylene glycol (GLYCOLAX) packet 17 g, 17 g, Oral, Daily PRN  acetaminophen (TYLENOL) tablet 650 mg, 650 mg, Oral, Q4H PRN **OR** acetaminophen (TYLENOL) suppository 650 mg, 650 mg, Rectal, Q4H PRN  albuterol sulfate  (90 Base) MCG/ACT inhaler 2 puff, 2 puff, Inhalation, Q6H PRN    Data reviewed:  Labs:  CBC:   Recent Labs     04/19/21  0430 04/20/21  0449 04/20/21  1450 04/21/21  0453   WBC 15.7* 15.6*  --  12.8*   HGB 9.5* 8.6* 8.9* 8.7*   HCT 29.8* 27.3* 28.5* 27.7*   MCV 80.3 80.8  --  81.0    211  --  207     BMP:   Recent Labs 21  0430 21  0430 21  0449 21  1450 21  0453   *  --  135*  --  134*   K 3.1*   < > 3.1* 3.9 3.4*   CL 92*  --  100  --  98*   CO2 27  --  23  --  24   PHOS 2.6  --  2.6  --  3.1   BUN 22*  --  16  --  9   CREATININE 0.7*  --  <0.5*  --  <0.5*    < > = values in this interval not displayed. LIVER PROFILE:   No results for input(s): AST, ALT, LIPASE, BILIDIR, BILITOT, ALKPHOS in the last 72 hours. Invalid input(s): AMYLASE,  ALB  PT/INR: No results for input(s): PROTIME, INR in the last 72 hours. APTT:   Recent Labs     21  1833 21  2326 21  0626   APTT 52.6* 48.0* 58.7*       AB/19- 7.59/32/61 (on 70%)   - 7.47/40/67 (on 80%)    Cultures:   Blood culture (): NGTD  Blood culture (): NGTD  Urine culture (): Negative  Sputum culture (): 1+WBCs; no organisms   BAL (): Pending   Nasal MRSA probe: Negative  COVID-19: Negative      Films:  Chest images and reports were reviewed by me. My interpretation is:  CXR (21): Consolidation in BLL; ETT and CVC in place      Assessment:     Acute hypoxic/hypercarbic respiratory failure  Abnormal chest x-ray  Shock  Hyponatremia  Elevated troponin   Neuromuscular dystrophy     Plan:    Acute hypoxic/hypercarbic respiratory failure  -In the setting of neuromuscular dystrophy and bilateral lower lobe pneumonia  -Continue mechanical ventilation. Wean FiO2 as able to keep oxygen saturation>90%    Abnormal chest x-ray  -Has bilateral lower lobe pneumonia  -Continue meropenem, vancomycin and azithromycin (day #4) pending BAL cultures   -Continue Duonebs and 3% saline nebs    Shock  -Continue broad spectrum antibiotics as above  -Now off levophed     Hyponatremia  -Improved. Strict I/Os  -Check labs in a.m.     Elevated troponin   -On heparin drip  -Cardiology following    Neuromuscular dystrophy   -Neurology following      Prophylaxis  DVT- on heparin drip  GI- protonix  VAP- peridex    I spent 35 minutes of critical care time with this patient excluding procedures.     MD [facility]y [facility]

## 2021-04-22 ENCOUNTER — APPOINTMENT (OUTPATIENT)
Dept: GENERAL RADIOLOGY | Age: 56
DRG: 207 | End: 2021-04-22
Payer: MEDICARE

## 2021-04-22 LAB
ANION GAP SERPL CALCULATED.3IONS-SCNC: 7 MMOL/L (ref 3–16)
APTT: 47.6 SEC (ref 24.2–36.2)
APTT: 66 SEC (ref 24.2–36.2)
APTT: 69.4 SEC (ref 24.2–36.2)
BASE EXCESS ARTERIAL: 6.3 MMOL/L (ref -3–3)
BASOPHILS ABSOLUTE: 0 K/UL (ref 0–0.2)
BASOPHILS RELATIVE PERCENT: 0.3 %
BLOOD CULTURE, ROUTINE: NORMAL
BUN BLDV-MCNC: 8 MG/DL (ref 7–20)
CALCIUM SERPL-MCNC: 7.8 MG/DL (ref 8.3–10.6)
CARBOXYHEMOGLOBIN ARTERIAL: 1.2 % (ref 0–1.5)
CHLORIDE BLD-SCNC: 98 MMOL/L (ref 99–110)
CO2: 28 MMOL/L (ref 21–32)
CREAT SERPL-MCNC: <0.5 MG/DL (ref 0.9–1.3)
CULTURE, BLOOD 2: NORMAL
CULTURE, RESPIRATORY: NORMAL
EOSINOPHILS ABSOLUTE: 0.1 K/UL (ref 0–0.6)
EOSINOPHILS RELATIVE PERCENT: 1.8 %
GFR AFRICAN AMERICAN: >60
GFR NON-AFRICAN AMERICAN: >60
GLUCOSE BLD-MCNC: 111 MG/DL (ref 70–99)
GLUCOSE BLD-MCNC: 114 MG/DL (ref 70–99)
GLUCOSE BLD-MCNC: 132 MG/DL (ref 70–99)
GLUCOSE BLD-MCNC: 83 MG/DL (ref 70–99)
GLUCOSE BLD-MCNC: 84 MG/DL (ref 70–99)
GLUCOSE BLD-MCNC: 89 MG/DL (ref 70–99)
GRAM STAIN RESULT: NORMAL
HCO3 ARTERIAL: 31.7 MMOL/L (ref 21–29)
HCT VFR BLD CALC: 27.2 % (ref 40.5–52.5)
HEMOGLOBIN, ART, EXTENDED: 9.6 G/DL (ref 13.5–17.5)
HEMOGLOBIN: 8.7 G/DL (ref 13.5–17.5)
LYMPHOCYTES ABSOLUTE: 1.3 K/UL (ref 1–5.1)
LYMPHOCYTES RELATIVE PERCENT: 18.8 %
MAGNESIUM: 1.7 MG/DL (ref 1.8–2.4)
MCH RBC QN AUTO: 25.8 PG (ref 26–34)
MCHC RBC AUTO-ENTMCNC: 32 G/DL (ref 31–36)
MCV RBC AUTO: 80.7 FL (ref 80–100)
METHEMOGLOBIN ARTERIAL: 0.6 %
MONOCYTES ABSOLUTE: 0.6 K/UL (ref 0–1.3)
MONOCYTES RELATIVE PERCENT: 8.1 %
NEUTROPHILS ABSOLUTE: 4.9 K/UL (ref 1.7–7.7)
NEUTROPHILS RELATIVE PERCENT: 71 %
O2 CONTENT ARTERIAL: 13 ML/DL
O2 SAT, ARTERIAL: 99.1 %
O2 THERAPY: ABNORMAL
PCO2 ARTERIAL: 49.1 MMHG (ref 35–45)
PDW BLD-RTO: 15.6 % (ref 12.4–15.4)
PERFORMED ON: ABNORMAL
PERFORMED ON: ABNORMAL
PERFORMED ON: NORMAL
PH ARTERIAL: 7.42 (ref 7.35–7.45)
PHOSPHORUS: 2.5 MG/DL (ref 2.5–4.9)
PLATELET # BLD: 205 K/UL (ref 135–450)
PMV BLD AUTO: 8.5 FL (ref 5–10.5)
PO2 ARTERIAL: 115 MMHG (ref 75–108)
POTASSIUM REFLEX MAGNESIUM: 3.6 MMOL/L (ref 3.5–5.1)
RBC # BLD: 3.37 M/UL (ref 4.2–5.9)
REASON FOR REJECTION: NORMAL
REJECTED TEST: NORMAL
SODIUM BLD-SCNC: 133 MMOL/L (ref 136–145)
TCO2 ARTERIAL: 33.2 MMOL/L
WBC # BLD: 6.9 K/UL (ref 4–11)

## 2021-04-22 PROCEDURE — 2580000003 HC RX 258: Performed by: INTERNAL MEDICINE

## 2021-04-22 PROCEDURE — 6360000002 HC RX W HCPCS: Performed by: INTERNAL MEDICINE

## 2021-04-22 PROCEDURE — 2500000003 HC RX 250 WO HCPCS: Performed by: INTERNAL MEDICINE

## 2021-04-22 PROCEDURE — 84100 ASSAY OF PHOSPHORUS: CPT

## 2021-04-22 PROCEDURE — 6370000000 HC RX 637 (ALT 250 FOR IP): Performed by: INTERNAL MEDICINE

## 2021-04-22 PROCEDURE — 2500000003 HC RX 250 WO HCPCS: Performed by: NURSE PRACTITIONER

## 2021-04-22 PROCEDURE — 2000000000 HC ICU R&B

## 2021-04-22 PROCEDURE — 2700000000 HC OXYGEN THERAPY PER DAY

## 2021-04-22 PROCEDURE — 94761 N-INVAS EAR/PLS OXIMETRY MLT: CPT

## 2021-04-22 PROCEDURE — 94640 AIRWAY INHALATION TREATMENT: CPT

## 2021-04-22 PROCEDURE — 99291 CRITICAL CARE FIRST HOUR: CPT | Performed by: INTERNAL MEDICINE

## 2021-04-22 PROCEDURE — APPNB15 APP NON BILLABLE TIME 0-15 MINS: Performed by: NURSE PRACTITIONER

## 2021-04-22 PROCEDURE — 85730 THROMBOPLASTIN TIME PARTIAL: CPT

## 2021-04-22 PROCEDURE — 85025 COMPLETE CBC W/AUTO DIFF WBC: CPT

## 2021-04-22 PROCEDURE — 82803 BLOOD GASES ANY COMBINATION: CPT

## 2021-04-22 PROCEDURE — 6370000000 HC RX 637 (ALT 250 FOR IP): Performed by: NURSE PRACTITIONER

## 2021-04-22 PROCEDURE — 94003 VENT MGMT INPAT SUBQ DAY: CPT

## 2021-04-22 PROCEDURE — 6360000002 HC RX W HCPCS: Performed by: NURSE PRACTITIONER

## 2021-04-22 PROCEDURE — 80048 BASIC METABOLIC PNL TOTAL CA: CPT

## 2021-04-22 PROCEDURE — 99232 SBSQ HOSP IP/OBS MODERATE 35: CPT | Performed by: INTERNAL MEDICINE

## 2021-04-22 PROCEDURE — C9113 INJ PANTOPRAZOLE SODIUM, VIA: HCPCS | Performed by: INTERNAL MEDICINE

## 2021-04-22 PROCEDURE — 71045 X-RAY EXAM CHEST 1 VIEW: CPT

## 2021-04-22 PROCEDURE — 36592 COLLECT BLOOD FROM PICC: CPT

## 2021-04-22 PROCEDURE — 83735 ASSAY OF MAGNESIUM: CPT

## 2021-04-22 RX ORDER — CALCIUM GLUCONATE 20 MG/ML
1000 INJECTION, SOLUTION INTRAVENOUS ONCE
Status: COMPLETED | OUTPATIENT
Start: 2021-04-22 | End: 2021-04-22

## 2021-04-22 RX ORDER — POLYVINYL ALCOHOL 14 MG/ML
1 SOLUTION/ DROPS OPHTHALMIC PRN
Status: DISCONTINUED | OUTPATIENT
Start: 2021-04-22 | End: 2021-04-26 | Stop reason: HOSPADM

## 2021-04-22 RX ORDER — FUROSEMIDE 10 MG/ML
40 INJECTION INTRAMUSCULAR; INTRAVENOUS ONCE
Status: COMPLETED | OUTPATIENT
Start: 2021-04-22 | End: 2021-04-22

## 2021-04-22 RX ORDER — MAGNESIUM SULFATE IN WATER 40 MG/ML
2000 INJECTION, SOLUTION INTRAVENOUS ONCE
Status: COMPLETED | OUTPATIENT
Start: 2021-04-22 | End: 2021-04-22

## 2021-04-22 RX ORDER — HEPARIN SODIUM 1000 [USP'U]/ML
1200 INJECTION, SOLUTION INTRAVENOUS; SUBCUTANEOUS ONCE
Status: COMPLETED | OUTPATIENT
Start: 2021-04-22 | End: 2021-04-22

## 2021-04-22 RX ORDER — POTASSIUM CHLORIDE 29.8 MG/ML
20 INJECTION INTRAVENOUS ONCE
Status: COMPLETED | OUTPATIENT
Start: 2021-04-22 | End: 2021-04-22

## 2021-04-22 RX ORDER — FLUTICASONE PROPIONATE 50 MCG
2 SPRAY, SUSPENSION (ML) NASAL DAILY
Status: DISCONTINUED | OUTPATIENT
Start: 2021-04-22 | End: 2021-04-26 | Stop reason: HOSPADM

## 2021-04-22 RX ADMIN — PANTOPRAZOLE SODIUM 40 MG: 40 INJECTION, POWDER, FOR SOLUTION INTRAVENOUS at 07:47

## 2021-04-22 RX ADMIN — MAGNESIUM SULFATE HEPTAHYDRATE 2000 MG: 40 INJECTION, SOLUTION INTRAVENOUS at 10:06

## 2021-04-22 RX ADMIN — CHLORHEXIDINE GLUCONATE 15 ML: 1.2 RINSE ORAL at 07:56

## 2021-04-22 RX ADMIN — ASPIRIN 81 MG: 81 TABLET, CHEWABLE ORAL at 07:47

## 2021-04-22 RX ADMIN — MUPIROCIN: 20 OINTMENT TOPICAL at 07:47

## 2021-04-22 RX ADMIN — VANCOMYCIN HYDROCHLORIDE 750 MG: 750 INJECTION, POWDER, LYOPHILIZED, FOR SOLUTION INTRAVENOUS at 08:03

## 2021-04-22 RX ADMIN — IPRATROPIUM BROMIDE AND ALBUTEROL SULFATE 1 AMPULE: .5; 3 SOLUTION RESPIRATORY (INHALATION) at 07:44

## 2021-04-22 RX ADMIN — MUPIROCIN: 20 OINTMENT TOPICAL at 20:40

## 2021-04-22 RX ADMIN — SODIUM CHLORIDE SOLN NEBU 3% 15 ML: 3 NEBU SOLN at 11:58

## 2021-04-22 RX ADMIN — DULOXETINE HYDROCHLORIDE 30 MG: 30 CAPSULE, DELAYED RELEASE ORAL at 07:47

## 2021-04-22 RX ADMIN — Medication 10 ML: at 07:47

## 2021-04-22 RX ADMIN — CALCIUM GLUCONATE 1000 MG: 20 INJECTION, SOLUTION INTRAVENOUS at 09:48

## 2021-04-22 RX ADMIN — MEROPENEM 500 MG: 500 INJECTION, POWDER, FOR SOLUTION INTRAVENOUS at 03:51

## 2021-04-22 RX ADMIN — POTASSIUM CHLORIDE 20 MEQ: 29.8 INJECTION, SOLUTION INTRAVENOUS at 09:10

## 2021-04-22 RX ADMIN — MEROPENEM 500 MG: 500 INJECTION, POWDER, FOR SOLUTION INTRAVENOUS at 11:38

## 2021-04-22 RX ADMIN — HEPARIN SODIUM 980 UNITS/HR: 10000 INJECTION, SOLUTION INTRAVENOUS at 15:45

## 2021-04-22 RX ADMIN — IPRATROPIUM BROMIDE AND ALBUTEROL SULFATE 1 AMPULE: .5; 3 SOLUTION RESPIRATORY (INHALATION) at 11:57

## 2021-04-22 RX ADMIN — IPRATROPIUM BROMIDE AND ALBUTEROL SULFATE 1 AMPULE: .5; 3 SOLUTION RESPIRATORY (INHALATION) at 00:36

## 2021-04-22 RX ADMIN — MEROPENEM 500 MG: 500 INJECTION, POWDER, FOR SOLUTION INTRAVENOUS at 17:50

## 2021-04-22 RX ADMIN — SODIUM CHLORIDE, PRESERVATIVE FREE 10 ML: 5 INJECTION INTRAVENOUS at 20:39

## 2021-04-22 RX ADMIN — IPRATROPIUM BROMIDE AND ALBUTEROL SULFATE 1 AMPULE: .5; 3 SOLUTION RESPIRATORY (INHALATION) at 20:06

## 2021-04-22 RX ADMIN — AZITHROMYCIN MONOHYDRATE 500 MG: 500 INJECTION, POWDER, LYOPHILIZED, FOR SOLUTION INTRAVENOUS at 05:56

## 2021-04-22 RX ADMIN — SODIUM CHLORIDE SOLN NEBU 3% 15 ML: 3 NEBU SOLN at 20:07

## 2021-04-22 RX ADMIN — SODIUM CHLORIDE SOLN NEBU 3% 15 ML: 3 NEBU SOLN at 07:45

## 2021-04-22 RX ADMIN — FLUTICASONE PROPIONATE 2 SPRAY: 50 SPRAY, METERED NASAL at 16:34

## 2021-04-22 RX ADMIN — SODIUM CHLORIDE, PRESERVATIVE FREE 10 ML: 5 INJECTION INTRAVENOUS at 07:48

## 2021-04-22 RX ADMIN — PROPOFOL 10 MCG/KG/MIN: 10 INJECTION, EMULSION INTRAVENOUS at 03:51

## 2021-04-22 RX ADMIN — FUROSEMIDE 40 MG: 10 INJECTION, SOLUTION INTRAMUSCULAR; INTRAVENOUS at 08:00

## 2021-04-22 RX ADMIN — POLYVINYL ALCOHOL 1 DROP: 14 SOLUTION/ DROPS OPHTHALMIC at 20:45

## 2021-04-22 RX ADMIN — VANCOMYCIN HYDROCHLORIDE 750 MG: 750 INJECTION, POWDER, LYOPHILIZED, FOR SOLUTION INTRAVENOUS at 20:40

## 2021-04-22 RX ADMIN — METOPROLOL TARTRATE 12.5 MG: 25 TABLET, FILM COATED ORAL at 20:39

## 2021-04-22 RX ADMIN — HEPARIN SODIUM 1200 UNITS: 1000 INJECTION INTRAVENOUS; SUBCUTANEOUS at 06:53

## 2021-04-22 RX ADMIN — IPRATROPIUM BROMIDE AND ALBUTEROL SULFATE 1 AMPULE: .5; 3 SOLUTION RESPIRATORY (INHALATION) at 04:06

## 2021-04-22 RX ADMIN — IPRATROPIUM BROMIDE AND ALBUTEROL SULFATE 1 AMPULE: .5; 3 SOLUTION RESPIRATORY (INHALATION) at 16:00

## 2021-04-22 ASSESSMENT — PAIN DESCRIPTION - ORIENTATION: ORIENTATION: MID

## 2021-04-22 ASSESSMENT — PAIN DESCRIPTION - DESCRIPTORS: DESCRIPTORS: ACHING

## 2021-04-22 ASSESSMENT — PULMONARY FUNCTION TESTS
PIF_VALUE: 21
PIF_VALUE: 22
PIF_VALUE: 11
PIF_VALUE: 18
PIF_VALUE: 19

## 2021-04-22 ASSESSMENT — PAIN DESCRIPTION - PROGRESSION
CLINICAL_PROGRESSION: NOT CHANGED
CLINICAL_PROGRESSION: NOT CHANGED

## 2021-04-22 ASSESSMENT — PAIN - FUNCTIONAL ASSESSMENT: PAIN_FUNCTIONAL_ASSESSMENT: ACTIVITIES ARE NOT PREVENTED

## 2021-04-22 ASSESSMENT — PAIN DESCRIPTION - LOCATION: LOCATION: NECK

## 2021-04-22 NOTE — PROGRESS NOTES
Clinical Pharmacy Note  Heparin Dosing       Lab Results   Component Value Date    APTT 47.6 04/22/2021     Lab Results   Component Value Date    HGB 8.7 04/21/2021    HCT 27.7 04/21/2021     04/21/2021    INR 0.92 06/28/2015       Current Infusion Rate: 900 units/hr    Plan:  Bolus: 1200 units  Rate: increase to 980 units/hr  Next aPTT: 1400 4/22/21    Pharmacy will continue to monitor and adjust based on aPTT results.   Karlene Wang, HildaD

## 2021-04-22 NOTE — PLAN OF CARE
Problem: Non-Violent Restraints  Goal: Removal from restraints as soon as assessed to be safe  Outcome: Ongoing  Bilateral wrist restraints in place. Skin CDI. Problem: Falls - Risk of:  Goal: Will remain free from falls  Description: Will remain free from falls  Outcome: Ongoing  Falling star program remains in place. Call light and personal belongings within reach. Frequent visual monitoring continues. Toileting program inplace. Patient assisted in turning/repositioning at least once every 2 hours, and on a prn basis. Problem: Fluid Volume - Imbalance:  Goal: Absence of imbalanced fluid volume signs and symptoms  Description: Absence of imbalanced fluid volume signs and symptoms  Outcome: Ongoing  IV lasix administered. Problem: Nutrition  Goal: Optimal nutrition therapy  Outcome: Ongoing   Tolerating tube feeds.

## 2021-04-22 NOTE — CONSULTS
[facility]  Palliative Care   Consult Note    NAME:  Tierra Gonzalez  MEDICAL RECORD NUMBER:  [id number]  AGE: 54 y.o. GENDER: male  : 1965  TODAY'S DATE:  2021    Subjective     Reason for Consult:  goals of care  Visit Type: Initial Consult      Tierra Gonzalez is a 54 y.o. male referred by:   [x] Physician    PAST MEDICAL HISTORY      Diagnosis Date    Anxiety     COPD, moderate ([address]     Erectile dysfunction of organic origin     Essential hypertension     GERD without esophagitis     Hyperglycemia     [facility]     Narcotic abuse ([facility] [address] [facility] 2016    Suboxone on drug screen    Obstructive sleep apnea     BIPAP    Scoliosis        PAST SURGICAL HISTORY  Past Surgical History:   Procedure Laterality Date    ACHILLES TENDON SURGERY      both legs    CHOLECYSTECTOMY      INTUBATION  2021            FAMILY HISTORY  Family History   Adopted: Yes   Problem Relation Age of Onset    Asthma Mother     Alcohol Abuse Mother     Heart Disease Father         MI    Asthma Maternal Aunt     High Blood Pressure Maternal Aunt     Diabetes Maternal Uncle     High Blood Pressure Maternal Uncle     Stroke Maternal Grandmother     Cancer Maternal Grandmother         Colon       SOCIAL HISTORY  Social History     Tobacco Use    Smoking status: Never Smoker    Smokeless tobacco: Never Used   Substance Use Topics    Alcohol use: No    Drug use: Yes     Types: Marijuana       ALLERGIES  Allergies   Allergen Reactions    No Known Allergies        MEDICATIONS  No current facility-administered medications on file prior to encounter.       Current Outpatient Medications on File Prior to Encounter   Medication Sig Dispense Refill    doxazosin (CARDURA) 1 MG tablet Take 1 tablet by mouth daily 30 tablet 3    DULoxetine (CYMBALTA) 30 MG extended release capsule Take 1 capsule by mouth 3 times daily 270 capsule 1    tamsulosin (FLOMAX) 0.4 MG capsule Take 2 capsules by mouth daily 60 capsule 3    pantoprazole (PROTONIX) 40 MG tablet TAKE 1 TABLET BY MOUTH EVERY DAY 90 tablet 1    lisinopril (PRINIVIL;ZESTRIL) 20 MG tablet TAKE 1 TABLET BY MOUTH DAILY 90 tablet 1    metFORMIN (GLUCOPHAGE) 500 MG tablet Take 1 tablet by mouth daily (with breakfast) 30 tablet 1    aspirin 81 MG chewable tablet Take 1 tablet by mouth daily 30 tablet 0    vitamin C (ASCORBIC ACID) 500 MG tablet Take 500 mg by mouth daily      vitamin D3 (CHOLECALCIFEROL) 400 units TABS tablet Take 400 Units by mouth daily      albuterol sulfate HFA (PROAIR HFA) 108 (90 BASE) MCG/ACT inhaler INHALE 2 PUFFS INTO THE MOUTH EVERY 4 HOURS AS NEEDED FOR WHEEZING OR SHORTNESS OF BREATH 8.5 g 0    Multiple Vitamins-Minerals (MULTIVITAMIN & MINERAL PO) Take 1 tablet by mouth daily       vitamin B-12 (CYANOCOBALAMIN) 1000 MCG tablet Take 1,000 mcg by mouth daily. Objective         BP (!) 146/89   Pulse 114   Temp 98.4 °F (36.9 °C) (Axillary)   Resp 18   Ht 5' (1.524 m)   Wt 125 lb (56.7 kg)   SpO2 99%   BMI 24.41 kg/m²     Code Status: Full Code    Advanced Directives: not completed     Assessment        Management and Education    Persons available for education:        [] Self     [] Caregiver       [] Spouse       [x] Other Family Member   []  Other    Spiritual History:  notified: Yes,     Does the patient have a Primary Care Physician?   Yes    Level of patient/caregiver understanding able to:        [x] Verbalize Understanding   [] Demonstrate Understanding       [] Teach Back       [] Needs Reinforcement     []  Other:      Teaching Time:  0hours  30 minutes     Plan        Social Service Consult Made:  Yes  Assistance filling out Living Will/HPOA:  No      Discharge Plan:  Education/support to family  Providing support for coping/adaptation/distress of family  Palliative care orders introduced    Discharge Environment:  [] Hospice Consult Agency:  [] Inpatient Hospice    [] Home with Hospice Care   [] ECF with Hospice  [] ECF skilled care with Hospice to follow   [] Other:    Discussion:  Patient admitted for resp failure intubated but now off vent on bipap. He has been having anxiety per his nurse and mom. His mother requested I not wake him. I did discuss goals with his mom she states he does not want to be on life support for extended period of time. We have planned to meet tomorrow morning to discuss goals of care. I will continue to follow Mr. Ryanne Davila care as needed. Thank you for allowing me to participate in the care of Mr. Lemuel Cardenas .      Electronically signed by Jose Bauman RN, [facility] on 4/22/2021 at 4:55 PM  [address] [place] [address]  Office: [phone]

## 2021-04-22 NOTE — PROGRESS NOTES
[facility]   Daily Progress Note      Admit Date:  4/17/2021      Subjective:   Mr. Duy Haque is a 51yo male with a past medical history significant for COPD and essential hypertension who presented to the [facility] ED via squad with respiratory failure. He is felt to have some mucous plugging and overnight required an increase in his FiO2 to 80%. This is been weaned down to 60% now. We were asked to evaluate him for elevated troponin assays found incidentally along with some ECG changes.     Interval history:  Yola Beltrán was extubated to BiPAP ventilation this morning. No events overnight. Sinus rhythm on telemetry. He has no complaints of chest pain. He is somewhat lethargic but answers questions appropriately. Objective:     /70   Pulse 91   Temp 98.4 °F (36.9 °C) (Axillary)   Resp (!) 33   Ht 5' (1.524 m)   Wt 125 lb (56.7 kg)   SpO2 97%   BMI 24.41 kg/m²      Intake/Output Summary (Last 24 hours) at 4/22/2021 1242  Last data filed at 4/22/2021 1140  Gross per 24 hour   Intake 1454 ml   Output 4150 ml   Net -2696 ml       Physical Exam:  General:  Awake, alert, NAD  Skin:  Warm and dry  Neck:  JVD<8, no carotid bruits  Chest: On BiPAP.   Diminished bilaterally no wheezes/rhonchi/rales  Cardiovascular:  RRR, normal S1/S2, no M/R/G  Abdomen:  Soft, nontender, +bowel sounds  Extremities:  No edema  Pulses: 2+ bilat carotid    2+ bilat radial    2+ bilat femoral        Medications:    meropenem  500 mg Intravenous Q6H    vancomycin  750 mg Intravenous Q12H    ipratropium-albuterol  1 ampule Inhalation Q4H    sodium chloride (Inhalant)  15 mL Nebulization TID    metoprolol tartrate  12.5 mg Per NG tube BID    aspirin  81 mg Per NG tube Daily    DULoxetine  30 mg Oral Daily    vancomycin (VANCOCIN) intermittent dosing (placeholder)   Other RX Placeholder    mupirocin   Nasal BID    sodium chloride flush  10 mL Intravenous 2 times per day      heparin (PORCINE) Infusion 900 Units/hr (04/21/21 0923)    sodium chloride         Lab Data:  CBC:   Recent Labs     04/20/21  0449 04/20/21  1450 04/21/21  0453 04/22/21  0527   WBC 15.6*  --  12.8* 6.9   HGB 8.6* 8.9* 8.7* 8.7*     --  207 205     BMP:    Recent Labs     04/20/21  0449 04/20/21  0449 04/21/21  0453 04/21/21  1301 04/22/21  0527   *  --  134*  --  133*   K 3.1*   < > 3.4* 4.4 3.6   CO2 23  --  24  --  28   BUN 16  --  9  --  8   CREATININE <0.5*  --  <0.5*  --  <0.5*    < > = values in this interval not displayed. Recent Labs     04/21/21  0626 04/21/21  1418 04/22/21  0527   APTT 58.7* 55.6* 47.6*     BNP:  No results for input(s): BNP in the last 72 hours. CARDIAC ENZYMES:  No results for input(s): CKMB, CKMBINDEX, TROPONINI in the last 72 hours. Invalid input(s): CKTOTAL;3  FASTING LIPID PANEL:  Lab Results   Component Value Date    CHOL 163 08/26/2019    HDL 75 08/26/2019    TRIG 75 08/26/2019     Echo 4/19/2021:  Ejection fraction is visually estimated to be 55-60%. Normal diastolic function. Severly dilated right ventricle. Right ventricular systolic function is normal   No significant valvular heart disease      Assessment:  1. Non-ST elevation MI  2. Acute respiratory failure with hypoxia      Plan:   Liv Talbot is stable at present. He has been extubated to BiPAP ventilation. His respiratory difficulty may be neuromuscular in addition to the mucous plugging. I would maintain him on the IV heparin drip and add aspirin and beta-blockade to his regimen. I think his respiratory status will dictate what we do in terms of an ischemic evaluation but it could be that a Lexiscan stress perfusion would be the best option. I would not do anything right now as he is on BiPAP ventilation. His right ventricle was quite dilated on the echocardiogram this could be from his lung disease acutely as the function was preserved.           Signed:  Neelam Hampton MD

## 2021-04-22 NOTE — PROGRESS NOTES
Comprehensive Nutrition Assessment    Type and Reason for Visit:  Reassess    Nutrition Recommendations/Plan:   1. Continue TF regimen if deemed appropriate  2. Monitor for advancement of diet    Nutrition Assessment:  Reasses. Pt admitted to ED with complaints of SOB w/ some chest pain. PMHx includes muscular dystrophy, GERD, hyperglycemia, and COPD. Originally pt places on bipap, but was not able to tolerate and was removing. Pt was intubated 4/20. Plans to be extubated to bipap today. TF currently off. Swallow eval placed. He is alert and follows commands. Nutrition will continue to follow. Malnutrition Assessment:  Malnutrition Status:  Insufficient data      Estimated Daily Nutrient Needs:  Energy (kcal):  7884-7585 kcal/day (25-30 kcal/kg CBW)  Protein (g):  54-90 g/day (1.2-2 g/kg CBW)    Fluid (ml/day):  per provider    Nutrition Related Findings:  Na 133, Mg 1.7, glucose 111. BM 4/17      Wounds:  None       Current Nutrition Therapies:    No diet orders on file  · Goal TF & Flush Orders Provides: Start Vital 1.2 (Semi-elemental) with goal rate of 45 ml/hr +1 protinex daily. TF regimen provides 900 ml TV, 1184 kcal, 94 g protein, 730 ml free fluid      Anthropometric Measures:  · Height: 5' (152.4 cm)  · Current Body Weight: 99 lb (44.9 kg)   · Admission Body Weight: 97 lb (44 kg)   · Ideal Body Weight: 106 lbs; % Ideal Body Weight 93.4 %   · BMI: 19.3  · BMI Categories: Normal Weight (BMI 18.5-24. 9)       Nutrition Diagnosis:   · Inadequate oral intake related to impaired respiratory function as evidenced by NPO or clear liquid status due to medical condition, intubation      Nutrition Interventions:   Food and/or Nutrient Delivery:  Continue NPO  Nutrition Education/Counseling:  No recommendation at this time   Coordination of Nutrition Care:  Continue to monitor while inpatient    Goals:   Tolerate TF regimen at goal rate       Nutrition Monitoring and Evaluation:   Behavioral-Environmental Outcomes: None Identified   Food/Nutrient Intake Outcomes:  Enteral Nutrition Intake/Tolerance  Physical Signs/Symptoms Outcomes:  Biochemical Data, Chewing or Swallowing, Hemodynamic Status, Nutrition Focused Physical Findings, Skin, Weight     Discharge Planning:     Too soon to determine     Electronically signed by Pravin Chou on 4/22/21 at 10:00 AM EDT    Contact: [phone]

## 2021-04-22 NOTE — PROGRESS NOTES
Hospitalist Progress Note      PCP: DELISA Leung CNP    Date of Admission: 4/17/2021    Chief Complaint: SOB    Hospital Course: Patient is a 43-year-old male with a past medical history of muscular dystrophy, COPD, hypertension who presented to the hospital with shortness of breath. Initially on nasal cannula and then requiring BiPAP. Patient refusing BiPAP and eventually rapid response called and patient was intubated. 4/18 Patient currently intubated and sedated on pressors. Per family patient's muscular dystrophy has been progressing over the past 6 months including his pulmonary and myocardial tissues. Mother and aunt at bedside and discussed long road ahead in regards to getting him off the ventilator if he has advanced muscular dystrophy    4/19 Patient awake alert on the vent to later today. Patient is following commands. Discussed that we will try and continue to wean him off the ventilator but this may be an issue with his history of muscular dystrophy. He is currently now on only Levophed and we will continue to titrate this off.    4/21  Patient still requiring the ventilator with 60% FiO2 and a PEEP of 5. Continue to titrate off ventilator in hopes of possible spontaneous breathing trial.  Patient underwent bronchoscopy on April 20 with bilateral lower lobe pneumonia. Subjective: Patient seen and examined. Patient tolerating spontaneous breathing trial with plan to extubate later today.     Medications:  Reviewed    Infusion Medications    fentaNYL Stopped (04/22/21 0358)    propofol Stopped (04/22/21 0748)    heparin (PORCINE) Infusion 900 Units/hr (04/21/21 0916)    sodium chloride       Scheduled Medications    magnesium sulfate  2,000 mg Intravenous Once    potassium chloride  20 mEq Intravenous Once    calcium gluconate-NaCl  1,000 mg Intravenous Once    vancomycin  750 mg Intravenous Q12H    ipratropium-albuterol  1 ampule Inhalation Q4H    sodium chloride (Inhalant)  15 mL Nebulization TID    metoprolol tartrate  12.5 mg Per NG tube BID    aspirin  81 mg Per NG tube Daily    DULoxetine  30 mg Oral Daily    azithromycin  500 mg Intravenous Q24H    vancomycin (VANCOCIN) intermittent dosing (placeholder)   Other RX Placeholder    pantoprazole  40 mg Intravenous Daily    And    sodium chloride (PF)  10 mL Intravenous Daily    chlorhexidine  15 mL Mouth/Throat BID    mupirocin   Nasal BID    meropenem  500 mg Intravenous Q8H    sodium chloride flush  10 mL Intravenous 2 times per day     PRN Meds: sodium phosphate IVPB **OR** sodium phosphate IVPB, fentanNYL, potassium chloride, magnesium sulfate, sodium chloride flush, sodium chloride, ondansetron, polyethylene glycol, acetaminophen **OR** acetaminophen, albuterol sulfate HFA      Intake/Output Summary (Last 24 hours) at 4/22/2021 0832  Last data filed at 4/22/2021 0755  Gross per 24 hour   Intake 1354 ml   Output 1275 ml   Net 79 ml       Physical Exam Performed:    /61   Pulse 80   Temp 97.9 °F (36.6 °C) (Oral)   Resp 14   Ht 5' (1.524 m)   Wt 125 lb (56.7 kg)   SpO2 94%   BMI 24.41 kg/m²     General appearance:  Intubated and sedated  HEENT: Pupils equal, round, and reactive to light. Conjunctivae/corneas clear. Neck: Supple, with full range of motion. No jugular venous distention. Trachea midline. Respiratory:  Normal respiratory effort. Diminished breath sounds bilateral cardiovascular: Regular rate and rhythm with normal S1/S2 without murmurs, rubs or gallops. Abdomen: Soft, non-tender, non-distended with normal bowel sounds. Musculoskeletal: No clubbing, cyanosis or edema bilaterally. Skin: Skin color, texture, turgor normal.  No rashes or lesions.   Neurologic: Intubated and sedated  Psychiatric: Intubated and sedated  Capillary Refill: Brisk,< 3 seconds   Peripheral Pulses: +2 palpable, equal bilaterally       Labs:   Recent Labs     04/20/21  0449 04/20/21  1450 04/21/21  0453 04/22/21  0527   WBC 15.6*  --  12.8* 6.9   HGB 8.6* 8.9* 8.7* 8.7*   HCT 27.3* 28.5* 27.7* 27.2*     --  207 205     Recent Labs     04/20/21  0449 04/20/21  0449 04/21/21  0453 04/21/21  1301 04/22/21  0527   *  --  134*  --  133*   K 3.1*   < > 3.4* 4.4 3.6     --  98*  --  98*   CO2 23  --  24  --  28   BUN 16  --  9  --  8   CREATININE <0.5*  --  <0.5*  --  <0.5*   CALCIUM 7.8*  --  7.8*  --  7.8*   PHOS 2.6  --  3.1  --  2.5    < > = values in this interval not displayed. No results for input(s): AST, ALT, BILIDIR, BILITOT, ALKPHOS in the last 72 hours. No results for input(s): INR in the last 72 hours. Recent Labs     04/19/21  1000   TROPONINI 0.15*       Urinalysis:      Lab Results   Component Value Date    NITRU Negative 04/18/2021    WBCUA 10 04/18/2021    BACTERIA 3+ 11/30/2014    RBCUA 45 04/18/2021    BLOODU MODERATE 04/18/2021    SPECGRAV >1.030 04/18/2021    GLUCOSEU 250 04/18/2021    GLUCOSEU NEGATIVE 03/19/2011       Radiology:  XR CHEST PORTABLE   Final Result   1. Stable volume of mild-to-moderate bilateral layering pleural effusions. 2. Mild worsening of bilateral perihilar ill-defined consolidation most   suggestive of alveolar edema. 3. Moderate cardiomegaly. 4. Recommend retraction of endotracheal tube 2.5 cm. XR CHEST PORTABLE   Final Result   Stable perihilar edema with bilateral pleural effusions and bibasilar volume   loss. Enteric catheter tip in the distal gastric body or gastric antrum just below   the lower margin of the image. XR CHEST PORTABLE   Final Result   Increasing bilateral lower lobe airspace disease with volume loss concerning   for mucous plugging and lower lobe collapse. Pneumonia also considered. The findings were sent to the Radiology Results [address] at 7:02   am on 4/20/2021to be communicated to a licensed caregiver. XR CHEST PORTABLE   Final Result   1.  Appropriate positioning of endotracheal tube. 2. Mild cardiomegaly. 3. Appropriate radiographic positioning of intervally placed left internal   jugular approach central venous catheter. XR CHEST PORTABLE   Final Result   Endotracheal tube in satisfactory position. Mild/moderate cardiomegaly. Prominent gaseous distension of the small and large bowel. CT CHEST PULMONARY EMBOLISM W CONTRAST   Final Result   Linear atelectasis versus scarring right middle lobe. No evidence for acute   infiltrates. This study is not optimized for evaluation of the pulmonary arteries. There are no central filling defects however the possibly of small distal   pulmonary emboli cannot be totally excluded         XR CHEST PORTABLE   Final Result   Moderate cardiomegaly without pulmonary vascular congestion. Mild bibasilar   atelectasis worse on the left. Some underlying scarring also suspected. Assessment/Plan:    Acute on chronic hypercapnic respiratory failure  Likely secondary to muscular dystrophy  Continue ventilatory support  Concern that this will be difficult to extubate  Extubate today to BiPAP    Muscular dystrophy  Neurology consulted  Overall poor prognosis as there has been progression in the past 1 year    Shock  Possibly secondary to sedation and ventilation  Continue with broad-spectrum antibiotics  Off all pressors    KOSTAS  Creatinine remained stable  Continue IV fluids    Elevated troponin  Denies any chest pain  Serial troponin stable   echo with normal ejection fraction of 55 to 60%, severely dilated right ventricle  Plan for stress test versus other ischemic work-up in the near future    Hypokalemia  Replete and recheck      DVT Prophylaxis: Lovenox  Diet: DIET TUBE FEED CONTINUOUS/CYCLIC NPO; Semi-elemental (Vital 1.2);  Orogastric; 20; 45  Code Status: Full Code    PT/OT Eval Status: Not applicable     Dispo - ICU    Bill Yousif MD

## 2021-04-22 NOTE — PROGRESS NOTES
Clinical Pharmacy Note  Vancomycin Consult    Cheri Davey is a 54 y.o. male ordered Vancomycin for pneumonia; consult received from Dr. Cabrera Jones to manage therapy. Also receiving meropenem and azithromycin. Patient Active Problem List   Diagnosis    Scoliosis    [facility]    Narcotic abuse ([facility]    Anxiety    Essential hypertension    Obstructive sleep apnea    COPD, moderate ([facility]    GERD without esophagitis    Erectile dysfunction of organic origin    Nausea & vomiting    Diarrhea    Generalized weakness    Hyponatremia    Hypochloremia    Hyperglycemia    Pneumonia    Acute respiratory failure with hypoxia (HCC)    Elevated troponin    Acute respiratory failure with hypoxia and hypercapnia (HCC)    Abnormal chest x-ray    Shock ([facility]       Allergies:  No known allergies     Temp max:  Temp (24hrs), Av.8 °F (36.6 °C), Min:97 °F (36.1 °C), Max:98.3 °F (36.8 °C)      Recent Labs     21  0430 21  0449 21  0453   WBC 15.7* 15.6* 12.8*       Recent Labs     21  0430 21  0449 21  0453   BUN 22* 16 9   CREATININE 0.7* <0.5* <0.5*         Intake/Output Summary (Last 24 hours) at 2021  Last data filed at 2021  Gross per 24 hour   Intake 1805.8 ml   Output 1525 ml   Net 280.8 ml       Ht Readings from Last 1 Encounters:   21 5' (1.524 m)        Wt Readings from Last 1 Encounters:   21 101 lb 10.1 oz (46.1 kg)         Assessment:  Day # 4 of vancomycin  Current regimen: Vancomycin 500 mg IV every 12 hours   Trough today: 10.7 mg/L (goal 15-20 mg/L for pneumonia)    Plan:  Increase to vancomycin 750 mg IV every 12 hours, which predicts a trough of 16 mg/L  Dose this evening already started  Next trough 21 at 2000    Thank you for the consult.      Saran Mauro, PharmD  2021 9:30 PM

## 2021-04-22 NOTE — PROGRESS NOTES
Pulmonary Progress Note    CC: Acute hypoxic/hypercarbic respiratory failure  Abnormal chest x-ray  Shock  Hyponatremia  Elevated troponin   Neuromuscular dystrophy     24 hr events: On SBT this morning. Had 1.5L of urine output in the past 24 hours. PHYSICAL EXAM:  Blood pressure 102/61, pulse 81, temperature 97.9 °F (36.6 °C), temperature source Oral, resp. rate 14, height 5' (1.524 m), weight 125 lb (56.7 kg), SpO2 94 %. on PS 5/5, 40%      Intake/Output Summary (Last 24 hours) at 4/22/2021 0745  Last data filed at 4/22/2021 3294  Gross per 24 hour   Intake 1234 ml   Output 1725 ml   Net -491 ml       Gen: Well developed; well nourished  Eyes: No scleral icterus. No conjunctival injection. ENT:  Oropharynx with ETT. External appearance of ears and nose normal.  Neck: Trachea midline. No obvious mass. No visible thyroid enlargement    Respiratory: Decreased breath sounds over right lower lung zone, no accessory muscle use  Cardiovascular: Regular rate and rhythm, no appreciable murmurs. No edema. Skin: Warm and dry. No rashes or ulcers on visible areas. Normal texture and turgor  Musculoskeletal: No cyanosis, clubbing or joint deformity.     Psychiatric: Intubated, but awake and alert     Medications:  Current Facility-Administered Medications: sodium phosphate 9.06 mmol in dextrose 5 % 250 mL IVPB, 0.16 mmol/kg, Intravenous, PRN **OR** sodium phosphate 18.15 mmol in dextrose 5 % 250 mL IVPB, 0.32 mmol/kg, Intravenous, PRN  vancomycin (VANCOCIN) 750 mg in dextrose 5 % 250 mL IVPB, 750 mg, Intravenous, Q12H  fentaNYL 10 mcg/mL infusion, 12.5-200 mcg/hr, Intravenous, Continuous  fentaNYL (SUBLIMAZE) injection 25 mcg, 25 mcg, Intravenous, Q1H PRN  ipratropium-albuterol (DUONEB) nebulizer solution 1 ampule, 1 ampule, Inhalation, Q4H  sodium chloride (Inhalant) 3 % nebulizer solution 15 mL, 15 mL, Nebulization, TID  metoprolol tartrate (LOPRESSOR) tablet 12.5 mg, 12.5 mg, Per NG tube, BID  aspirin chewable tablet 81 mg, 81 mg, Per NG tube, Daily  DULoxetine (CYMBALTA) extended release capsule 30 mg, 30 mg, Oral, Daily  propofol injection, 5-50 mcg/kg/min, Intravenous, Titrated  azithromycin (ZITHROMAX) 500 mg in D5W 250ml addavial, 500 mg, Intravenous, Q24H  vancomycin (VANCOCIN) intermittent dosing (placeholder), , Other, RX Placeholder  heparin 25,000 unit in sodium chloride 0.45% 250 mL (premix) infusion, 0-3,000 Units/hr, Intravenous, Continuous  pantoprazole (PROTONIX) injection 40 mg, 40 mg, Intravenous, Daily **AND** sodium chloride (PF) 0.9 % injection 10 mL, 10 mL, Intravenous, Daily  potassium chloride 20 mEq/50 mL IVPB (Central Line), 20 mEq, Intravenous, PRN  magnesium sulfate 1000 mg in dextrose 5% 100 mL IVPB, 1,000 mg, Intravenous, PRN  chlorhexidine (PERIDEX) 0.12 % solution 15 mL, 15 mL, Mouth/Throat, BID  mupirocin (BACTROBAN) 2 % ointment, , Nasal, BID  meropenem (MERREM) 500 mg IVPB (mini-bag), 500 mg, Intravenous, Q8H  sodium chloride flush 0.9 % injection 10 mL, 10 mL, Intravenous, 2 times per day  sodium chloride flush 0.9 % injection 10 mL, 10 mL, Intravenous, PRN  0.9 % sodium chloride infusion, 25 mL, Intravenous, PRN  ondansetron (ZOFRAN) injection 4 mg, 4 mg, Intravenous, Q4H PRN  polyethylene glycol (GLYCOLAX) packet 17 g, 17 g, Oral, Daily PRN  acetaminophen (TYLENOL) tablet 650 mg, 650 mg, Oral, Q4H PRN **OR** acetaminophen (TYLENOL) suppository 650 mg, 650 mg, Rectal, Q4H PRN  albuterol sulfate  (90 Base) MCG/ACT inhaler 2 puff, 2 puff, Inhalation, Q6H PRN    Data reviewed:  Labs:  CBC:   Recent Labs     04/20/21  0449 04/20/21  1450 04/21/21  0453 04/22/21  0527   WBC 15.6*  --  12.8* 6.9   HGB 8.6* 8.9* 8.7* 8.7*   HCT 27.3* 28.5* 27.7* 27.2*   MCV 80.8  --  81.0 80.7     --  207 205     BMP:   Recent Labs     04/20/21  0449 04/20/21  0449 04/21/21  0453 04/21/21  1301 04/22/21  0527   *  --  134*  --  133*   K 3.1*   < > 3.4* 4.4 3.6     --  98*  --  98* CO2 23  --  24  --  28   PHOS 2.6  --  3.1  --  2.5   BUN 16  --  9  --  8   CREATININE <0.5*  --  <0.5*  --  <0.5*    < > = values in this interval not displayed. LIVER PROFILE:   No results for input(s): AST, ALT, LIPASE, BILIDIR, BILITOT, ALKPHOS in the last 72 hours. Invalid input(s): AMYLASE,  ALB  PT/INR: No results for input(s): PROTIME, INR in the last 72 hours. APTT:   Recent Labs     21  0626 21  1418 21  0527   APTT 58.7* 55.6* 47.6*       AB/19- 7.59/32/61 (on 70%)   - 7.47/40/ (on 80%)    Cultures:   Blood culture (): Negative  Blood culture (): NGTD  Urine culture (): Negative  Sputum culture (): Normal angela   BAL (): Pending   Nasal MRSA probe: Negative  COVID-19: Negative      Films:  Chest images and reports were reviewed by me. My interpretation is:  CXR (21): Vascular congestion; stable consolidation in BLL; ETT, CVC and gastric tube in place      Assessment:     Acute hypoxic/hypercarbic respiratory failure  Abnormal chest x-ray  Shock  Hyponatremia  Elevated troponin   Neuromuscular dystrophy     Plan:    Acute hypoxic/hypercarbic respiratory failure  -In the setting of neuromuscular dystrophy and bilateral lower lobe pneumonia  -Give lasix 40mg IV x1  -On SBT. Will extubate to BiPAP    Abnormal chest x-ray  -Has bilateral lower lobe pneumonia  -Continue meropenem, vancomycin and azithromycin (day #5) -Continue Duonebs and 3% saline nebs    Shock  -Continue broad spectrum antibiotics as above  -Off levophed     Hyponatremia  -Strict I/Os  -Check daily labs     Elevated troponin   -On heparin drip  -Cardiology following    Neuromuscular dystrophy   -Neurology following      Prophylaxis  DVT- on heparin drip  GI- protonix  VAP- peridex    I spent 35 minutes of critical care time with this patient excluding procedures.     Hay Mo MD  [facility] Pulmonary, Critical Care and Sleep

## 2021-04-22 NOTE — PROGRESS NOTES
0715: Handoff completed with Lisha Webster RN. Patient intubated, on propofol. VSS on monitor. Arterial line in place. Follows commands appropriately. 0750: Propofol stopped. 9396: SBT started. 0910: ABG sent. 1000: Rounds completed with Dr. Francy Cooper and patient extubated to bipap by RT. Sats 99%. 1200: Bipap removed and placed on 5L NC.  1400: Mother at bedside. Updates provided. 1645: Placed back on bipap after c/o feeling anxious. Also reports home dose of cymbalta is 90mg. Dr. Everette Fuller notified and dose increased. 1800: Asleep on bipap. VSS on monitor.

## 2021-04-22 NOTE — PROGRESS NOTES
Speech Language Pathology    10:15 AM:  Swallow evaluation order received. Patient noted to have been extubated to BiPAP around 10 AM this date after being initially intubated 4/18/2021. ST to hold swallowing evaluation at this time d/t current respiratory status. Considering patient's current respiratory status and h/o muscular dystrophy, patient may benefit from holding swallow evaluation until a later date to confirm consistent stable respiratory status prior to PO trial administrations. ST to follow-up with RN later this date with plan to proceed with swallow evaluation when deemed most appropriate. 2:15 PM: Patient discussed with RN and Pulmonologist (Dr. Clair YOUNGBLOOD [facility]). Patient tolerating 5L O2 via NC since noon s/p extubation to BiPAP at 10 AM d/t concern for potential decreased extubation tolerance. Patient with reported significant decline (r/t Muscular Dystrophy) over the last 6 months. Patient with improving respiratory status since extubation; RN and Pulmonologist in agreement with AM swallowing evaluation to permit for continued respiratory gains prior to PO trials with ST.  ST to complete swallow evaluation 4/23/2021 AM unless otherwise notified. Thank you. Gretchen Hardy, [address] [facility], [id number]  Speech-Language Pathologist  Portable phone: [phone]

## 2021-04-23 ENCOUNTER — APPOINTMENT (OUTPATIENT)
Dept: GENERAL RADIOLOGY | Age: 56
DRG: 207 | End: 2021-04-23
Payer: MEDICARE

## 2021-04-23 LAB
ANION GAP SERPL CALCULATED.3IONS-SCNC: 7 MMOL/L (ref 3–16)
APTT: 68.9 SEC (ref 24.2–36.2)
BASOPHILS ABSOLUTE: 0.1 K/UL (ref 0–0.2)
BASOPHILS RELATIVE PERCENT: 1 %
BUN BLDV-MCNC: 6 MG/DL (ref 7–20)
CALCIUM SERPL-MCNC: 8.4 MG/DL (ref 8.3–10.6)
CHLORIDE BLD-SCNC: 96 MMOL/L (ref 99–110)
CO2: 35 MMOL/L (ref 21–32)
CREAT SERPL-MCNC: <0.5 MG/DL (ref 0.9–1.3)
EOSINOPHILS ABSOLUTE: 0.2 K/UL (ref 0–0.6)
EOSINOPHILS RELATIVE PERCENT: 2.8 %
GFR AFRICAN AMERICAN: >60
GFR NON-AFRICAN AMERICAN: >60
GLUCOSE BLD-MCNC: 74 MG/DL (ref 70–99)
GLUCOSE BLD-MCNC: 83 MG/DL (ref 70–99)
GLUCOSE BLD-MCNC: 89 MG/DL (ref 70–99)
GLUCOSE BLD-MCNC: 90 MG/DL (ref 70–99)
GLUCOSE BLD-MCNC: 90 MG/DL (ref 70–99)
HCT VFR BLD CALC: 30.6 % (ref 40.5–52.5)
HEMOGLOBIN: 9.7 G/DL (ref 13.5–17.5)
LYMPHOCYTES ABSOLUTE: 1.4 K/UL (ref 1–5.1)
LYMPHOCYTES RELATIVE PERCENT: 18.1 %
MAGNESIUM: 1.6 MG/DL (ref 1.8–2.4)
MCH RBC QN AUTO: 25.7 PG (ref 26–34)
MCHC RBC AUTO-ENTMCNC: 31.8 G/DL (ref 31–36)
MCV RBC AUTO: 80.6 FL (ref 80–100)
MONOCYTES ABSOLUTE: 0.7 K/UL (ref 0–1.3)
MONOCYTES RELATIVE PERCENT: 9.6 %
NEUTROPHILS ABSOLUTE: 5.1 K/UL (ref 1.7–7.7)
NEUTROPHILS RELATIVE PERCENT: 68.5 %
PDW BLD-RTO: 15.6 % (ref 12.4–15.4)
PERFORMED ON: NORMAL
PHOSPHORUS: 3.2 MG/DL (ref 2.5–4.9)
PLATELET # BLD: 287 K/UL (ref 135–450)
PMV BLD AUTO: 8.2 FL (ref 5–10.5)
POTASSIUM REFLEX MAGNESIUM: 3.8 MMOL/L (ref 3.5–5.1)
PROCALCITONIN: 0.16 NG/ML (ref 0–0.15)
RBC # BLD: 3.79 M/UL (ref 4.2–5.9)
SODIUM BLD-SCNC: 138 MMOL/L (ref 136–145)
TROPONIN: 0.03 NG/ML
VANCOMYCIN TROUGH: 12.4 UG/ML (ref 10–20)
WBC # BLD: 7.5 K/UL (ref 4–11)

## 2021-04-23 PROCEDURE — 85730 THROMBOPLASTIN TIME PARTIAL: CPT

## 2021-04-23 PROCEDURE — 6360000002 HC RX W HCPCS: Performed by: INTERNAL MEDICINE

## 2021-04-23 PROCEDURE — 2580000003 HC RX 258: Performed by: NURSE PRACTITIONER

## 2021-04-23 PROCEDURE — 6370000000 HC RX 637 (ALT 250 FOR IP): Performed by: INTERNAL MEDICINE

## 2021-04-23 PROCEDURE — 2580000003 HC RX 258: Performed by: INTERNAL MEDICINE

## 2021-04-23 PROCEDURE — 84484 ASSAY OF TROPONIN QUANT: CPT

## 2021-04-23 PROCEDURE — 99233 SBSQ HOSP IP/OBS HIGH 50: CPT | Performed by: INTERNAL MEDICINE

## 2021-04-23 PROCEDURE — 92610 EVALUATE SWALLOWING FUNCTION: CPT

## 2021-04-23 PROCEDURE — 80202 ASSAY OF VANCOMYCIN: CPT

## 2021-04-23 PROCEDURE — 92611 MOTION FLUOROSCOPY/SWALLOW: CPT

## 2021-04-23 PROCEDURE — 94761 N-INVAS EAR/PLS OXIMETRY MLT: CPT

## 2021-04-23 PROCEDURE — 83735 ASSAY OF MAGNESIUM: CPT

## 2021-04-23 PROCEDURE — 2700000000 HC OXYGEN THERAPY PER DAY

## 2021-04-23 PROCEDURE — 2060000000 HC ICU INTERMEDIATE R&B

## 2021-04-23 PROCEDURE — 2500000003 HC RX 250 WO HCPCS: Performed by: INTERNAL MEDICINE

## 2021-04-23 PROCEDURE — 36415 COLL VENOUS BLD VENIPUNCTURE: CPT

## 2021-04-23 PROCEDURE — 85025 COMPLETE CBC W/AUTO DIFF WBC: CPT

## 2021-04-23 PROCEDURE — 74230 X-RAY XM SWLNG FUNCJ C+: CPT

## 2021-04-23 PROCEDURE — 36592 COLLECT BLOOD FROM PICC: CPT

## 2021-04-23 PROCEDURE — 84145 PROCALCITONIN (PCT): CPT

## 2021-04-23 PROCEDURE — 94640 AIRWAY INHALATION TREATMENT: CPT

## 2021-04-23 PROCEDURE — 6370000000 HC RX 637 (ALT 250 FOR IP): Performed by: NURSE PRACTITIONER

## 2021-04-23 PROCEDURE — 84100 ASSAY OF PHOSPHORUS: CPT

## 2021-04-23 PROCEDURE — 80048 BASIC METABOLIC PNL TOTAL CA: CPT

## 2021-04-23 RX ORDER — ACETAZOLAMIDE 250 MG/1
500 TABLET ORAL DAILY
Status: DISCONTINUED | OUTPATIENT
Start: 2021-04-23 | End: 2021-04-24

## 2021-04-23 RX ADMIN — ACETAZOLAMIDE 500 MG: 250 TABLET ORAL at 10:37

## 2021-04-23 RX ADMIN — MEROPENEM 500 MG: 500 INJECTION, POWDER, FOR SOLUTION INTRAVENOUS at 02:53

## 2021-04-23 RX ADMIN — MEROPENEM 500 MG: 500 INJECTION, POWDER, FOR SOLUTION INTRAVENOUS at 08:00

## 2021-04-23 RX ADMIN — MEROPENEM 500 MG: 500 INJECTION, POWDER, FOR SOLUTION INTRAVENOUS at 20:24

## 2021-04-23 RX ADMIN — IPRATROPIUM BROMIDE AND ALBUTEROL SULFATE 1 AMPULE: .5; 3 SOLUTION RESPIRATORY (INHALATION) at 08:32

## 2021-04-23 RX ADMIN — IPRATROPIUM BROMIDE AND ALBUTEROL SULFATE 1 AMPULE: .5; 3 SOLUTION RESPIRATORY (INHALATION) at 04:14

## 2021-04-23 RX ADMIN — SODIUM CHLORIDE, PRESERVATIVE FREE 10 ML: 5 INJECTION INTRAVENOUS at 20:27

## 2021-04-23 RX ADMIN — MEROPENEM 500 MG: 500 INJECTION, POWDER, FOR SOLUTION INTRAVENOUS at 14:31

## 2021-04-23 RX ADMIN — VANCOMYCIN HYDROCHLORIDE 750 MG: 750 INJECTION, POWDER, LYOPHILIZED, FOR SOLUTION INTRAVENOUS at 08:31

## 2021-04-23 RX ADMIN — METOPROLOL TARTRATE 12.5 MG: 25 TABLET, FILM COATED ORAL at 08:00

## 2021-04-23 RX ADMIN — FLUTICASONE PROPIONATE 2 SPRAY: 50 SPRAY, METERED NASAL at 08:02

## 2021-04-23 RX ADMIN — DULOXETINE HYDROCHLORIDE 90 MG: 30 CAPSULE, DELAYED RELEASE ORAL at 08:02

## 2021-04-23 RX ADMIN — IPRATROPIUM BROMIDE AND ALBUTEROL SULFATE 1 AMPULE: .5; 3 SOLUTION RESPIRATORY (INHALATION) at 11:56

## 2021-04-23 RX ADMIN — SODIUM CHLORIDE SOLN NEBU 3% 15 ML: 3 NEBU SOLN at 08:32

## 2021-04-23 RX ADMIN — VANCOMYCIN HYDROCHLORIDE 750 MG: 750 INJECTION, POWDER, LYOPHILIZED, FOR SOLUTION INTRAVENOUS at 21:38

## 2021-04-23 RX ADMIN — ASPIRIN 81 MG: 81 TABLET, CHEWABLE ORAL at 08:00

## 2021-04-23 RX ADMIN — IPRATROPIUM BROMIDE AND ALBUTEROL SULFATE 1 AMPULE: .5; 3 SOLUTION RESPIRATORY (INHALATION) at 19:55

## 2021-04-23 RX ADMIN — ONDANSETRON 4 MG: 2 INJECTION INTRAMUSCULAR; INTRAVENOUS at 13:14

## 2021-04-23 RX ADMIN — SODIUM CHLORIDE, PRESERVATIVE FREE 10 ML: 5 INJECTION INTRAVENOUS at 08:00

## 2021-04-23 RX ADMIN — HEPARIN SODIUM 980 UNITS/HR: 10000 INJECTION, SOLUTION INTRAVENOUS at 16:26

## 2021-04-23 RX ADMIN — SODIUM CHLORIDE SOLN NEBU 3% 15 ML: 3 NEBU SOLN at 19:55

## 2021-04-23 RX ADMIN — MAGNESIUM SULFATE HEPTAHYDRATE 1000 MG: 1 INJECTION, SOLUTION INTRAVENOUS at 11:39

## 2021-04-23 RX ADMIN — MAGNESIUM SULFATE HEPTAHYDRATE 1000 MG: 1 INJECTION, SOLUTION INTRAVENOUS at 09:49

## 2021-04-23 RX ADMIN — IPRATROPIUM BROMIDE AND ALBUTEROL SULFATE 1 AMPULE: .5; 3 SOLUTION RESPIRATORY (INHALATION) at 00:17

## 2021-04-23 RX ADMIN — IPRATROPIUM BROMIDE AND ALBUTEROL SULFATE 1 AMPULE: .5; 3 SOLUTION RESPIRATORY (INHALATION) at 16:57

## 2021-04-23 ASSESSMENT — PAIN SCALES - GENERAL
PAINLEVEL_OUTOF10: 0
PAINLEVEL_OUTOF10: 0

## 2021-04-23 NOTE — PROGRESS NOTES
Hospitalist Progress Note      PCP: DELISA Leung CNP    Date of Admission: 4/17/2021    Chief Complaint: SOB    Hospital Course: Patient is a 70-year-old male with a past medical history of muscular dystrophy, COPD, hypertension who presented to the hospital with shortness of breath. Initially on nasal cannula and then requiring BiPAP. Patient refusing BiPAP and eventually rapid response called and patient was intubated. 4/18 Patient currently intubated and sedated on pressors. Per family patient's muscular dystrophy has been progressing over the past 6 months including his pulmonary and myocardial tissues. Mother and aunt at bedside and discussed long road ahead in regards to getting him off the ventilator if he has advanced muscular dystrophy    4/19 Patient awake alert on the vent to later today. Patient is following commands. Discussed that we will try and continue to wean him off the ventilator but this may be an issue with his history of muscular dystrophy. He is currently now on only Levophed and we will continue to titrate this off.    4/21  Patient still requiring the ventilator with 60% FiO2 and a PEEP of 5. Continue to titrate off ventilator in hopes of possible spontaneous breathing trial.  Patient underwent bronchoscopy on April 20 with bilateral lower lobe pneumonia. 4/22 Patient tolerating spontaneous breathing trial with plan to extubate later today. Subjective: Patient seen and examined. Patient extubated and on 2 L oxygen. Patient continues to make good urine output with over 5 L. Continue IV antibiotics for total of 7 days and then stop.     Medications:  Reviewed    Infusion Medications    heparin (PORCINE) Infusion 980 Units/hr (04/22/21 0695)    sodium chloride       Scheduled Medications    acetaZOLAMIDE  500 mg Oral Daily    meropenem  500 mg Intravenous Q6H    fluticasone  2 spray Each Nostril Daily    DULoxetine  90 mg Oral Daily    vancomycin 750 mg Intravenous Q12H    ipratropium-albuterol  1 ampule Inhalation Q4H    sodium chloride (Inhalant)  15 mL Nebulization TID    metoprolol tartrate  12.5 mg Per NG tube BID    aspirin  81 mg Per NG tube Daily    vancomycin (VANCOCIN) intermittent dosing (placeholder)   Other RX Placeholder    sodium chloride flush  10 mL Intravenous 2 times per day     PRN Meds: sodium phosphate IVPB **OR** sodium phosphate IVPB, polyvinyl alcohol, potassium chloride, magnesium sulfate, sodium chloride flush, sodium chloride, ondansetron, polyethylene glycol, acetaminophen **OR** acetaminophen, albuterol sulfate HFA      Intake/Output Summary (Last 24 hours) at 4/23/2021 1606  Last data filed at 4/23/2021 1521  Gross per 24 hour   Intake 325.61 ml   Output 1960 ml   Net -1634.39 ml       Physical Exam Performed:    /81   Pulse 89   Temp 98.2 °F (36.8 °C) (Axillary)   Resp 17   Ht 5' (1.524 m)   Wt 125 lb (56.7 kg)   SpO2 98%   BMI 24.41 kg/m²     General appearance: No apparent distress  HEENT: Pupils equal, round, and reactive to light. Conjunctivae/corneas clear. Neck: Supple, with full range of motion. No jugular venous distention. Trachea midline. Respiratory:  Normal respiratory effort. Diminished breath sounds bilateral cardiovascular: Regular rate and rhythm with normal S1/S2 without murmurs, rubs or gallops. Abdomen: Soft, non-tender, non-distended with normal bowel sounds. Musculoskeletal: No clubbing, cyanosis or edema bilaterally. Skin: Skin color, texture, turgor normal.  No rashes or lesions.   Neurologic: No focal deficits  Psychiatric: Alert and oriented x3  Capillary Refill: Brisk,< 3 seconds   Peripheral Pulses: +2 palpable, equal bilaterally       Labs:   Recent Labs     04/21/21  0453 04/22/21  0527 04/23/21  0455   WBC 12.8* 6.9 7.5   HGB 8.7* 8.7* 9.7*   HCT 27.7* 27.2* 30.6*    205 287     Recent Labs     04/21/21  0453 04/21/21  1301 04/22/21  0527 04/23/21  0455   * --  133* 138   K 3.4* 4.4 3.6 3.8   CL 98*  --  98* 96*   CO2 24  --  28 35*   BUN 9  --  8 6*   CREATININE <0.5*  --  <0.5* <0.5*   CALCIUM 7.8*  --  7.8* 8.4   PHOS 3.1  --  2.5 3.2     No results for input(s): AST, ALT, BILIDIR, BILITOT, ALKPHOS in the last 72 hours. No results for input(s): INR in the last 72 hours. Recent Labs     04/23/21  0455   TROPONINI 0.03*       Urinalysis:      Lab Results   Component Value Date    NITRU Negative 04/18/2021    WBCUA 10 04/18/2021    BACTERIA 3+ 11/30/2014    RBCUA 45 04/18/2021    BLOODU MODERATE 04/18/2021    SPECGRAV >1.030 04/18/2021    GLUCOSEU 250 04/18/2021    GLUCOSEU NEGATIVE 03/19/2011       Radiology:  Fluoroscopy modified barium swallow with video   Final Result   1. Moderate laryngeal penetration without tracheal aspiration of honey   thickened liquids. 2. Flash laryngeal penetration without tracheal aspiration of purees. 3. Progressive coating and pooling of residual barium product within the   piriform sinuses and posterior pharyngeal wall, especially with purees. Please see separate speech pathology report for full discussion of findings   and recommendations. XR CHEST PORTABLE   Final Result   1. Stable volume of mild-to-moderate bilateral layering pleural effusions. 2. Mild worsening of bilateral perihilar ill-defined consolidation most   suggestive of alveolar edema. 3. Moderate cardiomegaly. 4. Recommend retraction of endotracheal tube 2.5 cm. XR CHEST PORTABLE   Final Result   Stable perihilar edema with bilateral pleural effusions and bibasilar volume   loss. Enteric catheter tip in the distal gastric body or gastric antrum just below   the lower margin of the image. XR CHEST PORTABLE   Final Result   Increasing bilateral lower lobe airspace disease with volume loss concerning   for mucous plugging and lower lobe collapse. Pneumonia also considered.       The findings were sent to the Radiology Results

## 2021-04-23 NOTE — CARE COORDINATION
Per chart review, MBS pending. Extubated 4/22/2021. PT/OT evals pending. CM will continue to follow to assist with care coordination/D/C needs.   Anabel Farooq RN, BSN, Case Management  Phone: [phone]  Electronically signed by Anabel Farooq RN on 4/23/2021 at 12:55 PM

## 2021-04-23 NOTE — PROGRESS NOTES
Clinical Pharmacy Note  Heparin Dosing       Lab Results   Component Value Date    APTT 66.0 04/22/2021     Lab Results   Component Value Date    HGB 8.7 04/22/2021    HCT 27.2 04/22/2021     04/22/2021    INR 0.92 06/28/2015       Current Infusion Rate: 980 units/hr    Plan:  Continue current rate  Next aPTT: 0600 4/23/21    Pharmacy will continue to monitor and adjust based on aPTT results.   Electronically signed by Karlene Wang, [address] [place] [address] on 4/23/2021 at 12:18 AM

## 2021-04-23 NOTE — PROGRESS NOTES
Progress Note  The patient is being evaluated for Hx of muscular dystrophy. Updates  No acute events overnight. Patient feels notably improved.      Active Ambulatory Problems     Diagnosis Date Noted    Scoliosis     [facility]cular dyst[facility] ([facility] [facility]     Narcotic abuse ([facility] [facility]     Anxiety     Essential hypertension     Obstructive sleep apnea     COPD, moderate (HCC)     GERD without esophagitis     Erectile dysfunction of organic origin     Nausea & vomiting 11/13/2016    Diarrhea 11/13/2016    Generalized weakness 11/13/2016    Hyponatremia 11/13/2016    Hypochloremia 11/13/2016    Hyperglycemia     Pneumonia 08/26/2019     Resolved Ambulatory Problems     Diagnosis Date Noted    Medial malleolar fracture 01/22/2010    Synovitis of right ankle 07/28/2011    Elevated troponin 11/13/2016     No Additional Past Medical History       Current Facility-Administered Medications:     acetaZOLAMIDE (DIAMOX) tablet 500 mg, 500 mg, Oral, Daily, Larry Villafana MD, 500 mg at 04/23/21 1037    sodium phosphate 9.06 mmol in dextrose 5 % 250 mL IVPB, 0.16 mmol/kg, Intravenous, PRN **OR** sodium phosphate 18.15 mmol in dextrose 5 % 250 mL IVPB, 0.32 mmol/kg, Intravenous, PRN, Derek Posey MD    meropenem (MERREM) 500 mg IVPB (mini-bag), 500 mg, Intravenous, Q6H, Larry Villafana MD, Stopped at 04/23/21 0830    fluticasone (FLONASE) 50 MCG/ACT nasal spray 2 spray, 2 spray, Each Nostril, Daily, Benjamen Jose, APRN - CNP, 2 spray at 04/23/21 0802    polyvinyl alcohol (LIQUIFILM TEARS) 1.4 % ophthalmic solution 1 drop, 1 drop, Both Eyes, PRN, Benjamen Jose, APRN - CNP, 1 drop at 04/22/21 2045    DULoxetine (CYMBALTA) extended release capsule 90 mg, 90 mg, Oral, Daily, Ranjan Haney MD, 90 mg at 04/23/21 0802    vancomycin (VANCOCIN) 750 mg in dextrose 5 % 250 mL IVPB, 750 mg, Intravenous, Q12H, Shivani Gray MD, Stopped at 04/23/21 0931    ipratropium-albuterol (DUONEB) nebulizer solution 1 ampule, 1 ampule, Inhalation, Q4H, Jacque Rubinstein, APRN - CNP, 1 ampule at 04/23/21 1156    sodium chloride (Inhalant) 3 % nebulizer solution 15 mL, 15 mL, Nebulization, TID, Yudi Kat, DELISA - CNP, 15 mL at 04/23/21 7909    metoprolol tartrate (LOPRESSOR) tablet 12.5 mg, 12.5 mg, Per NG tube, BID, Nino Powers MD, 12.5 mg at 04/23/21 0800    aspirin chewable tablet 81 mg, 81 mg, Per NG tube, Daily, Nino Powers MD, 81 mg at 04/23/21 0800    vancomycin (VANCOCIN) intermittent dosing (placeholder), , Other, RX Placeholder, Lauren Louie MD    heparin 25,000 unit in sodium chloride 0.45% 250 mL (premix) infusion, 0-3,000 Units/hr, Intravenous, Continuous, Sylwia Bauer MD, Last Rate: 9.8 mL/hr at 04/22/21 1545, 980 Units/hr at 04/22/21 1545    potassium chloride 20 mEq/50 mL IVPB (Central Line), 20 mEq, Intravenous, PRN, Petty Farley MD, Last Rate: 50 mL/hr at 04/19/21 0749, 20 mEq at 04/19/21 0749    magnesium sulfate 1000 mg in dextrose 5% 100 mL IVPB, 1,000 mg, Intravenous, PRN, Petty Farley MD, Last Rate: 100 mL/hr at 04/23/21 1139, 1,000 mg at 04/23/21 1139    sodium chloride flush 0.9 % injection 10 mL, 10 mL, Intravenous, 2 times per day, Isela Beckett MD, 10 mL at 04/23/21 0800    sodium chloride flush 0.9 % injection 10 mL, 10 mL, Intravenous, PRN, Isela Beckett MD    0.9 % sodium chloride infusion, 25 mL, Intravenous, PRN, Isela Beckett MD    ondansetron [facility]) injection 4 mg, 4 mg, Intravenous, Q4H PRN, Isela Beckett MD    polyethylene glycol [facility]) packet 17 g, 17 g, Oral, Daily PRN, Isela Beckett MD    acetaminophen (TYLENOL) tablet 650 mg, 650 mg, Oral, Q4H PRN **OR** acetaminophen (TYLENOL) suppository 650 mg, 650 mg, Rectal, Q4H PRN, Isela Beckett MD    albuterol sulfate  (90 Base) MCG/ACT inhaler 2 puff, 2 puff, Inhalation, Q6H PRN, Isela Beckett MD      ROS -   Eyes- No diplopia.  No photophobia. Ears/nose/throat- No dysphagia. No Dysarthria  Cardiovascular- No palpitations. No chest pain  Respiratory- + improving dyspnea. + Cough  Neurologic- + general weakness hx MD. No Headache.  acetaZOLAMIDE  500 mg Oral Daily    meropenem  500 mg Intravenous Q6H    fluticasone  2 spray Each Nostril Daily    DULoxetine  90 mg Oral Daily    vancomycin  750 mg Intravenous Q12H    ipratropium-albuterol  1 ampule Inhalation Q4H    sodium chloride (Inhalant)  15 mL Nebulization TID    metoprolol tartrate  12.5 mg Per NG tube BID    aspirin  81 mg Per NG tube Daily    vancomycin (VANCOCIN) intermittent dosing (placeholder)   Other RX Placeholder    sodium chloride flush  10 mL Intravenous 2 times per day         heparin (PORCINE) Infusion 980 Units/hr (04/22/21 7015)    sodium chloride          sodium phosphate IVPB **OR** sodium phosphate IVPB, polyvinyl alcohol, potassium chloride, magnesium sulfate, sodium chloride flush, sodium chloride, ondansetron, polyethylene glycol, acetaminophen **OR** acetaminophen, albuterol sulfate HFA     Exam:  Blood pressure (!) 144/79, pulse 80, temperature 98.2 °F (36.8 °C), temperature source Axillary, resp. rate 21, height 5' (1.524 m), weight 125 lb (56.7 kg), SpO2 97 %. Constitutional    Vital signs: BP, HR, and RR reviewed   General Alert, no distress, well-nourished  Eyes: unable to visualize the fundi  Cardiovascular: pulses symmetric in all 4 extremities. No peripheral edema. Psychiatric: cooperative with examination, no  psychotic behavior noted. Neurologic  Mental status:   orientation to person, place, time and situation   General fund of knowledge:  grossly intact   Memory: Short term and long term intact   Attention intact as able to attend well to the exam     Language fluent in conversation.     Comprehension intact; follows simple commands  Cranial nerves:   CN2: Visual Fields full w/o extinction on confrontational testing  CN 3,4,6: extraocular muscles intact  CN5: V1: V2: V3: intact to light touch sensation bilaterally  CN7: upper and lower facial symmetric. Voice is hoarse. CN8: hearing grossly intact to conversation. Strength: Antigravity in 4/4 limbs without obvious focal weakness. Tone: mildy decreased in all 4 extremities  Gait: deferred for safety. Labs  Na: 138  K: 3.8  BUN: 6  Creatinine: <0.5  Glucose: 90  M.6  Ca: 8.4  Ph: 3.2    WBC: 7.5  Hgb: 9.7 (uptrending)  Hct: 30.6  Platelet: 222      Studies  No brain imaging on file for this admission. Chest Xray 21: 1. Stable volume of mild-to-moderate bilateral layering pleural effusions. 2. Mild worsening of bilateral perihilar ill-defined consolidation most suggestive of alveolar edema. 3. Moderate cardiomegaly. 4. Recommend retraction of endotracheal tube 2.5 cm. TTE 21:  Ejection fraction is visually estimated to be 55-60%. Normal diastolic function. Severly dilated right ventricle. Right ventricular systolic function is normal   No significant valvular heart disease     CT Chest PE 21: Linear atelectasis versus scarring right middle lobe. No evidence for acute infiltrates. This study is not optimized for evaluation of the pulmonary arteries. There are no central filling defects however the possibly of small distal pulmonary emboli cannot be totally excluded     Impression  1. Acute respiratory failure in a patient w/ a reported hx of congenital muscular dystrophy. Unfortunately there is minimal documentation available pertaining to his hx of MD in the chart. Family previously reported a functional decline in the last 6 months or so. Respiratory status improving-on 2L NC  2. NSTEMI  3. KOSTAS    Clinically improving. Recommendations:  - Continued supportive efforts as you are.   - Rehab as able. Will sign off. Please do not hesitate to call back with any questions or concerns.      Gurwinder Anaya, [address] Neurology    A copy of this note was provided for Dr [place] Solares MD

## 2021-04-23 NOTE — PLAN OF CARE
Problem: Falls - Risk of:  Goal: Will remain free from falls  Description: Will remain free from falls  Outcome: Ongoing  Note: Side rails up x 3. Bed locked and low position. Falling star program remains in place. Call light and personal belongings within reach. Frequent visual monitoring continues. Toileting program inplace. Patient assisted in turning/repositioning at least once every 2 hours, and on a prn basis. Problem: Skin Integrity:  Goal: Will show no infection signs and symptoms  Description: Will show no infection signs and symptoms  Outcome: Ongoing  Note: Reposition was performed every two hours and PRN basis. Skin care was provided. Problem: Skin Integrity:  Goal: Absence of new skin breakdown  Description: Absence of new skin breakdown  Outcome: Ongoing  Note: No signs of new skin injury was noted. Problem: Breathing Pattern - Ineffective:  Goal: Ability to achieve and maintain a regular respiratory rate will improve  Description: Ability to achieve and maintain a regular respiratory rate will improve  Outcome: Ongoing  Note: Pt on nasal canula at 2 lt/min.      Problem: Discharge Planning:  Goal: Discharged to appropriate level of care  Description: Discharged to appropriate level of care  Outcome: Ongoing     Problem: Cardiac Output - Decreased:  Goal: Avoidance of environmental tobacco smoke  Description: Absence of orthostatic hypotension  Outcome: Ongoing     Problem: Cardiac Output - Decreased:  Goal: Cardiac output within specified parameters  Description: Cardiac output within specified parameters  Outcome: Ongoing     Problem: Cardiac Output - Decreased:  Goal: Hemodynamic stability will improve  Description: Hemodynamic stability will improve  Outcome: Ongoing     Problem: Fluid Volume - Imbalance:  Goal: Absence of imbalanced fluid volume signs and symptoms  Description: Absence of imbalanced fluid volume signs and symptoms  Outcome: Ongoing     Problem: Tissue Perfusion,

## 2021-04-23 NOTE — PROGRESS NOTES
Speech Language Pathology  Facility/Department: [address] ICU   CLINICAL BEDSIDE SWALLOW EVALUATION    NAME: Zaid Jacobsen  : 1965  MRN: [id number]    ADMISSION DATE: 2021  ADMITTING DIAGNOSIS:Acute respiratory failure with hypoxia ([address] [J96.01]  Zaid Jacobsen has Scoliosis; Muscular dystrophy ([address] Narcotic abuse ([address] Anxiety; Essential hypertension; Obstructive sleep apnea; COPD, moderate ([address] GERD without esophagitis; Erectile dysfunction of organic origin; Nausea & vomiting; Diarrhea; Generalized weakness; Hyponatremia; Hypochloremia; Hyperglycemia; Pneumonia; Acute respiratory failure with hypoxia ([address] Elevated troponin; Acute respiratory failure with hypoxia and hypercapnia ([address] Abnormal chest x-ray; and Shock ([address] on their problem list.  ONSET DATE: 2021    CHART REVIEW    2021 admitted w/ SOB: H&P HPI: Zaid Jacobsen is a 54 y.o. male who presents with shortness of breath. Onset was few days ago. He states that he is mildly short of breath at baseline due to his muscular dystrophy. He therefore called EMS. EMS on arrival noted a pulse ox of 69% on room air. When he arrived here he dipped into the 70s with removal of oxygen. This was documented. He appears in no acute distress at bedside. Patient does endorse some substernal chest pain, none currently. Is endorsing also some epigastric abdominal pain. No nausea vomiting or diarrhea. No recent fevers or chills. No new cough. The duration has been constant since the onset. He does not typically require any oxygen at home. Symptoms started spontaneously. No alleviating factors. Was brought to the ED via EMS for further evaluation and treatment. 2021 patient put on BiPAP    2021 XR CHEST:   Impression   Moderate cardiomegaly without pulmonary vascular congestion.  Mild bibasilar   atelectasis worse on the left.  Some underlying scarring also suspected.      2021 CT CHEST PULMONARY EMBOLISM: Impression   Linear atelectasis versus scarring right middle lobe.  No evidence for acute   infiltrates.       This study is not optimized for evaluation of the pulmonary arteries.       There are no central filling defects however the possibly of small distal   pulmonary emboli cannot be totally excluded     4/17/2021 rapid response - patient intubated    4/22/2021 patient extubated    4/22/2021 XR CHEST:   Impression   1. Stable volume of mild-to-moderate bilateral layering pleural effusions. 2. Mild worsening of bilateral perihilar ill-defined consolidation most   suggestive of alveolar edema. 3. Moderate cardiomegaly. 4. Recommend retraction of endotracheal tube 2.5 cm.     4/23/2021 MBS scheduled    Date of Eval: 4/23/2021  Evaluating Therapist: Rodney Tejada    Current Diet level:  Current Diet : NPO  Current Liquid Diet : NPO      Primary Complaint  Patient Complaint: Patient denies dysphagia prior to admission. Med team concern for dysphagia status post extubation. Pain:  Pain Level: 0    Reason for Referral  Guille Orourke was referred for a bedside swallow evaluation to assess the efficiency of his swallow function, identify signs and symptoms of aspiration and make recommendations regarding safe dietary consistencies, effective compensatory strategies, and safe eating environment. Impression  Dysphagia Diagnosis: Suspected needs further assessment; Moderate oral stage dysphagia; Severe pharyngeal stage dysphagia  Dysphagia Impression :   · Patient accepted and tolerated evaluation at bedside. · Patient alert, cooperative, pleasant; follows directions; verbally responsive; oriented to self, place, situation, month and year. · Moderate oral stage dysphagia characterized by decreased mastication and decreased lingual manipulation. Prolonged but adequate bolus formation and movement. Suspect premature bolus loss to the pharynx.    · Potentially severe pharyngeal stage dysphagia characterized by delayed swallow and weak laryngeal elevation with concern for increased potential for decreased pharyngeal peristalsis when assessed via digital palpation. Patient appears at increased risk for possible uncleared residue as well as silent aspiration. Overt signs/symptoms of penetration/aspiration with textured solid and honey, nectar, and thin liquids. · Recommend MBS for continued assessment. · Recommend NPO until MBS  Dysphagia Outcome Severity Scale: Level 1: Severe dysphagia- NPO. Unable to tolerate any PO safely     Treatment Plan  Requires SLP Intervention: Yes  Duration/Frequency of Treatment: ST to treat 3-5x per week during acute admission  D/C Recommendations: (suspect potential skilled ST needs at discharge)    Recommended Diet and Intervention  Diet Solids Recommendation: NPO - pending MBS  Liquid Consistency Recommendation: NPO - pending MBS  Recommended Form of Meds: Crushed in puree as able - pending MBS  Recommendations: Modified barium swallow study  Therapeutic Interventions: pending MBS    Compensatory Swallowing Strategies  Compensatory Swallowing Strategies: pending MBS    Treatment/Goals  Dysphagia Goals: The patient will tolerate instrumental swallowing procedure    General  Chart Reviewed: Yes  Behavior/Cognition: Alert; Cooperative;Pleasant mood  Communication Observation: Dysarthria  Follows Directions: Simple  Dentition: Some missing teeth  Patient Positioning: Upright in bed  Baseline Vocal Quality: Weak  Volitional Cough: Weak  Volitional Swallow: Delayed  Consistencies Administered: Dysphagia Pureed (Dysphagia I); Dysphagia Soft and Bite-Sized (Dysphagia III); Honey - cup;Nectar - cup; Thin - cup; Thin - straw    Vision/Hearing  Vision: adequate for assessment needs  Hearing: adequate for assessment needs    Oral Motor Deficits  Oral Motor: Exceptions to [facility]  Labial Strength: Reduced  Labial Coordination: Reduced  Lingual Strength: Reduced  Lingual Coordination: Reduced    Oral Phase Dysfunction  Impaired Mastication: Soft Solid  Decreased Anterior to Posterior Transit: All  Suspected Premature Bolus Loss: All     Indicators of Pharyngeal Phase Dysfunction  Delayed Swallow: All  Decreased Laryngeal Elevation: All  Cough - Immediate: Soft Solid; Nectar - cup; Thin - cup; Thin - straw;Honey - cup  Pharyngeal: Weak laryngeal elevation with concern for increased potential for decreased pharyngeal peristalsis when assessed via digital palpation. Patient appears at increased risk for possible uncleared residue as well as silent aspiration. Recommend MBS    Prognosis  Prognosis for safe diet advancement: guarded  Consulted and agree with results and recommendations: Patient;RN    Education  Patient Education: Completed on results/recs/plan  Patient Education Response: Verbalizes understanding       Therapy Time  SLP Individual Minutes  Time In: 0845  Time Out: 0915  Minutes: [address] Ball B.SRoland [employer] [facility] [employer]   SLP Intern  4/23/2021 9:25 AM     This Speech Language Pathologist was present directed the patient's care, made skilled judgement and was responsible for assessment and treatment. Gretchen Esquivel, [address] [facility], [id number]  Speech-Language Pathologist

## 2021-04-23 NOTE — PROGRESS NOTES
Pulmonary Progress Note    CC: Acute hypoxic/hypercarbic respiratory failure  Abnormal chest x-ray  Shock  Hyponatremia  Elevated troponin   Neuromuscular dystrophy     24 hr events:  Extubated yesterday. Received lasix and had 5L of urine output in the past 24 hours. On 2L NC    ROS:  No SOB  +cough  No vomiting     PHYSICAL EXAM:  Blood pressure 125/77, pulse 92, temperature 98.3 °F (36.8 °C), temperature source Axillary, resp. rate 20, height 5' (1.524 m), weight 125 lb (56.7 kg), SpO2 95 %. on 2L NC      Intake/Output Summary (Last 24 hours) at 4/23/2021 0741  Last data filed at 4/23/2021 0730  Gross per 24 hour   Intake 1135.61 ml   Output 4960 ml   Net -3824.39 ml       Gen: Well developed; well nourished  Eyes: No scleral icterus. No conjunctival injection. ENT: External appearance of ears and nose normal.  Neck: Trachea midline. No obvious mass. No visible thyroid enlargement    Respiratory: Decreased breath sounds over bilateral lower lung zones, no accessory muscle use  Cardiovascular: Regular rate and rhythm, no appreciable murmurs. No edema. Skin: Warm and dry. No rashes or ulcers on visible areas. Normal texture and turgor  Musculoskeletal: No cyanosis, clubbing or joint deformity.     Psychiatric: Normal mood and affect; exhibits normal insight and judgement     Medications:  Current Facility-Administered Medications: sodium phosphate 9.06 mmol in dextrose 5 % 250 mL IVPB, 0.16 mmol/kg, Intravenous, PRN **OR** sodium phosphate 18.15 mmol in dextrose 5 % 250 mL IVPB, 0.32 mmol/kg, Intravenous, PRN  meropenem (MERREM) 500 mg IVPB (mini-bag), 500 mg, Intravenous, Q6H  fluticasone (FLONASE) 50 MCG/ACT nasal spray 2 spray, 2 spray, Each Nostril, Daily  polyvinyl alcohol (LIQUIFILM TEARS) 1.4 % ophthalmic solution 1 drop, 1 drop, Both Eyes, PRN  DULoxetine (CYMBALTA) extended release capsule 90 mg, 90 mg, Oral, Daily  vancomycin (VANCOCIN) 750 mg in dextrose 5 % 250 mL IVPB, 750 mg, Intravenous, Q12H  ipratropium-albuterol (DUONEB) nebulizer solution 1 ampule, 1 ampule, Inhalation, Q4H  sodium chloride (Inhalant) 3 % nebulizer solution 15 mL, 15 mL, Nebulization, TID  metoprolol tartrate (LOPRESSOR) tablet 12.5 mg, 12.5 mg, Per NG tube, BID  aspirin chewable tablet 81 mg, 81 mg, Per NG tube, Daily  vancomycin (VANCOCIN) intermittent dosing (placeholder), , Other, RX Placeholder  heparin 25,000 unit in sodium chloride 0.45% 250 mL (premix) infusion, 0-3,000 Units/hr, Intravenous, Continuous  potassium chloride 20 mEq/50 mL IVPB (Central Line), 20 mEq, Intravenous, PRN  magnesium sulfate 1000 mg in dextrose 5% 100 mL IVPB, 1,000 mg, Intravenous, PRN  sodium chloride flush 0.9 % injection 10 mL, 10 mL, Intravenous, 2 times per day  sodium chloride flush 0.9 % injection 10 mL, 10 mL, Intravenous, PRN  0.9 % sodium chloride infusion, 25 mL, Intravenous, PRN  ondansetron (ZOFRAN) injection 4 mg, 4 mg, Intravenous, Q4H PRN  polyethylene glycol (GLYCOLAX) packet 17 g, 17 g, Oral, Daily PRN  acetaminophen (TYLENOL) tablet 650 mg, 650 mg, Oral, Q4H PRN **OR** acetaminophen (TYLENOL) suppository 650 mg, 650 mg, Rectal, Q4H PRN  albuterol sulfate  (90 Base) MCG/ACT inhaler 2 puff, 2 puff, Inhalation, Q6H PRN    Data reviewed:  Labs:  CBC:   Recent Labs     04/21/21  0453 04/22/21  0527 04/23/21  0455   WBC 12.8* 6.9 7.5   HGB 8.7* 8.7* 9.7*   HCT 27.7* 27.2* 30.6*   MCV 81.0 80.7 80.6    205 287     BMP:   Recent Labs     04/21/21  0453 04/21/21  1301 04/22/21  0527 04/23/21  0455   *  --  133* 138   K 3.4* 4.4 3.6 3.8   CL 98*  --  98* 96*   CO2 24  --  28 35*   PHOS 3.1  --  2.5 3.2   BUN 9  --  8 6*   CREATININE <0.5*  --  <0.5* <0.5*     LIVER PROFILE:   No results for input(s): AST, ALT, LIPASE, BILIDIR, BILITOT, ALKPHOS in the last 72 hours. Invalid input(s): AMYLASE,  ALB  PT/INR: No results for input(s): PROTIME, INR in the last 72 hours.   APTT:   Recent Labs     04/22/21  1425 21  2215 21  0455   APTT 69.4* 66.0* 68.9*       AB/19- 7.59/32/61 (on 70%)   - 7.47/40/67 (on 80%)    Cultures:   Blood culture (): Negative  Blood culture (): Negative  Urine culture (): Negative  Sputum culture (): Normal angela   BAL (): Negative  Nasal MRSA probe: Negative  COVID-19: Negative      Films:  Chest images and reports were reviewed by me. My interpretation is:  CXR (21): Vascular congestion; stable consolidation in BLL; ETT, CVC and gastric tube in place      Assessment:     Acute hypoxic/hypercarbic respiratory failure  Abnormal chest x-ray  Shock  Hyponatremia  Elevated troponin   Neuromuscular dystrophy     Plan:    Acute hypoxic/hypercarbic respiratory failure  -In the setting of neuromuscular dystrophy and bilateral lower lobe pneumonia  -Give 3 doses of diamox   -Extubated yesterday. Wean supplemental oxygen as able to keep saturation>90%    Abnormal chest x-ray  -Has bilateral lower lobe pneumonia  -Continue meropenem and vancomycin (day #6/7). Completed 5 days of azithromycin  -Continue Duonebs and 3% saline nebs    Shock  -Continue meropenem and vancomycin (day #6/7)  -Off levophed     Hyponatremia  -Resolved     Elevated troponin   -On heparin drip  -Cardiology following    Neuromuscular dystrophy   -Neurology following      Prophylaxis  DVT- on heparin drip  GI- protonix    Patient is at high risk given the presence of pneumonia in the setting of neuromuscular dystrophy leading to acute respiratory failure.      Rosa Morris MD  [facility]

## 2021-04-23 NOTE — PROGRESS NOTES
Clinical Pharmacy Note  Heparin Dosing       Lab Results   Component Value Date    APTT 68.9 04/23/2021     Lab Results   Component Value Date    HGB 9.7 04/23/2021    HCT 30.6 04/23/2021     04/23/2021    INR 0.92 06/28/2015       Current Infusion Rate: 980 units/hr    Plan:  Continue current rate  Next aPTT: 0600 4/24/21    Pharmacy will continue to monitor and adjust based on aPTT results.   Electronically signed by Jale Schmidt [facility] on 4/23/2021 at 6:20 AM

## 2021-04-23 NOTE — ACP (ADVANCE CARE PLANNING)
Advance Care Planning     Advance Care Planning Activator (Inpatient)  Conversation Note      Date of ACP Conversation: 4/17/2021    Conversation Conducted with: Patient with Decision Making Capacity    ACP Activator: Roe MARTELL [facility] Decision Maker:     Current Designated Health Care Decision Maker:     Primary Decision Maker: Rosa João [facility] - [phone]      Care Preferences    Ventilation: \"If you were in your present state of health and suddenly became very ill and were unable to breathe on your own, what would your preference be about the use of a ventilator (breathing machine) if it were available to you? \"      Would the patient desire the use of ventilator (breathing machine)?: no    \"If your health worsens and it becomes clear that your chance of recovery is unlikely, what would your preference be about the use of a ventilator (breathing machine) if it were available to you? \"     Would the patient desire the use of ventilator (breathing machine)?: No      Resuscitation  \"CPR works best to restart the heart when there is a sudden event, like a heart attack, in someone who is otherwise healthy. Unfortunately, CPR does not typically restart the heart for people who have serious health conditions or who are very sick. \"    \"In the event your heart stopped as a result of an underlying serious health condition, would you want attempts to be made to restart your heart (answer \"yes\" for attempt to resuscitate) or would you prefer a natural death (answer \"no\" for do not attempt to resuscitate)? \" no       [x] Yes   [] No   Educated Patient / Sharath Low regarding differences between Advance Directives and portable DNR orders.     Length of ACP Conversation in minutes:      Conversation Outcomes:  [x] ACP discussion completed  [x] New Advance Directive completed    Electronically signed by Naheed QUEVEDO, RN, CHPN on 4/23/2021 at 4:26 PM  Palliative Care Nurse  Phone [phone]

## 2021-04-23 NOTE — CARE COORDINATION
[facility]  Palliative Care   Progress Note    NAME:  Poli Mcmullen  MEDICAL RECORD NUMBER:  [id number]  AGE: 54 y.o. GENDER: male  : 1965  TODAY'S DATE:  2021    Subjective: Patient awake alert and oriented to person place time and situation. Objective:    Vitals:    21 1400   BP:    Pulse: 89   Resp:    Temp:    SpO2:      Lab Results   Component Value Date    WBC 7.5 2021    HGB 9.7 (L) 2021    HCT 30.6 (L) 2021    MCV 80.6 2021     2021     Lab Results   Component Value Date    CREATININE <0.5 (L) 2021    BUN 6 (L) 2021     2021    K 3.8 2021    CL 96 (L) 2021    CO2 35 (H) 2021     Lab Results   Component Value Date    ALT 23 2021    AST 18 2021    ALKPHOS 66 2021    BILITOT <0.2 2021       Plan:  Discussion with patient and his mother, Shila Freed concerning goals of care. He has completed HPOA with spiritual care. We have discussed code status and he does not want chest compressions/shocking or ventilator if his heart stops or he stops breathing, he wants to die a peaceful death. We have discussed feeding tube also and he feels, \"If I am that far gone let me go\". We briefly discussed home care with palliative care vs hospice care but will make that determination closer to discharge. Code Status: DNR CCA do not intubate  Discharge Environment:  [] Hospice Consult Agency:  [] Inpatient Hospice    [] Home with  [address]   [] ECF with Hospice  [] ECF skilled care with Hospice to follow   [] Other:    Teaching Time:  1hours      I will continue to follow Mr. Bennett Sanchez care as needed. Thank you for allowing me to participate in the care of Mr. Donna Krause .      Electronically signed by Ariel Shipley RN, [address] [place] [address] Tarah on 2021 at 3:23 PM  [address] [place] [address]  Office: [phone]

## 2021-04-23 NOTE — PROCEDURES
characterized by weak pharyngeal peristalsis and reduced pharyngeal wall contraction; reduced hyolaryngeal elevation and excursion; delayed swallow initiation to a lesser degree; and reduced cricopharyngeus opening. Rapid fatigue of mechanism noted, resulting in initially shallow but progressing to deeper penetration of honey via tsp during and after the swallow, and increasing pharyngeal residue (ie vallecular, pyriforms, and posterior pharyngeal wall) most symptomatic with puree but also present with honey. Severity of residue places pt at high risk for aspiration after the swallow. Pt does exhibit delayed sensory response to penetrated material with weak throat clear, which is not productive. Puree residue eventually pools above laryngeal vestibule with poor clearance attempts (ie dry swallow)    Attempt chin tuck: pt with limited head/neck movement and control and unable to execute  Prompts for effortful swallow were not effective in reducing or eliminating residue. Dysphagia Score: Dysphagia Outcome Severity Scale: Level 1: Severe dysphagia- NPO.  Unable to tolerate any PO safely    Aspiration/Penetration Risk:   Penetration-Aspiration Scale (PAS): 3 - Material enters the airway, remains above the vocal folds, and is not ejected from airway; high risk for aspiration during with honey and after with honey and puree    Diet Recommendations:  Solid consistency: NPO   Liquid consistency: NPO   Medication administration: (via alternate methods)     Compensatory Swallow Strategies:   Frequent oral care; potential need for oral suction    Therapy:  Requires SLP Intervention: Yes     Referrals:   Duration/Frequency of Treatment: ST to treat 3-5x per week during acute admission  Back to medical team re: alternate means of nutrition (NG via PEG?)    Therapy Interventions:   Therapeutic Interventions: Therapeutic PO trials with SLP, Patient/Family education, Effortful swallow, Oral motor exercises, Tongue base strengthening, Pharyngeal exercises, Laryngeal exercises    Goals:   Dysphagia Goals: (The pt will participate with swallow strengthening exs 5/5 as able; The pt will tolerate PO tx trials of puree 5/5 without overt s/s or difficulty)    Prognosis:  Prognosis for safe diet advancement: guarded  Barriers to reach goals: severity of dysphagia  Consulted and agree with results and recommendations: Patient, RN    Education:  Consulted and agree with results and recommendations: Patient, RN  Patient Education: Completed on results/recs/plan  Patient Education Response: Verbalizes understanding, Needs reinforcement    D/C Recommendations: D/C Recommendations: To be determined(continued ST for dysphagia)      Assessment: Test Data   General  Cognitive/Behavior  Behavior/Cognition  Behavior/Cognition: Alert; Cooperative;Pleasant mood    Vision/Hearing  Vision  Vision: (adequate for assessment needs)  Hearing  Hearing: (adequate for assessment needs)    Consistencies Assessed    Consistencies Administered: Dysphagia Pureed (Dysphagia I), Honey teaspoon    Positioning   Upright lateral in chair    Indicators of Oral Phase Dysfunction   Oral Preparation / Oral Phase  Oral Phase: Impaired  Oral Phase - Major Contributing Deficits  Weak Lingual Manipulation: All  Reduced Posterior Propulsion: All  Reduced Bolus Control: All  Lingual / Palatal Residue: Honey teaspoon  Premature Bolus Loss to Pharynx: All  Delayed Trigger of Palatal Elevation: All  Reduced Tongue Base Retraction: All    Indicators of Pharyngeal Phase Dysfunction  Pharyngeal Phase  Pharyngeal Phase: Impaired  Pharyngeal Phase: Impaired  Pharyngeal Phase - Major Contributing Deficits  Delayed Swallow Initiation: All  Premature Spillage to Valleculae:  All  Reduced Pharyngeal Peristalsis: All  Reduced Epiglottic Distention: All  Reduced Laryngeal Elevation: All  Reduced Anterior Laryngeal Movement: All  Shallow Penetration During: Honey teaspoon  Deep Penetration During: Honey teaspoon  Deep Penetration After: Honey teaspoon  Weak Cough Reflex: (weak throat clear)  Pharyngeal Residue - Valleculae: All  Pharyngeal Residue - Pyriform: All  Pharyngeal Residue - Posterior Pharynx: All  Pharyngeal Residue - UES: All  Pharyngeal Wall - Weakness:  All  Fatigue of Mechanism: All    Upper Esophageal Phase   Upper Esophageal Screen  Esophageal Screen: Impaired  Esophageal Screen: Impaired  Upper Esophageal Screen- Major Contributing Deficits  Reduced Cricopharyngeal Opening: All    MINUTES/CHARGES  Time In: 1325  SLP Individual Minutes  Time In: 1325  Time Out: 1410  Minutes: 45        Electronically signed by  Taras Helms MS, CCC-SLP #[id number]  Speech Language Pathologist    on 4/23/2021 at 2:28 PM

## 2021-04-23 NOTE — PROGRESS NOTES
Tyler   Daily Progress Note      Admit Date:  4/17/2021      Subjective:   Mr. Aden Rocha is a 51yo male with a past medical history significant for COPD and essential hypertension who presented to the [facility] ED via squad with respiratory failure. He is felt to have some mucous plugging and overnight required an increase in his FiO2 to 80%. This is been weaned down to 60% now. We were asked to evaluate him for elevated troponin assays found incidentally along with some ECG changes.     Interval history:  Chapincito Maki was extubated to BiPAP ventilation this morning. No events overnight. Sinus rhythm on telemetry. He has no complaints of chest pain. He is somewhat lethargic but answers questions appropriately. Objective:     BP (!) 135/96   Pulse 92   Temp 97.5 °F (36.4 °C) (Oral)   Resp 24   Ht 5' (1.524 m)   Wt 125 lb (56.7 kg)   SpO2 95%   BMI 24.41 kg/m²      Intake/Output Summary (Last 24 hours) at 4/23/2021 1825  Last data filed at 4/23/2021 1800  Gross per 24 hour   Intake 1087.61 ml   Output 2535 ml   Net -1447.39 ml       Physical Exam:  General:  Awake, alert, NAD  Skin:  Warm and dry  Neck:  JVD<8, no carotid bruits  Chest: On BiPAP.   Diminished bilaterally no wheezes/rhonchi/rales  Cardiovascular:  RRR, normal S1/S2, no M/R/G  Abdomen:  Soft, nontender, +bowel sounds  Extremities:  No edema  Pulses: 2+ bilat carotid    2+ bilat radial    2+ bilat femoral        Medications:    acetaZOLAMIDE  500 mg Oral Daily    meropenem  500 mg Intravenous Q6H    fluticasone  2 spray Each Nostril Daily    DULoxetine  90 mg Oral Daily    vancomycin  750 mg Intravenous Q12H    ipratropium-albuterol  1 ampule Inhalation Q4H    sodium chloride (Inhalant)  15 mL Nebulization TID    metoprolol tartrate  12.5 mg Per NG tube BID    aspirin  81 mg Per NG tube Daily    vancomycin (VANCOCIN) intermittent dosing (placeholder)   Other RX Placeholder    sodium chloride flush  10 mL Intravenous 2 times per day      heparin (PORCINE) Infusion 980 Units/hr (04/23/21 1626)    sodium chloride         Lab Data:  CBC:   Recent Labs     04/21/21  0453 04/22/21  0527 04/23/21  0455   WBC 12.8* 6.9 7.5   HGB 8.7* 8.7* 9.7*    205 287     BMP:    Recent Labs     04/21/21  0453 04/21/21  1301 04/22/21  0527 04/23/21  0455   *  --  133* 138   K 3.4* 4.4 3.6 3.8   CO2 24  --  28 35*   BUN 9  --  8 6*   CREATININE <0.5*  --  <0.5* <0.5*     Recent Labs     04/22/21  1425 04/22/21  2215 04/23/21 0455   APTT 69.4* 66.0* 68.9*     BNP:  No results for input(s): BNP in the last 72 hours. CARDIAC ENZYMES:  Recent Labs     04/23/21 0455   TROPONINI 0.03*     FASTING LIPID PANEL:  Lab Results   Component Value Date    CHOL 163 08/26/2019    HDL 75 08/26/2019    TRIG 75 08/26/2019     Echo 4/19/2021:  Ejection fraction is visually estimated to be 55-60%. Normal diastolic function. Severly dilated right ventricle. Right ventricular systolic function is normal   No significant valvular heart disease      Assessment:  1. Non-ST elevation MI  2. Acute respiratory failure with hypoxia      Plan:   Cal Garrido is stable at present. I discussed his cardiac status with him and think that a Lexiscan stress perfusion would be the best option. I see the patient had code discussions with palliative care and this is likely appropriate. I think on the stress perfusion I would be looking for a high risk stress result that would give further prognostic information. If the patient does not have high risk result we have to consider whether we would do any further evaluation. .            Signed:  Mary Muhammad MD

## 2021-04-24 LAB
ANION GAP SERPL CALCULATED.3IONS-SCNC: 8 MMOL/L (ref 3–16)
APTT: 68.2 SEC (ref 24.2–36.2)
APTT: 77.5 SEC (ref 24.2–36.2)
BASOPHILS ABSOLUTE: 0.1 K/UL (ref 0–0.2)
BASOPHILS RELATIVE PERCENT: 1.2 %
BUN BLDV-MCNC: 3 MG/DL (ref 7–20)
CALCIUM SERPL-MCNC: 8.8 MG/DL (ref 8.3–10.6)
CHLORIDE BLD-SCNC: 97 MMOL/L (ref 99–110)
CO2: 32 MMOL/L (ref 21–32)
CREAT SERPL-MCNC: <0.5 MG/DL (ref 0.9–1.3)
EOSINOPHILS ABSOLUTE: 0.3 K/UL (ref 0–0.6)
EOSINOPHILS RELATIVE PERCENT: 3.3 %
GFR AFRICAN AMERICAN: >60
GFR NON-AFRICAN AMERICAN: >60
GLUCOSE BLD-MCNC: 106 MG/DL (ref 70–99)
GLUCOSE BLD-MCNC: 76 MG/DL (ref 70–99)
GLUCOSE BLD-MCNC: 78 MG/DL (ref 70–99)
GLUCOSE BLD-MCNC: 81 MG/DL (ref 70–99)
GLUCOSE BLD-MCNC: 90 MG/DL (ref 70–99)
GLUCOSE BLD-MCNC: 91 MG/DL (ref 70–99)
HCT VFR BLD CALC: 26.6 % (ref 40.5–52.5)
HEMOGLOBIN: 8.3 G/DL (ref 13.5–17.5)
LV EF: 75 %
LVEF MODALITY: NORMAL
LYMPHOCYTES ABSOLUTE: 0.7 K/UL (ref 1–5.1)
LYMPHOCYTES RELATIVE PERCENT: 9 %
MAGNESIUM: 2.1 MG/DL (ref 1.8–2.4)
MCH RBC QN AUTO: 25.8 PG (ref 26–34)
MCHC RBC AUTO-ENTMCNC: 31.1 G/DL (ref 31–36)
MCV RBC AUTO: 82.8 FL (ref 80–100)
MONOCYTES ABSOLUTE: 0.3 K/UL (ref 0–1.3)
MONOCYTES RELATIVE PERCENT: 3.7 %
NEUTROPHILS ABSOLUTE: 6.3 K/UL (ref 1.7–7.7)
NEUTROPHILS RELATIVE PERCENT: 82.8 %
PDW BLD-RTO: 15.2 % (ref 12.4–15.4)
PERFORMED ON: ABNORMAL
PERFORMED ON: NORMAL
PHOSPHORUS: 3.4 MG/DL (ref 2.5–4.9)
PLATELET # BLD: 274 K/UL (ref 135–450)
PMV BLD AUTO: 7.8 FL (ref 5–10.5)
POTASSIUM REFLEX MAGNESIUM: 3.8 MMOL/L (ref 3.5–5.1)
RBC # BLD: 3.22 M/UL (ref 4.2–5.9)
SODIUM BLD-SCNC: 137 MMOL/L (ref 136–145)
WBC # BLD: 7.6 K/UL (ref 4–11)

## 2021-04-24 PROCEDURE — 6360000002 HC RX W HCPCS: Performed by: INTERNAL MEDICINE

## 2021-04-24 PROCEDURE — 3430000000 HC RX DIAGNOSTIC RADIOPHARMACEUTICAL: Performed by: INTERNAL MEDICINE

## 2021-04-24 PROCEDURE — A9502 TC99M TETROFOSMIN: HCPCS | Performed by: INTERNAL MEDICINE

## 2021-04-24 PROCEDURE — 83735 ASSAY OF MAGNESIUM: CPT

## 2021-04-24 PROCEDURE — 6370000000 HC RX 637 (ALT 250 FOR IP): Performed by: INTERNAL MEDICINE

## 2021-04-24 PROCEDURE — 2060000000 HC ICU INTERMEDIATE R&B

## 2021-04-24 PROCEDURE — 85730 THROMBOPLASTIN TIME PARTIAL: CPT

## 2021-04-24 PROCEDURE — 2700000000 HC OXYGEN THERAPY PER DAY

## 2021-04-24 PROCEDURE — 2580000003 HC RX 258: Performed by: INTERNAL MEDICINE

## 2021-04-24 PROCEDURE — 99233 SBSQ HOSP IP/OBS HIGH 50: CPT | Performed by: INTERNAL MEDICINE

## 2021-04-24 PROCEDURE — 99231 SBSQ HOSP IP/OBS SF/LOW 25: CPT | Performed by: INTERNAL MEDICINE

## 2021-04-24 PROCEDURE — 94640 AIRWAY INHALATION TREATMENT: CPT

## 2021-04-24 PROCEDURE — 2580000003 HC RX 258: Performed by: NURSE PRACTITIONER

## 2021-04-24 PROCEDURE — 78452 HT MUSCLE IMAGE SPECT MULT: CPT

## 2021-04-24 PROCEDURE — 85025 COMPLETE CBC W/AUTO DIFF WBC: CPT

## 2021-04-24 PROCEDURE — 84100 ASSAY OF PHOSPHORUS: CPT

## 2021-04-24 PROCEDURE — 80048 BASIC METABOLIC PNL TOTAL CA: CPT

## 2021-04-24 PROCEDURE — 94761 N-INVAS EAR/PLS OXIMETRY MLT: CPT

## 2021-04-24 PROCEDURE — 93017 CV STRESS TEST TRACING ONLY: CPT

## 2021-04-24 PROCEDURE — 6370000000 HC RX 637 (ALT 250 FOR IP): Performed by: NURSE PRACTITIONER

## 2021-04-24 RX ADMIN — IPRATROPIUM BROMIDE AND ALBUTEROL SULFATE 1 AMPULE: .5; 3 SOLUTION RESPIRATORY (INHALATION) at 00:05

## 2021-04-24 RX ADMIN — TETROFOSMIN 30 MILLICURIE: 1.38 INJECTION, POWDER, LYOPHILIZED, FOR SOLUTION INTRAVENOUS at 10:10

## 2021-04-24 RX ADMIN — IPRATROPIUM BROMIDE AND ALBUTEROL SULFATE 1 AMPULE: .5; 3 SOLUTION RESPIRATORY (INHALATION) at 04:10

## 2021-04-24 RX ADMIN — SODIUM CHLORIDE SOLN NEBU 3% 15 ML: 3 NEBU SOLN at 19:49

## 2021-04-24 RX ADMIN — DULOXETINE HYDROCHLORIDE 90 MG: 30 CAPSULE, DELAYED RELEASE ORAL at 17:10

## 2021-04-24 RX ADMIN — MEROPENEM 500 MG: 500 INJECTION, POWDER, FOR SOLUTION INTRAVENOUS at 21:13

## 2021-04-24 RX ADMIN — REGADENOSON 0.4 MG: 0.08 INJECTION, SOLUTION INTRAVENOUS at 10:02

## 2021-04-24 RX ADMIN — IPRATROPIUM BROMIDE AND ALBUTEROL SULFATE 1 AMPULE: .5; 3 SOLUTION RESPIRATORY (INHALATION) at 19:49

## 2021-04-24 RX ADMIN — IPRATROPIUM BROMIDE AND ALBUTEROL SULFATE 1 AMPULE: .5; 3 SOLUTION RESPIRATORY (INHALATION) at 13:48

## 2021-04-24 RX ADMIN — SODIUM CHLORIDE SOLN NEBU 3% 4 ML: 3 NEBU SOLN at 13:46

## 2021-04-24 RX ADMIN — TETROFOSMIN 10 MILLICURIE: 1.38 INJECTION, POWDER, LYOPHILIZED, FOR SOLUTION INTRAVENOUS at 07:37

## 2021-04-24 RX ADMIN — IPRATROPIUM BROMIDE AND ALBUTEROL SULFATE 1 AMPULE: .5; 3 SOLUTION RESPIRATORY (INHALATION) at 09:03

## 2021-04-24 RX ADMIN — ONDANSETRON 4 MG: 2 INJECTION INTRAMUSCULAR; INTRAVENOUS at 11:13

## 2021-04-24 RX ADMIN — MEROPENEM 500 MG: 500 INJECTION, POWDER, FOR SOLUTION INTRAVENOUS at 15:06

## 2021-04-24 RX ADMIN — FLUTICASONE PROPIONATE 2 SPRAY: 50 SPRAY, METERED NASAL at 09:28

## 2021-04-24 RX ADMIN — VANCOMYCIN HYDROCHLORIDE 750 MG: 750 INJECTION, POWDER, LYOPHILIZED, FOR SOLUTION INTRAVENOUS at 21:13

## 2021-04-24 RX ADMIN — MEROPENEM 500 MG: 500 INJECTION, POWDER, FOR SOLUTION INTRAVENOUS at 09:28

## 2021-04-24 RX ADMIN — VANCOMYCIN HYDROCHLORIDE 750 MG: 750 INJECTION, POWDER, LYOPHILIZED, FOR SOLUTION INTRAVENOUS at 09:28

## 2021-04-24 RX ADMIN — MEROPENEM 500 MG: 500 INJECTION, POWDER, FOR SOLUTION INTRAVENOUS at 02:54

## 2021-04-24 RX ADMIN — SODIUM CHLORIDE SOLN NEBU 3% 4 ML: 3 NEBU SOLN at 09:03

## 2021-04-24 RX ADMIN — IPRATROPIUM BROMIDE AND ALBUTEROL SULFATE 1 AMPULE: .5; 3 SOLUTION RESPIRATORY (INHALATION) at 17:03

## 2021-04-24 NOTE — PROGRESS NOTES
Pt arrived from ICU via bed to room 5278. Pt oriented to room, call light by his side and bed locked with alarm on.     Electronically signed by Parul Quiroga RN on 4/23/2021 at 10:12 PM

## 2021-04-24 NOTE — PROGRESS NOTES
Pulmonary Progress Note    CC: Acute hypoxic/hypercarbic respiratory failure  Bilateral lower lobe pneumonia   Muscular dystrophy     24 hr events:  Transferred from the ICU yesterday. Underwent stress test today. On room air. ROS:  No SOB  No cough  No vomiting     PHYSICAL EXAM:  Blood pressure 122/83, pulse 94, temperature 97.8 °F (36.6 °C), temperature source Oral, resp. rate 16, height 5' (1.524 m), weight 113 lb 1.5 oz (51.3 kg), SpO2 97 %. on RA      Intake/Output Summary (Last 24 hours) at 4/24/2021 1639  Last data filed at 4/24/2021 1141  Gross per 24 hour   Intake 1116.41 ml   Output 1925 ml   Net -808.59 ml       Gen: Well developed; well nourished  Eyes: No scleral icterus. No conjunctival injection. ENT: External appearance of ears and nose normal.  Neck: Trachea midline. No obvious mass. No visible thyroid enlargement    Respiratory: Decreased breath sounds over bilateral lower lung zones, no accessory muscle use  Cardiovascular: Regular rate and rhythm, no appreciable murmurs. No edema. Skin: Warm and dry. No rashes or ulcers on visible areas. Normal texture and turgor  Musculoskeletal: No cyanosis, clubbing or joint deformity.     Psychiatric: Normal mood and affect; exhibits normal insight and judgement     Medications:  Current Facility-Administered Medications: acetaZOLAMIDE (DIAMOX) tablet 500 mg, 500 mg, Oral, Daily  sodium phosphate 9.06 mmol in dextrose 5 % 250 mL IVPB, 0.16 mmol/kg, Intravenous, PRN **OR** sodium phosphate 18.15 mmol in dextrose 5 % 250 mL IVPB, 0.32 mmol/kg, Intravenous, PRN  meropenem (MERREM) 500 mg IVPB (mini-bag), 500 mg, Intravenous, Q6H  fluticasone (FLONASE) 50 MCG/ACT nasal spray 2 spray, 2 spray, Each Nostril, Daily  polyvinyl alcohol (LIQUIFILM TEARS) 1.4 % ophthalmic solution 1 drop, 1 drop, Both Eyes, PRN  DULoxetine (CYMBALTA) extended release capsule 90 mg, 90 mg, Oral, Daily  vancomycin (VANCOCIN) 750 mg in dextrose 5 % 250 mL IVPB, 750 mg, Intravenous, Q12H  ipratropium-albuterol (DUONEB) nebulizer solution 1 ampule, 1 ampule, Inhalation, Q4H  sodium chloride (Inhalant) 3 % nebulizer solution 15 mL, 15 mL, Nebulization, TID  metoprolol tartrate (LOPRESSOR) tablet 12.5 mg, 12.5 mg, Per NG tube, BID  aspirin chewable tablet 81 mg, 81 mg, Per NG tube, Daily  vancomycin (VANCOCIN) intermittent dosing (placeholder), , Other, RX Placeholder  potassium chloride 20 mEq/50 mL IVPB (Central Line), 20 mEq, Intravenous, PRN  magnesium sulfate 1000 mg in dextrose 5% 100 mL IVPB, 1,000 mg, Intravenous, PRN  sodium chloride flush 0.9 % injection 10 mL, 10 mL, Intravenous, 2 times per day  sodium chloride flush 0.9 % injection 10 mL, 10 mL, Intravenous, PRN  0.9 % sodium chloride infusion, 25 mL, Intravenous, PRN  ondansetron (ZOFRAN) injection 4 mg, 4 mg, Intravenous, Q4H PRN  polyethylene glycol (GLYCOLAX) packet 17 g, 17 g, Oral, Daily PRN  acetaminophen (TYLENOL) tablet 650 mg, 650 mg, Oral, Q4H PRN **OR** acetaminophen (TYLENOL) suppository 650 mg, 650 mg, Rectal, Q4H PRN  albuterol sulfate  (90 Base) MCG/ACT inhaler 2 puff, 2 puff, Inhalation, Q6H PRN    Data reviewed:  Labs:  CBC:   Recent Labs     21  0455 21  0615   WBC 6.9 7.5 7.6   HGB 8.7* 9.7* 8.3*   HCT 27.2* 30.6* 26.6*   MCV 80.7 80.6 82.8    287 274     BMP:   Recent Labs     21  0521  0455 21  0601   * 138 137   K 3.6 3.8 3.8   CL 98* 96* 97*   CO2 28 35* 32   PHOS 2.5 3.2 3.4   BUN 8 6* 3*   CREATININE <0.5* <0.5* <0.5*     LIVER PROFILE:   No results for input(s): AST, ALT, LIPASE, BILIDIR, BILITOT, ALKPHOS in the last 72 hours. Invalid input(s): AMYLASE,  ALB  PT/INR: No results for input(s): PROTIME, INR in the last 72 hours. APTT:   Recent Labs     21  0455 21  0601 21  1505   APTT 68.9* 77.5* 68.2*       AB/19- 7.59/32/61 (on 70%)   - 7.47/40/ (on 80%)    Cultures:   Blood culture ():  Negative  Blood

## 2021-04-24 NOTE — PLAN OF CARE
Problem: Non-Violent Restraints  Goal: Removal from restraints as soon as assessed to be safe  Outcome: Ongoing  Goal: No harm/injury to patient while restraints in use  Outcome: Ongoing  Goal: Patient's dignity will be maintained  Outcome: Ongoing     Problem: Falls - Risk of:  Goal: Will remain free from falls  Description: Will remain free from falls  4/23/2021 2305 by Veronique Beltrán RN  Outcome: Ongoing  4/23/2021 1207 by Meryle Danger, RN  Outcome: Ongoing  Note: Side rails up x 3. Bed locked and low position. Falling star program remains in place. Call light and personal belongings within reach. Frequent visual monitoring continues. Toileting program inplace. Patient assisted in turning/repositioning at least once every 2 hours, and on a prn basis. Goal: Absence of physical injury  Description: Absence of physical injury  4/23/2021 2305 by Veronique Beltrán RN  Outcome: Ongoing  4/23/2021 1207 by Meryle Danger, RN  Note: No physical injury during this shift. Problem: Skin Integrity:  Goal: Will show no infection signs and symptoms  Description: Will show no infection signs and symptoms  4/23/2021 2305 by Veronique Beltrán RN  Outcome: Ongoing  4/23/2021 1207 by Meryle Danger, RN  Outcome: Ongoing  Note: Reposition was performed every two hours and PRN basis. Skin care was provided. Goal: Absence of new skin breakdown  Description: Absence of new skin breakdown  4/23/2021 2305 by Veronique Beltrán RN  Outcome: Ongoing  4/23/2021 1207 by Meryle Danger, RN  Outcome: Ongoing  Note: No signs of new skin injury was noted. Problem: Breathing Pattern - Ineffective:  Goal: Ability to achieve and maintain a regular respiratory rate will improve  Description: Ability to achieve and maintain a regular respiratory rate will improve  4/23/2021 2305 by Veronique Beltrán RN  Outcome: Ongoing  4/23/2021 1207 by Meryle Danger, RN  Outcome: Ongoing  Note: Pt on nasal canula at 2 lt/min.      Problem: Discharge Planning:  Goal: Discharged to appropriate level of care  Description: Discharged to appropriate level of care  4/23/2021 2305 by Barbra Cotto RN  Outcome: Ongoing  4/23/2021 1207 by Goldie Saavedra RN  Outcome: Ongoing     Problem: Cardiac Output - Decreased:  Goal: Avoidance of environmental tobacco smoke  Description: Absence of orthostatic hypotension  4/23/2021 2305 by Barbra Cotto RN  Outcome: Ongoing  4/23/2021 1207 by Goldie Saavedra RN  Outcome: Ongoing  Goal: Cardiac output within specified parameters  Description: Cardiac output within specified parameters  4/23/2021 2305 by Barbra Cotto RN  Outcome: Ongoing  4/23/2021 1207 by Goldie Saavedra RN  Outcome: Ongoing  Goal: Hemodynamic stability will improve  Description: Hemodynamic stability will improve  4/23/2021 2305 by Barbra Cotto RN  Outcome: Ongoing  4/23/2021 1207 by Goldie Saavedra RN  Outcome: Ongoing     Problem: Fluid Volume - Imbalance:  Goal: Absence of imbalanced fluid volume signs and symptoms  Description: Absence of imbalanced fluid volume signs and symptoms  4/23/2021 2305 by Barbra Cotto RN  Outcome: Ongoing  4/23/2021 1207 by Goldie Saavedra RN  Outcome: Ongoing     Problem: Tissue Perfusion, Altered:  Goal: Circulatory function within specified parameters  Description: Circulatory function within specified parameters  4/23/2021 2305 by Barbra Cotto RN  Outcome: Ongoing  4/23/2021 1207 by Goldie Saavedra RN  Outcome: Ongoing     Problem: Tissue Perfusion - Cardiopulmonary, Altered:  Goal: Absence of angina  Description: Absence of angina  4/23/2021 2305 by Barbra Cotto RN  Outcome: Ongoing  4/23/2021 1207 by Goldie Saavedra RN  Outcome: Ongoing  Goal: Hemodynamic stability will improve  Description: Hemodynamic stability will improve  4/23/2021 2305 by Barbra Cotto RN  Outcome: Ongoing  4/23/2021 1207 by Goldie Saavedra RN  Outcome: Ongoing     Problem: Nutrition  Goal: Optimal nutrition therapy  4/23/2021 2305 by Edin Marie, RN  Outcome: Ongoing  4/23/2021 1207 by Grant Doty RN  Outcome: Ongoing  Note: Pt on NPO status at this time. Swallow Barium evaluation for today.

## 2021-04-24 NOTE — PROGRESS NOTES
Clinical Pharmacy Note  Heparin Dosing       Lab Results   Component Value Date    APTT 77.5 04/24/2021     Lab Results   Component Value Date    HGB 8.3 04/24/2021    HCT 26.6 04/24/2021     04/24/2021    INR 0.92 06/28/2015       Current Infusion Rate: 980 units/hr    Plan:  Rate: decrease to 940 units/hr  Next aPTT: 1400 4/24/21    Pharmacy will continue to monitor and adjust based on aPTT results.   Electronically signed by Tania Elam [facility] on 4/24/2021 at 7:42 AM

## 2021-04-24 NOTE — PROGRESS NOTES
Clinical Pharmacy Note  Vancomycin Consult    Guille Orourke is a 54 y.o. male ordered Vancomycin for pneumonia; consult received from Dr. Carmela Jovel to manage therapy. Also receiving meropenem and azithromycin. Patient Active Problem List   Diagnosis    Scoliosis    Muscular dystrophy ([facility] [address]    Narcotic abuse ([facility] [address]    Anxiety    Essential hypertension    Obstructive sleep apnea    COPD, moderate ([address]    GERD without esophagitis    Erectile dysfunction of organic origin    Nausea & vomiting    Diarrhea    Generalized weakness    Hyponatremia    Hypochloremia    Hyperglycemia    Pneumonia    Acute respiratory failure with hypoxia (HCC)    Elevated troponin    Acute respiratory failure with hypoxia and hypercapnia (HCC)    Abnormal chest x-ray    Shock ([address]       Allergies:  No known allergies     Temp max:  Temp (24hrs), Av.2 °F (36.8 °C), Min:97.5 °F (36.4 °C), Max:98.9 °F (37.2 °C)      Recent Labs     21  0453 21  0527 21  0455   WBC 12.8* 6.9 7.5       Recent Labs     21  0453 21  0527 21  0455   BUN 9 8 6*   CREATININE <0.5* <0.5* <0.5*         Intake/Output Summary (Last 24 hours) at 2021  Last data filed at 2021  Gross per 24 hour   Intake 1007.61 ml   Output 2185 ml   Net -1177.39 ml       Ht Readings from Last 1 Encounters:   21 5' (1.524 m)        Wt Readings from Last 1 Encounters:   21 125 lb (56.7 kg)         Assessment:  Level = 12.4  Will continue current dose. Thank you for the consult.      Alanis Turner RPh 2021 9:07 PM

## 2021-04-24 NOTE — PROGRESS NOTES
4 Eyes Skin Assessment     NAME:  Junior Cruz  YOB: 1965  MEDICAL RECORD NUMBER:  [id number]    The patient is being assess for  Transfer to New Unit    I agree that 2 RN's have performed a thorough Head to Toe Skin Assessment on the patient. ALL assessment sites listed below have been assessed. Areas assessed by both nurses:    Head, Face, Ears, Shoulders, Back, Chest, Arms, Elbows, Hands, Sacrum. Buttock, Coccyx, Ischium and Legs. Feet and Heels        Does the Patient have a Wound?  No noted wound(s)       Graeme Prevention initiated:  Yes  Wound Care Orders initiated:  No    Pressure Injury (Stage 3,4, Unstageable, DTI, NWPT, and Complex wounds) if present place consult order under [de-identified] No    New and Established Ostomies if present place consult order under : No      Nurse 1 eSignature: Electronically signed by Alban Hurt RN on 4/23/21 at 10:59 PM EDT    **SHARE this note so that the co-signing nurse is able to place an eSignature**    Nurse 2 eSignature: Electronically signed by Bethany Brock RN on 4/24/21 at 1:28 AM EDT

## 2021-04-24 NOTE — PROGRESS NOTES
PT wants to eat, even if it means that he dies. Mother agreeable to [facility] since he is at extremely high risk for aspiration and death.     Judith Domingo MD  4/24/2021  8525

## 2021-04-24 NOTE — PROGRESS NOTES
Physical Therapy  PT referral received and chart reviewed. Upon arrival, pt leaving floor for Stress Test.  Will follow up later today or tomorrow.   Thank you, Priyanka Overton PT [id number]

## 2021-04-24 NOTE — PROGRESS NOTES
Occupational Therapy  Attempt Note    Cheri Davey  4/24/2021    OT orders received. OT attempted to see for OT eval/tx, but was unable to see secondary to pt leaving room for stress test . Will attempt again later as time permits.     XPlace Meter, OTR/L [id number]

## 2021-04-24 NOTE — PROGRESS NOTES
Elevated troponin    Acute respiratory failure with hypoxia and hypercapnia (HCC)    Abnormal chest x-ray    Shock ([address]  Resolved Problems:    * No resolved hospital problems. *      Plan:  1. Acute on chronic hypercapnic resp failure likely due to muscular dystrophy - now on 2L oxygen  2. Muscular dystrophy - poor prognosis due to progression over past year  3. Elevated troponin -  Likely demand ischemia - stress test normal  4. Nausea and vomiting after stress test - follow and offer anti-emetics  5.   Dysphagia - not cleared to eat at this time - will place feeding tube if he agrees - mother wants to talk him    Prognosis:  Guarded    Code status:  DNR/CCA  - limited    DVT prophylaxis: Heparin IV  GI prophylaxis: none    Diet:  Diet NPO, After Midnight    Disposition:  SNF    Medications:  Scheduled Meds:   acetaZOLAMIDE  500 mg Oral Daily    meropenem  500 mg Intravenous Q6H    fluticasone  2 spray Each Nostril Daily    DULoxetine  90 mg Oral Daily    vancomycin  750 mg Intravenous Q12H    ipratropium-albuterol  1 ampule Inhalation Q4H    sodium chloride (Inhalant)  15 mL Nebulization TID    metoprolol tartrate  12.5 mg Per NG tube BID    aspirin  81 mg Per NG tube Daily    vancomycin (VANCOCIN) intermittent dosing (placeholder)   Other RX Placeholder    sodium chloride flush  10 mL Intravenous 2 times per day       PRN Meds:  sodium phosphate IVPB **OR** sodium phosphate IVPB, polyvinyl alcohol, potassium chloride, magnesium sulfate, sodium chloride flush, sodium chloride, ondansetron, polyethylene glycol, acetaminophen **OR** acetaminophen, albuterol sulfate HFA    IV:   heparin (PORCINE) Infusion 980 Units/hr (04/24/21 0636)    sodium chloride           Intake/Output Summary (Last 24 hours) at 4/24/2021 1035  Last data filed at 4/24/2021 0636  Gross per 24 hour   Intake 1878.41 ml   Output 2300 ml   Net -421.59 ml       Results:  CBC:   Recent Labs     04/22/21  0527 04/23/21  9470 04/24/21  0615   WBC 6.9 7.5 7.6   HGB 8.7* 9.7* 8.3*   HCT 27.2* 30.6* 26.6*   MCV 80.7 80.6 82.8    287 274     BMP:   Recent Labs     04/22/21  0527 04/23/21  0455 04/24/21  0601   * 138 137   K 3.6 3.8 3.8   CL 98* 96* 97*   CO2 28 35* 32   PHOS 2.5 3.2 3.4   BUN 8 6* 3*   CREATININE <0.5* <0.5* <0.5*     Mag: No results for input(s): MAG in the last 72 hours. Phos:   Lab Results   Component Value Date    PHOS 3.4 04/24/2021     No components found for: GLU    LIVER PROFILE: No results for input(s): AST, ALT, LIPASE, BILIDIR, BILITOT, ALKPHOS in the last 72 hours. Invalid input(s): AMYLASE,  ALB  PT/INR: No results for input(s): PROTIME, INR in the last 72 hours. APTT:   Recent Labs     04/22/21 2215 04/23/21  0455 04/24/21  0601   APTT 66.0* 68.9* 77.5*     UA:No results for input(s): NITRITE, COLORU, PHUR, LABCAST, WBCUA, RBCUA, MUCUS, TRICHOMONAS, YEAST, BACTERIA, CLARITYU, SPECGRAV, LEUKOCYTESUR, UROBILINOGEN, BILIRUBINUR, BLOODU, GLUCOSEU, AMORPHOUS in the last 72 hours.     Invalid input(s): Clovia Gone input(s): ABG  Lab Results   Component Value Date    CALCIUM 8.8 04/24/2021    PHOS 3.4 04/24/2021             Electronically signed by Aaron Pastor MD on 4/24/2021 at 10:35 AM

## 2021-04-24 NOTE — PROGRESS NOTES
Mom at bedside and very tearful about pt`s decision to not do the NG tube. He wishes to eat and drink. Dr Mary Crowell notified-\"So he needs to be complete [facility]. I think he is CCA now. Does his mom understand? I had detailed conversation with her\". I believe mom is aware of pt`s decision.  Electronically signed by Samuel Rosenbaum RN on 4/24/2021 at 4:57 PM

## 2021-04-24 NOTE — PROGRESS NOTES
Clinical Pharmacy Note  Heparin Dosing       Lab Results   Component Value Date    APTT 68.2 04/24/2021     Lab Results   Component Value Date    HGB 8.3 04/24/2021    HCT 26.6 04/24/2021     04/24/2021    INR 0.92 06/28/2015       Current Infusion Rate: 940 units/hr    Plan:  Rate: continue 940 units/hr  Next aPTT: 2100 4/24/21    Pharmacy will continue to monitor and adjust based on aPTT results.   Electronically signed by Eliazar Gamez [facility] on 4/24/2021 at 3:55 PM

## 2021-04-24 NOTE — PROGRESS NOTES
[address]   Daily Progress Note      Admit Date:  4/17/2021      Subjective:   Mr. Ave Barker is a 51yo male with a past medical history significant for COPD and essential hypertension who presented to the [facility] ED via squad with respiratory failure. He is felt to have some mucous plugging and overnight required an increase in his FiO2 to 80%. This is been weaned down to 60% now. We were asked to evaluate him for elevated troponin assays found incidentally along with some ECG changes.     Interval history:  Rylee Cornelius reports that he is feeling better. He says his breathing is better. He does have a strong cough but apparently failed a swallow test this morning. No events overnight. Sinus rhythm on telemetry. He has no complaints of chest pain. Objective:     /71   Pulse 101   Temp 98.8 °F (37.1 °C) (Axillary)   Resp 18   Ht 5' (1.524 m)   Wt 113 lb 1.5 oz (51.3 kg)   SpO2 96%   BMI 22.09 kg/m²      Intake/Output Summary (Last 24 hours) at 4/24/2021 1519  Last data filed at 4/24/2021 1141  Gross per 24 hour   Intake 1878.41 ml   Output 2600 ml   Net -721.59 ml       Physical Exam:  General:  Awake, alert, NAD  Skin:  Warm and dry  Neck:  JVD<8, no carotid bruits  Chest: On BiPAP.   Diminished bilaterally no wheezes/rhonchi/rales  Cardiovascular:  RRR, normal S1/S2, no M/R/G  Abdomen:  Soft, nontender, +bowel sounds  Extremities:  No edema  Pulses: 2+ bilat carotid    2+ bilat radial    2+ bilat femoral        Medications:    acetaZOLAMIDE  500 mg Oral Daily    meropenem  500 mg Intravenous Q6H    fluticasone  2 spray Each Nostril Daily    DULoxetine  90 mg Oral Daily    vancomycin  750 mg Intravenous Q12H    ipratropium-albuterol  1 ampule Inhalation Q4H    sodium chloride (Inhalant)  15 mL Nebulization TID    metoprolol tartrate  12.5 mg Per NG tube BID    aspirin  81 mg Per NG tube Daily    vancomycin (VANCOCIN) intermittent dosing (placeholder)   Other RX Placeholder    sodium chloride flush  10 mL Intravenous 2 times per day      heparin (PORCINE) Infusion 980 Units/hr (04/24/21 0636)    sodium chloride         Lab Data:  CBC:   Recent Labs     04/22/21  0527 04/23/21  0455 04/24/21  0615   WBC 6.9 7.5 7.6   HGB 8.7* 9.7* 8.3*    287 274     BMP:    Recent Labs     04/22/21  0527 04/23/21  0455 04/24/21  0601   * 138 137   K 3.6 3.8 3.8   CO2 28 35* 32   BUN 8 6* 3*   CREATININE <0.5* <0.5* <0.5*     Recent Labs     04/22/21  2215 04/23/21  0455 04/24/21  0601   APTT 66.0* 68.9* 77.5*     BNP:  No results for input(s): BNP in the last 72 hours. CARDIAC ENZYMES:  Recent Labs     04/23/21 0455   TROPONINI 0.03*     FASTING LIPID PANEL:  Lab Results   Component Value Date    CHOL 163 08/26/2019    HDL 75 08/26/2019    TRIG 75 08/26/2019     Echo 4/19/2021:  Ejection fraction is visually estimated to be 55-60%. Normal diastolic function. Severly dilated right ventricle. Right ventricular systolic function is normal   No significant valvular heart disease      Assessment:  1. Non-ST elevation MI  2. Acute respiratory failure with hypoxia      Plan:   [place] stress showed no significant ischemia with a normal left ventricular ejection fraction of 75%. I do not think his issues were cardiac at all and his elevated troponin was likely just muscular in nature. I agree that he is a very poor prognosis and would not disagree with further end of life care discussions. We will sign off for now. Please feel free to call with any concerns or questions.         Signed:  Hanny Quezada MD

## 2021-04-25 LAB
ANION GAP SERPL CALCULATED.3IONS-SCNC: 5 MMOL/L (ref 3–16)
BASOPHILS ABSOLUTE: 0.1 K/UL (ref 0–0.2)
BASOPHILS RELATIVE PERCENT: 0.9 %
BUN BLDV-MCNC: 5 MG/DL (ref 7–20)
CALCIUM SERPL-MCNC: 8.7 MG/DL (ref 8.3–10.6)
CHLORIDE BLD-SCNC: 97 MMOL/L (ref 99–110)
CO2: 38 MMOL/L (ref 21–32)
CREAT SERPL-MCNC: <0.5 MG/DL (ref 0.9–1.3)
EOSINOPHILS ABSOLUTE: 0.2 K/UL (ref 0–0.6)
EOSINOPHILS RELATIVE PERCENT: 3 %
GFR AFRICAN AMERICAN: >60
GFR NON-AFRICAN AMERICAN: >60
GLUCOSE BLD-MCNC: 97 MG/DL (ref 70–99)
HCT VFR BLD CALC: 31.1 % (ref 40.5–52.5)
HEMOGLOBIN: 9.6 G/DL (ref 13.5–17.5)
LYMPHOCYTES ABSOLUTE: 0.7 K/UL (ref 1–5.1)
LYMPHOCYTES RELATIVE PERCENT: 9.3 %
MAGNESIUM: 2.1 MG/DL (ref 1.8–2.4)
MCH RBC QN AUTO: 25.5 PG (ref 26–34)
MCHC RBC AUTO-ENTMCNC: 30.9 G/DL (ref 31–36)
MCV RBC AUTO: 82.4 FL (ref 80–100)
MONOCYTES ABSOLUTE: 0.3 K/UL (ref 0–1.3)
MONOCYTES RELATIVE PERCENT: 4.2 %
NEUTROPHILS ABSOLUTE: 6 K/UL (ref 1.7–7.7)
NEUTROPHILS RELATIVE PERCENT: 82.6 %
PDW BLD-RTO: 16 % (ref 12.4–15.4)
PHOSPHORUS: 2.8 MG/DL (ref 2.5–4.9)
PLATELET # BLD: 379 K/UL (ref 135–450)
PMV BLD AUTO: 7.7 FL (ref 5–10.5)
POTASSIUM REFLEX MAGNESIUM: 3.8 MMOL/L (ref 3.5–5.1)
RBC # BLD: 3.77 M/UL (ref 4.2–5.9)
SODIUM BLD-SCNC: 140 MMOL/L (ref 136–145)
WBC # BLD: 7.3 K/UL (ref 4–11)

## 2021-04-25 PROCEDURE — 94760 N-INVAS EAR/PLS OXIMETRY 1: CPT

## 2021-04-25 PROCEDURE — 85025 COMPLETE CBC W/AUTO DIFF WBC: CPT

## 2021-04-25 PROCEDURE — 2700000000 HC OXYGEN THERAPY PER DAY

## 2021-04-25 PROCEDURE — 2580000003 HC RX 258: Performed by: INTERNAL MEDICINE

## 2021-04-25 PROCEDURE — 94640 AIRWAY INHALATION TREATMENT: CPT

## 2021-04-25 PROCEDURE — 2580000003 HC RX 258: Performed by: NURSE PRACTITIONER

## 2021-04-25 PROCEDURE — 84100 ASSAY OF PHOSPHORUS: CPT

## 2021-04-25 PROCEDURE — 80048 BASIC METABOLIC PNL TOTAL CA: CPT

## 2021-04-25 PROCEDURE — 2060000000 HC ICU INTERMEDIATE R&B

## 2021-04-25 PROCEDURE — 83735 ASSAY OF MAGNESIUM: CPT

## 2021-04-25 PROCEDURE — 6370000000 HC RX 637 (ALT 250 FOR IP): Performed by: NURSE PRACTITIONER

## 2021-04-25 RX ADMIN — SODIUM CHLORIDE, PRESERVATIVE FREE 10 ML: 5 INJECTION INTRAVENOUS at 21:16

## 2021-04-25 RX ADMIN — SODIUM CHLORIDE SOLN NEBU 3% 4 ML: 3 NEBU SOLN at 10:03

## 2021-04-25 RX ADMIN — IPRATROPIUM BROMIDE AND ALBUTEROL SULFATE 1 AMPULE: .5; 3 SOLUTION RESPIRATORY (INHALATION) at 16:59

## 2021-04-25 RX ADMIN — SODIUM CHLORIDE SOLN NEBU 3% 15 ML: 3 NEBU SOLN at 20:28

## 2021-04-25 RX ADMIN — IPRATROPIUM BROMIDE AND ALBUTEROL SULFATE 1 AMPULE: .5; 3 SOLUTION RESPIRATORY (INHALATION) at 20:28

## 2021-04-25 RX ADMIN — IPRATROPIUM BROMIDE AND ALBUTEROL SULFATE 1 AMPULE: .5; 3 SOLUTION RESPIRATORY (INHALATION) at 10:03

## 2021-04-25 RX ADMIN — SODIUM CHLORIDE SOLN NEBU 3% 4 ML: 3 NEBU SOLN at 14:15

## 2021-04-25 RX ADMIN — IPRATROPIUM BROMIDE AND ALBUTEROL SULFATE 1 AMPULE: .5; 3 SOLUTION RESPIRATORY (INHALATION) at 14:15

## 2021-04-25 ASSESSMENT — PAIN SCALES - GENERAL
PAINLEVEL_OUTOF10: 0
PAINLEVEL_OUTOF10: 0

## 2021-04-25 NOTE — PROGRESS NOTES
Physical Therapy  PT referral received and chart reviewed. Spoke with nursing who reports cont decline overall status. OK to see if pt/family agreeable. To bedside, pt/family sleeping soundly. Did not wish to disturb. Will check back tomorrow.   Thank you, Lauren Hudson, PT [id number]

## 2021-04-25 NOTE — PROGRESS NOTES
Hospitalist Progress Note      PCP: DELISA Bueno - CNP    Date of Admission: 4/17/2021    Chief Complaint: SOB    Hospital Course: Patient is a 42-year-old male with a past medical history of muscular dystrophy, COPD, hypertension who presented to the hospital with shortness of breath. Initially on nasal cannula and then requiring BiPAP. Patient refusing BiPAP and eventually rapid response called and patient was intubated. 4/18 Patient currently intubated and sedated on pressors. Per family patient's muscular dystrophy has been progressing over the past 6 months including his pulmonary and myocardial tissues. Mother and aunt at bedside and discussed long road ahead in regards to getting him off the ventilator if he has advanced muscular dystrophy    4/19 Patient awake alert on the vent to later today. Patient is following commands. Discussed that we will try and continue to wean him off the ventilator but this may be an issue with his history of muscular dystrophy. He is currently now on only Levophed and we will continue to titrate this off.    4/21  Patient still requiring the ventilator with 60% FiO2 and a PEEP of 5. Continue to titrate off ventilator in hopes of possible spontaneous breathing trial.  Patient underwent bronchoscopy on April 20 with bilateral lower lobe pneumonia. 4/22 Patient tolerating spontaneous breathing trial with plan to extubate later today. 4/23  Patient extubated and on 2 L oxygen. Patient continues to make good urine output with over 5 L. Continue IV antibiotics for total of 7 days and then stop. Subjective: Patient seen and examined. Patient now down to 1 L of oxygen. Doing just fine. We will have physical and Occupational Therapy assessed the patient. Patient may need extra help at discharge. Advised n.p.o. status but family would like for him to be able to eat.   Went over the risk first benefit and patient states he would still like to continue eating. Medications:  Reviewed    Infusion Medications    sodium chloride       Scheduled Medications    fluticasone  2 spray Each Nostril Daily    DULoxetine  90 mg Oral Daily    ipratropium-albuterol  1 ampule Inhalation Q4H    sodium chloride (Inhalant)  15 mL Nebulization TID    metoprolol tartrate  12.5 mg Per NG tube BID    aspirin  81 mg Per NG tube Daily    vancomycin (VANCOCIN) intermittent dosing (placeholder)   Other RX Placeholder    sodium chloride flush  10 mL Intravenous 2 times per day     PRN Meds: sodium phosphate IVPB **OR** sodium phosphate IVPB, polyvinyl alcohol, potassium chloride, magnesium sulfate, sodium chloride flush, sodium chloride, ondansetron, polyethylene glycol, acetaminophen **OR** acetaminophen, albuterol sulfate HFA      Intake/Output Summary (Last 24 hours) at 4/25/2021 0811  Last data filed at 4/25/2021 0541  Gross per 24 hour   Intake 542 ml   Output 1590 ml   Net -1048 ml       Physical Exam Performed:    /60   Pulse 81   Temp 97.5 °F (36.4 °C) (Oral)   Resp 17   Ht 5' (1.524 m)   Wt 112 lb 10.5 oz (51.1 kg)   SpO2 97%   BMI 22.00 kg/m²     General appearance: No apparent distress  HEENT: Pupils equal, round, and reactive to light. Conjunctivae/corneas clear. Neck: Supple, with full range of motion. No jugular venous distention. Trachea midline. Respiratory:  Normal respiratory effort. Diminished breath sounds bilateral cardiovascular: Regular rate and rhythm with normal S1/S2 without murmurs, rubs or gallops. Abdomen: Soft, non-tender, non-distended with normal bowel sounds. Musculoskeletal: No clubbing, cyanosis or edema bilaterally. Skin: Skin color, texture, turgor normal.  No rashes or lesions.   Neurologic: No focal deficits  Psychiatric: Alert and oriented x3  Capillary Refill: Brisk,< 3 seconds   Peripheral Pulses: +2 palpable, equal bilaterally       Labs:   Recent Labs     04/23/21  0455 04/24/21  0615 04/25/21  0545   WBC 7.5 7.6 7.3   HGB 9.7* 8.3* 9.6*   HCT 30.6* 26.6* 31.1*    274 379     Recent Labs     04/23/21  0455 04/24/21  0601 04/25/21  0545    137 140   K 3.8 3.8 3.8   CL 96* 97* 97*   CO2 35* 32 38*   BUN 6* 3* 5*   CREATININE <0.5* <0.5* <0.5*   CALCIUM 8.4 8.8 8.7   PHOS 3.2 3.4 2.8     No results for input(s): AST, ALT, BILIDIR, BILITOT, ALKPHOS in the last 72 hours. No results for input(s): INR in the last 72 hours. Recent Labs     04/23/21 0455   TROPONINI 0.03*       Urinalysis:      Lab Results   Component Value Date    NITRU Negative 04/18/2021    WBCUA 10 04/18/2021    BACTERIA 3+ 11/30/2014    RBCUA 45 04/18/2021    BLOODU MODERATE 04/18/2021    SPECGRAV >1.030 04/18/2021    GLUCOSEU 250 04/18/2021    GLUCOSEU NEGATIVE 03/19/2011       Radiology:  NM Cardiac Stress Test Nuclear Imaging   Final Result      Fluoroscopy modified barium swallow with video   Final Result   1. Moderate laryngeal penetration without tracheal aspiration of honey   thickened liquids. 2. Flash laryngeal penetration without tracheal aspiration of purees. 3. Progressive coating and pooling of residual barium product within the   piriform sinuses and posterior pharyngeal wall, especially with purees. Please see separate speech pathology report for full discussion of findings   and recommendations. XR CHEST PORTABLE   Final Result   1. Stable volume of mild-to-moderate bilateral layering pleural effusions. 2. Mild worsening of bilateral perihilar ill-defined consolidation most   suggestive of alveolar edema. 3. Moderate cardiomegaly. 4. Recommend retraction of endotracheal tube 2.5 cm. XR CHEST PORTABLE   Final Result   Stable perihilar edema with bilateral pleural effusions and bibasilar volume   loss. Enteric catheter tip in the distal gastric body or gastric antrum just below   the lower margin of the image.          XR CHEST PORTABLE   Final Result   Increasing bilateral lower lobe airspace

## 2021-04-25 NOTE — PLAN OF CARE
Problem: Falls - Risk of:  Goal: Will remain free from falls  Description: Will remain free from falls  Outcome: Ongoing     Problem: Skin Integrity:  Goal: Will show no infection signs and symptoms  Description: Will show no infection signs and symptoms  Outcome: Ongoing     Problem: Breathing Pattern - Ineffective:  Goal: Ability to achieve and maintain a regular respiratory rate will improve  Description: Ability to achieve and maintain a regular respiratory rate will improve  Outcome: Ongoing     Problem: Cardiac Output - Decreased:  Goal: Avoidance of environmental tobacco smoke  Description: Absence of orthostatic hypotension  Outcome: Ongoing

## 2021-04-26 VITALS
WEIGHT: 93.25 LBS | SYSTOLIC BLOOD PRESSURE: 149 MMHG | BODY MASS INDEX: 18.31 KG/M2 | HEART RATE: 89 BPM | TEMPERATURE: 97.9 F | DIASTOLIC BLOOD PRESSURE: 88 MMHG | HEIGHT: 60 IN | RESPIRATION RATE: 18 BRPM | OXYGEN SATURATION: 95 %

## 2021-04-26 LAB
ANION GAP SERPL CALCULATED.3IONS-SCNC: 5 MMOL/L (ref 3–16)
BASOPHILS ABSOLUTE: 0.1 K/UL (ref 0–0.2)
BASOPHILS RELATIVE PERCENT: 1.2 %
BUN BLDV-MCNC: 7 MG/DL (ref 7–20)
CALCIUM SERPL-MCNC: 8.9 MG/DL (ref 8.3–10.6)
CHLORIDE BLD-SCNC: 95 MMOL/L (ref 99–110)
CO2: 42 MMOL/L (ref 21–32)
CREAT SERPL-MCNC: <0.5 MG/DL (ref 0.9–1.3)
EOSINOPHILS ABSOLUTE: 0.2 K/UL (ref 0–0.6)
EOSINOPHILS RELATIVE PERCENT: 3.4 %
GFR AFRICAN AMERICAN: >60
GFR NON-AFRICAN AMERICAN: >60
GLUCOSE BLD-MCNC: 92 MG/DL (ref 70–99)
HCT VFR BLD CALC: 32.1 % (ref 40.5–52.5)
HEMOGLOBIN: 10 G/DL (ref 13.5–17.5)
LYMPHOCYTES ABSOLUTE: 1.3 K/UL (ref 1–5.1)
LYMPHOCYTES RELATIVE PERCENT: 20.7 %
MAGNESIUM: 1.7 MG/DL (ref 1.8–2.4)
MCH RBC QN AUTO: 25.7 PG (ref 26–34)
MCHC RBC AUTO-ENTMCNC: 31.2 G/DL (ref 31–36)
MCV RBC AUTO: 82.4 FL (ref 80–100)
MONOCYTES ABSOLUTE: 0.4 K/UL (ref 0–1.3)
MONOCYTES RELATIVE PERCENT: 7.4 %
NEUTROPHILS ABSOLUTE: 4.1 K/UL (ref 1.7–7.7)
NEUTROPHILS RELATIVE PERCENT: 67.3 %
PDW BLD-RTO: 16.4 % (ref 12.4–15.4)
PHOSPHORUS: 2.6 MG/DL (ref 2.5–4.9)
PLATELET # BLD: 439 K/UL (ref 135–450)
PMV BLD AUTO: 7.4 FL (ref 5–10.5)
POTASSIUM REFLEX MAGNESIUM: 3.6 MMOL/L (ref 3.5–5.1)
RBC # BLD: 3.9 M/UL (ref 4.2–5.9)
SODIUM BLD-SCNC: 142 MMOL/L (ref 136–145)
WBC # BLD: 6.1 K/UL (ref 4–11)

## 2021-04-26 PROCEDURE — 94640 AIRWAY INHALATION TREATMENT: CPT

## 2021-04-26 PROCEDURE — 2700000000 HC OXYGEN THERAPY PER DAY

## 2021-04-26 PROCEDURE — 85025 COMPLETE CBC W/AUTO DIFF WBC: CPT

## 2021-04-26 PROCEDURE — 6370000000 HC RX 637 (ALT 250 FOR IP): Performed by: NURSE PRACTITIONER

## 2021-04-26 PROCEDURE — 2580000003 HC RX 258: Performed by: NURSE PRACTITIONER

## 2021-04-26 PROCEDURE — 2580000003 HC RX 258: Performed by: INTERNAL MEDICINE

## 2021-04-26 PROCEDURE — 83735 ASSAY OF MAGNESIUM: CPT

## 2021-04-26 PROCEDURE — 36592 COLLECT BLOOD FROM PICC: CPT

## 2021-04-26 PROCEDURE — 80048 BASIC METABOLIC PNL TOTAL CA: CPT

## 2021-04-26 PROCEDURE — 84100 ASSAY OF PHOSPHORUS: CPT

## 2021-04-26 PROCEDURE — 94761 N-INVAS EAR/PLS OXIMETRY MLT: CPT

## 2021-04-26 PROCEDURE — 6360000002 HC RX W HCPCS: Performed by: INTERNAL MEDICINE

## 2021-04-26 RX ORDER — MORPHINE SULFATE 2 MG/ML
2 INJECTION, SOLUTION INTRAMUSCULAR; INTRAVENOUS EVERY 4 HOURS PRN
Status: DISCONTINUED | OUTPATIENT
Start: 2021-04-26 | End: 2021-04-26 | Stop reason: HOSPADM

## 2021-04-26 RX ADMIN — SODIUM CHLORIDE SOLN NEBU 3% 4 ML: 3 NEBU SOLN at 12:14

## 2021-04-26 RX ADMIN — IPRATROPIUM BROMIDE AND ALBUTEROL SULFATE 1 AMPULE: .5; 3 SOLUTION RESPIRATORY (INHALATION) at 00:33

## 2021-04-26 RX ADMIN — SODIUM CHLORIDE, PRESERVATIVE FREE 10 ML: 5 INJECTION INTRAVENOUS at 09:10

## 2021-04-26 RX ADMIN — MORPHINE SULFATE 2 MG: 2 INJECTION, SOLUTION INTRAMUSCULAR; INTRAVENOUS at 16:18

## 2021-04-26 RX ADMIN — IPRATROPIUM BROMIDE AND ALBUTEROL SULFATE 1 AMPULE: .5; 3 SOLUTION RESPIRATORY (INHALATION) at 03:58

## 2021-04-26 RX ADMIN — SODIUM CHLORIDE SOLN NEBU 3% 4 ML: 3 NEBU SOLN at 08:43

## 2021-04-26 RX ADMIN — IPRATROPIUM BROMIDE AND ALBUTEROL SULFATE 1 AMPULE: .5; 3 SOLUTION RESPIRATORY (INHALATION) at 12:14

## 2021-04-26 RX ADMIN — IPRATROPIUM BROMIDE AND ALBUTEROL SULFATE 1 AMPULE: .5; 3 SOLUTION RESPIRATORY (INHALATION) at 08:43

## 2021-04-26 ASSESSMENT — PAIN SCALES - GENERAL: PAINLEVEL_OUTOF10: 8

## 2021-04-26 NOTE — PLAN OF CARE
Nutrition Problem #1: Inadequate oral intake  Intervention: Food and/or Nutrient Delivery: Continue Current Diet  Nutritional Goals:  Tolerate most appropriate form of nutrition per provider

## 2021-04-26 NOTE — PROGRESS NOTES
Occupational Therapy  Pt continues to decline medically at this time. RN requesting to hold. Family discussing possible hospice options. Will continue to follow until decision is made.     Becki Gomez, OTR/L

## 2021-04-26 NOTE — CARE COORDINATION
[facility]  Palliative Care   Progress Note    NAME:  Junior Cruz  MEDICAL RECORD NUMBER:  [id number]  AGE: 54 y.o. GENDER: male  : 1965  TODAY'S DATE:  2021    Subjective: Patient resting in bed, will wake up to answer questions. Objective:    Vitals:    21 1214   BP:    Pulse:    Resp: 18   Temp:    SpO2: 96%     Lab Results   Component Value Date    WBC 6.1 2021    HGB 10.0 (L) 2021    HCT 32.1 (L) 2021    MCV 82.4 2021     2021     Lab Results   Component Value Date    CREATININE <0.5 (L) 2021    BUN 7 2021     2021    K 3.6 2021    CL 95 (L) 2021    CO2 42 (H) 2021     Lab Results   Component Value Date    ALT 23 2021    AST 18 2021    ALKPHOS 66 2021    BILITOT <0.2 2021       Plan: I have spoken to patient he understands he will continue to have asp pneumonia but he wants to eat as opposed to placing a feeding tube. We have discussed what he wants for himself at this time and he agrees he wants comfort care he no longer wants to do therapy. He is agreeable to hospice consult at this time and OK with me calling his mother to let her know of his decision. The Plan for Transition of Care is related to the following treatment goals: Hospice care    The Patient and/or patient was provided with a choice of provider and agrees   with the discharge plan. [x] Yes [] No    Freedom of choice list was provided with basic dialogue that supports the patient's individualized plan of care/goals, treatment preferences and shares the quality data associated with the providers.  [x] Yes [] No   Dr Ben Frausto informed of above orders noted referral faxed,   Code Status: DNR-CC  Discharge Environment:  [x] Hospice Consult Agency: List provided chose [facility]   [x] Inpatient Hospice  vs  [x] Home with Hospice Care   Teaching Time:  1hours  0    I will continue to follow  Digna's care as needed. Thank you for allowing me to participate in the care of Mr. Gregoria Underwood .      Electronically signed by González Thompson RN, [address] [place] [address] on 4/26/2021 at 1:04 PM  [address] [place] [address]  Office: [phone]

## 2021-04-26 NOTE — PLAN OF CARE
Problem: Falls - Risk of:  Goal: Will remain free from falls  Description: Will remain free from falls  Outcome: Ongoing  Goal: Absence of physical injury  Description: Absence of physical injury  Outcome: Ongoing     Problem: Skin Integrity:  Goal: Will show no infection signs and symptoms  Description: Will show no infection signs and symptoms  Outcome: Ongoing  Goal: Absence of new skin breakdown  Description: Absence of new skin breakdown  Outcome: Ongoing     Problem: Breathing Pattern - Ineffective:  Goal: Ability to achieve and maintain a regular respiratory rate will improve  Description: Ability to achieve and maintain a regular respiratory rate will improve  Outcome: Ongoing     Problem: Discharge Planning:  Goal: Discharged to appropriate level of care  Description: Discharged to appropriate level of care  Outcome: Ongoing     Problem: Cardiac Output - Decreased:  Goal: Avoidance of environmental tobacco smoke  Description: Absence of orthostatic hypotension  Outcome: Ongoing  Goal: Cardiac output within specified parameters  Description: Cardiac output within specified parameters  Outcome: Ongoing  Goal: Hemodynamic stability will improve  Description: Hemodynamic stability will improve  Outcome: Ongoing     Problem: Fluid Volume - Imbalance:  Goal: Absence of imbalanced fluid volume signs and symptoms  Description: Absence of imbalanced fluid volume signs and symptoms  Outcome: Ongoing     Problem: Tissue Perfusion, Altered:  Goal: Circulatory function within specified parameters  Description: Circulatory function within specified parameters  Outcome: Ongoing     Problem: Tissue Perfusion - Cardiopulmonary, Altered:  Goal: Absence of angina  Description: Absence of angina  Outcome: Ongoing  Goal: Hemodynamic stability will improve  Description: Hemodynamic stability will improve  Outcome: Ongoing     Problem: Nutrition  Goal: Optimal nutrition therapy  Outcome: Ongoing

## 2021-04-26 NOTE — CARE COORDINATION
Spoke with Palliative Care RN. Patient provided Hospice List. Patient chose [facility]. Called to Lizzette Cox with [address], confirmed transportation time.   CASE MANAGEMENT DISCHARGE SUMMARY:    DISCHARGE DATE: 4/26/2021    [facility]GED TO: [facility] [address] [facility]               REPORT NUMBER: [phone]               FAX NUMBER: [phone]    TRANSPORTATION: [facility]              TIME: 7:45 PM    PREFERRED PHARMACY: At facility     MAGGIE Updated    SHANTE Law, FELICIAW, Social Work/Case Management   [phone]  Electronically signed by SHANTE Law, BONG on 4/26/2021 at 3:54 PM

## 2021-04-26 NOTE — PROGRESS NOTES
Transport arrived to bring Patient to [facility]. Report provided by this RN. Patient left with camp and IJ in place. Transporters were given the patient's belongings. Tele box was removed from patient.

## 2021-04-26 NOTE — PROGRESS NOTES
Speech Language Pathology    Dysphagia tx/follow-up attempted. Patient met at bedside sleeping soundly. Patient awakens to auditory and tactile stim but quickly returns to sleep. Chart review indicates patient declining NPO recommendation with plan to pursue hospice care. ST to re-attempt pending goals of care status. Thank you. Gretchen Grove, [address] [facility], [phone]  Speech-Language Pathologist

## 2021-04-26 NOTE — CARE COORDINATION
[facility] inpatient care center by CHRISTOPHER FLORENCE [facility] at [facility]. Junior Jarrett SINGH, [address], [facility], [address] [place] [address]   O: [phone]  C: [phone]  F: [phone]   Main & Referrals: [phone]   [facility]. [facility] 

## 2021-04-26 NOTE — PROGRESS NOTES
Hospitalist Progress Note      PCP: Zach Lanza, DELISA - CNP    Date of Admission: 4/17/2021    Chief Complaint: SOB    Hospital Course: Patient is a 59-year-old male with a past medical history of muscular dystrophy, COPD, hypertension who presented to the hospital with shortness of breath. Initially on nasal cannula and then requiring BiPAP. Patient refusing BiPAP and eventually rapid response called and patient was intubated. 4/18 Patient currently intubated and sedated on pressors. Per family patient's muscular dystrophy has been progressing over the past 6 months including his pulmonary and myocardial tissues. Mother and aunt at bedside and discussed long road ahead in regards to getting him off the ventilator if he has advanced muscular dystrophy    4/19 Patient awake alert on the vent to later today. Patient is following commands. Discussed that we will try and continue to wean him off the ventilator but this may be an issue with his history of muscular dystrophy. He is currently now on only Levophed and we will continue to titrate this off.    4/21  Patient still requiring the ventilator with 60% FiO2 and a PEEP of 5. Continue to titrate off ventilator in hopes of possible spontaneous breathing trial.  Patient underwent bronchoscopy on April 20 with bilateral lower lobe pneumonia. 4/22 Patient tolerating spontaneous breathing trial with plan to extubate later today. 4/23  Patient extubated and on 2 L oxygen. Patient continues to make good urine output with over 5 L. Continue IV antibiotics for total of 7 days and then stop. Subjective: Patient seen and examined. Patient now down to 1 L of oxygen. Doing just fine. We will have physical and Occupational Therapy assessed the patient. Patient may need extra help at discharge. Advised n.p.o. status but family would like for him to be able to eat.   Went over the risk first benefit and patient states he would still like to continue eating. Medications:  Reviewed    Infusion Medications    sodium chloride       Scheduled Medications    fluticasone  2 spray Each Nostril Daily    DULoxetine  90 mg Oral Daily    ipratropium-albuterol  1 ampule Inhalation Q4H    sodium chloride (Inhalant)  15 mL Nebulization TID    metoprolol tartrate  12.5 mg Per NG tube BID    aspirin  81 mg Per NG tube Daily    sodium chloride flush  10 mL Intravenous 2 times per day     PRN Meds: sodium phosphate IVPB **OR** sodium phosphate IVPB, polyvinyl alcohol, potassium chloride, magnesium sulfate, sodium chloride flush, sodium chloride, ondansetron, polyethylene glycol, acetaminophen **OR** acetaminophen, albuterol sulfate HFA      Intake/Output Summary (Last 24 hours) at 4/26/2021 0993  Last data filed at 4/26/2021 0525  Gross per 24 hour   Intake 0 ml   Output 750 ml   Net -750 ml       Physical Exam Performed:    /80   Pulse 82   Temp 97.6 °F (36.4 °C) (Axillary)   Resp 18   Ht 5' (1.524 m)   Wt 93 lb 4.1 oz (42.3 kg)   SpO2 93%   BMI 18.21 kg/m²     General appearance: No apparent distress  HEENT: Pupils equal, round, and reactive to light. Conjunctivae/corneas clear. Neck: Supple, with full range of motion. No jugular venous distention. Trachea midline. Respiratory:  Normal respiratory effort. Diminished breath sounds bilateral cardiovascular: Regular rate and rhythm with normal S1/S2 without murmurs, rubs or gallops. Abdomen: Soft, non-tender, non-distended with normal bowel sounds. Musculoskeletal: No clubbing, cyanosis or edema bilaterally. Skin: Skin color, texture, turgor normal.  No rashes or lesions.   Neurologic: No focal deficits  Psychiatric: Alert and oriented x3  Capillary Refill: Brisk,< 3 seconds   Peripheral Pulses: +2 palpable, equal bilaterally       Labs:   Recent Labs     04/24/21  0615 04/25/21  0545 04/26/21  0605   WBC 7.6 7.3 6.1   HGB 8.3* 9.6* 10.0*   HCT 26.6* 31.1* 32.1*    379 439     Recent Labs     04/24/21  0601 04/25/21  0545 04/26/21  0605    140 142   K 3.8 3.8 3.6   CL 97* 97* 95*   CO2 32 38* 42*   BUN 3* 5* 7   CREATININE <0.5* <0.5* <0.5*   CALCIUM 8.8 8.7 8.9   PHOS 3.4 2.8 2.6     No results for input(s): AST, ALT, BILIDIR, BILITOT, ALKPHOS in the last 72 hours. No results for input(s): INR in the last 72 hours. No results for input(s): Kiana Castor in the last 72 hours. Urinalysis:      Lab Results   Component Value Date    NITRU Negative 04/18/2021    WBCUA 10 04/18/2021    BACTERIA 3+ 11/30/2014    RBCUA 45 04/18/2021    BLOODU MODERATE 04/18/2021    SPECGRAV >1.030 04/18/2021    GLUCOSEU 250 04/18/2021    GLUCOSEU NEGATIVE 03/19/2011       Radiology:  NM Cardiac Stress Test Nuclear Imaging   Final Result      Fluoroscopy modified barium swallow with video   Final Result   1. Moderate laryngeal penetration without tracheal aspiration of honey   thickened liquids. 2. Flash laryngeal penetration without tracheal aspiration of purees. 3. Progressive coating and pooling of residual barium product within the   piriform sinuses and posterior pharyngeal wall, especially with purees. Please see separate speech pathology report for full discussion of findings   and recommendations. XR CHEST PORTABLE   Final Result   1. Stable volume of mild-to-moderate bilateral layering pleural effusions. 2. Mild worsening of bilateral perihilar ill-defined consolidation most   suggestive of alveolar edema. 3. Moderate cardiomegaly. 4. Recommend retraction of endotracheal tube 2.5 cm. XR CHEST PORTABLE   Final Result   Stable perihilar edema with bilateral pleural effusions and bibasilar volume   loss. Enteric catheter tip in the distal gastric body or gastric antrum just below   the lower margin of the image.          XR CHEST PORTABLE   Final Result   Increasing bilateral lower lobe airspace disease with volume loss concerning   for mucous plugging and lower lobe collapse. Pneumonia also considered. The findings were sent to the Radiology Results [address] at 7:02   am on 4/20/2021to be communicated to a licensed caregiver. XR CHEST PORTABLE   Final Result   1. Appropriate positioning of endotracheal tube. 2. Mild cardiomegaly. 3. Appropriate radiographic positioning of intervally placed left internal   jugular approach central venous catheter. XR CHEST PORTABLE   Final Result   Endotracheal tube in satisfactory position. Mild/moderate cardiomegaly. Prominent gaseous distension of the small and large bowel. CT CHEST PULMONARY EMBOLISM W CONTRAST   Final Result   Linear atelectasis versus scarring right middle lobe. No evidence for acute   infiltrates. This study is not optimized for evaluation of the pulmonary arteries. There are no central filling defects however the possibly of small distal   pulmonary emboli cannot be totally excluded         XR CHEST PORTABLE   Final Result   Moderate cardiomegaly without pulmonary vascular congestion. Mild bibasilar   atelectasis worse on the left. Some underlying scarring also suspected.                  Assessment/Plan:    Acute on chronic hypercapnic respiratory failure  Likely secondary to muscular dystrophy  Extubated on April 22  Down to 1 L    Muscular dystrophy  Neurology consulted  Overall poor prognosis as there has been progression in the past 1 year    Shock  Possibly secondary to sedation and ventilation  Continue with broad-spectrum antibiotics  Off all pressors    KOSTAS  Creatinine remained stable  Continue IV fluids  Great urine output    Elevated troponin  Denies any chest pain  Serial troponin stable   echo with normal ejection fraction of 55 to 60%, severely dilated right ventricle  Stress test with negative work-up  Likely demand ischemia     Hypokalemia  Replete and recheck    Dysphagia  Modified barium swallow performed, not cleared to eat food Patient asking to eat even if it results in death, continue to feed per patient request    DVT Prophylaxis: Lovenox  Diet: DIET DYSPHAGIA PUREED; Dysphagia Pureed;  Moderately Thick (Honey)  Code Status:Limited, no to everything  PT/OT Eval Status: Ordered    Dispo -inpatient, likely discharge Monday or Tuesday    Diana Cook MD

## 2021-04-26 NOTE — DISCHARGE INSTR - COC
Continuity of Care Form    Patient Name: Donald Montoya   :  1965  MRN:  [id number]    Admit date:  2021  Discharge date:  2021    Code Status Order: [facility]   Advance Directives:   [facility] Documentation       Date/Time Healthcare Directive Type of Healthcare Directive Copy in [address] [place] [address] [address] Agent's Name Healthcare Agent's Phone Number    [phone]  Yes, patient has an advance directive for healthcare treatment  Durable power of  for health care  Yes, copy in chart  Healthcare power of   Tawny Morales  [phone]            Admitting Physician:  Tali Santamaria MD  PCP: DELISA Ortega CNP    Discharging Nurse:  ENMANUEL EsquivelFORD [facility] [place] Unit/Room#: Z2C-5263/7334-92  Discharging Unit Phone Number: [phone]    Emergency Contact:   Extended Emergency Contact Information  Primary Emergency Contact: Becca Weiner of [address] [place] [address] Phone: [phone]  Mobile Phone: [phone]  Relation: Parent    Past Surgical History:  Past Surgical History:   Procedure Laterality Date    ACHILLES TENDON SURGERY      both legs    CHOLECYSTECTOMY      INTUBATION  2021            Immunization History:   Immunization History   Administered Date(s) Administered    Influenza Virus Vaccine 2012, 2012, 2014, 10/01/2015    Influenza, Intradermal, Preservative free 2013    Influenza, Corinn Donath, IM, PF (6 mo and older Fluzone, Flulaval, Fluarix, and 3 yrs and older Afluria) 2016, 2018, 2020    Influenza, Corinn Donath, Recombinant, IM PF (Flublok 18 yrs and older) 2018    Pneumococcal Polysaccharide ([id number]) 10/19/2015    Tdap (Boostrix, Adacel) 2008       Active Problems:  Patient Active Problem List   Diagnosis Code    Scoliosis M41.9    Muscular dystrophy ([facility] G71.00    Narcotic abuse ([facility] [facility] F11.10    Anxiety F41.9    Essential hypertension I10    Obstructive sleep apnea G47.33    COPD, moderate (HCC) J44.9    GERD without esophagitis K21.9    Erectile dysfunction of organic origin 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.9    Nausea & vomiting R11.2    Diarrhea R19.7    Generalized weakness R53.1    Hyponatremia E87.1    Hypochloremia E87.8    Hyperglycemia R73.9    Pneumonia J18.9    Acute respiratory failure with hypoxia (HCC) J96.01    Elevated troponin R77.8    Acute respiratory failure with hypoxia and hypercapnia (HCC) J96.01, J96.02    Abnormal chest x-ray R93.89    Shock ([address] R57.9       Isolation/Infection:   Isolation            No Isolation          Patient Infection Status       Infection Onset Added Last Indicated Last Indicated By Review Planned Expiration Resolved Resolved By    None active    Resolved    COVID-19 Rule Out 04/17/21 04/17/21 04/17/21 SARS-CoV-2 NAAT (Rapid) (Ordered)   04/18/21 Anat Ryan RN    COVID-19 Rule Out 04/17/21 04/17/21 04/17/21 SARS-CoV-2 NAAT (Rapid) (Ordered)   04/17/21 Rule-Out Test Resulted            Nurse Assessment:  Last Vital Signs: /76   Pulse 85   Temp 98.8 °F (37.1 °C) (Axillary)   Resp 18   Ht 5' (1.524 m)   Wt 93 lb 4.1 oz (42.3 kg)   SpO2 96%   BMI 18.21 kg/m²     Last documented pain score (0-10 scale): Pain Level: 0  Last Weight:   Wt Readings from Last 1 Encounters:   04/26/21 93 lb 4.1 oz (42.3 kg)     Mental Status:  oriented and alert    IV Access:  - PICC - site  [facility], insertion date: 04/18/2021    Nursing Mobility/ADLs:  Walking   Dependent  Transfer  Dependent  Bathing  Dependent  Dressing  Dependent  Toileting  Dependent  Feeding  Assisted  Med Admin  Dependent  Med Delivery   crushed    Wound Care Documentation and Therapy:        Elimination:  Continence:   · Bowel: No  · Bladder: camp  Urinary Catheter:  Insertion Date: 04/18/21   Colostomy/Ileostomy/Ileal Conduit: No       Date of Last BM: 04/24/21      Intake/Output Summary (Last 24 hours) at 4/26/2021 1515  Last data filed at 4/26/2021 0843  Gross per 24 hour   Intake 420 ml   Output 500 ml   Net -80 ml     I/O last 3 completed shifts: In: 5 [P.O.:420]  Out: 500 [Urine:500]    Safety Concerns: At Risk for Falls and Aspiration Risk    Impairments/Disabilities:      muscular dystrophy    Nutrition Therapy:  Current Nutrition Therapy:   - Oral Diet:  Dysphagia 1 pureed and aspirates all foods; been giving pureed since going hospice    Routes of Feeding: Oral  Liquids: Honey Thick Liquids  Daily Fluid Restriction: no  Last Modified Barium Swallow with Video (Video Swallowing Test): done on 04/24/21/aspirates on all foods    Treatments at the Time of Hospital Discharge:   Respiratory Treatments: albuterol, sodium chloride inhalers  Oxygen Therapy:  is on oxygen at 3 L/min per nasal cannula.   Ventilator:    - No ventilator support    Rehab Therapies: Physical Therapy and Occupational Therapy  Weight Bearing Status/Restrictions: No weight bearing restirctions  Other Medical Equipment (for information only, NOT a DME order):  hospital bed  Other Treatments: n/a    Patient's personal belongings (please select all that are sent with patient):  None    RN SIGNATURE:  Electronically signed by Sydni Mullins RN on 4/26/21 at 6:47 PM EDT    CASE MANAGEMENT/SOCIAL WORK SECTION    Inpatient Status Date: 4/17/2021    Readmission Risk Assessment Score:  Readmission Risk              Risk of Unplanned Readmission:        21           Discharging to Facility/ Agency   · [facility]  · Address:  · Phone: [phone]  · Fax: [phone]    Dialysis Facility (if applicable)   · Name:  · Address:  · Dialysis Schedule:  · Phone:  · Fax:    / signature: Electronically signed by SHANTE Hernandez, BONG on 4/26/21 at 3:53 PM EDT    PHYSICIAN SECTION    Prognosis: Guarded    Condition at Discharge: Terminal    Rehab Potential (if transferring to Rehab): Poor    Recommended Labs or Other Treatments After Discharge: Hospice    Physician Certification: I certify the above information and transfer of Anali Carter  is necessary for the continuing treatment of the diagnosis listed and that he requires Hospice for less 30 days.      Update Admission H&P: No change in H&P    PHYSICIAN SIGNATURE:  Electronically signed by Whit Cornejo MD on 4/26/21 at 3:16 PM EDT

## 2021-04-26 NOTE — PROGRESS NOTES
Comprehensive Nutrition Assessment    Type and Reason for Visit:  Reassess, Consult    Nutrition Recommendations/Plan:   RD available if POC changes    Nutrition Assessment:  Follow-up. Pt was prev intubated. Extubated 4/23. Now on 1L O2. NPO recommended per SLP. Pt and family wants pt to eat although high risk for aspiration was explained. Pt declined NG tube placement. Pt now DNR-CC on dysphagia pureed diet with moderately thick liquids. RD to disregard tube feeding consult as that is not the pt's wishes. RD available if POC changes. Malnutrition Assessment:  Malnutrition Status:  Insufficient data      Estimated Daily Nutrient Needs:  Energy (kcal):  4795-5222 kcal/day (25-30 kcal/kg CBW)  Protein (g):  54-90 g/day (1.2-2 g/kg CBW)        Fluid (ml/day):  per provider        Nutrition Related Findings:  Active BS. Trace BLE edema. Wounds:  None       Current Nutrition Therapies:    DIET DYSPHAGIA PUREED; Dysphagia Pureed; Moderately Thick (Honey)    Anthropometric Measures:  · Height: 5' (152.4 cm)  · Current Body Weight: 93 lb (42.2 kg)   · Admission Body Weight: 97 lb (44 kg)    · Ideal Body Weight: 106 lbs; % Ideal Body Weight 87.7 %   · BMI: 18.2  · Adjusted Body Weight:  ; No Adjustment   · BMI Categories: Underweight (BMI less than 18.5)       Nutrition Diagnosis:   · Inadequate oral intake related to inadequate protein-energy intake, swallowing difficulty as evidenced by BMI, swallow study results    Nutrition Interventions:   Food and/or Nutrient Delivery:  Continue Current Diet  Nutrition Education/Counseling:  No recommendation at this time   Coordination of Nutrition Care:  Continue to monitor while inpatient    Goals:   Tolerate most appropriate form of nutrition per provider       Nutrition Monitoring and Evaluation:   Behavioral-Environmental Outcomes:  None Identified   Food/Nutrient Intake Outcomes:  Diet Advancement/Tolerance, Food and Nutrient Intake  Physical Signs/Symptoms Outcomes: Biochemical Data, Chewing or Swallowing, Hemodynamic Status, Meal Time Behavior, Nutrition Focused Physical Findings, Weight     Discharge Planning:     Too soon to determine     Electronically signed by Luisa Vasques RD, LD on 4/26/21 at 9:23 AM EDT    Contact: [phone]

## 2021-04-26 NOTE — DISCHARGE SUMMARY
Hospital Medicine Discharge Summary    Patient ID: Juan Luis Garcia      Patient's PCP: Faith Sinclair, APRN - CNP    Admit Date: 4/17/2021     Discharge Date:   4/26/21    Admitting Physician: David Carvalho MD     Discharge Physician: Kenny Edwards MD     Discharge Diagnoses: Active Hospital Problems    Diagnosis Date Noted    Elevated troponin [R77.8]     Acute respiratory failure with hypoxia and hypercapnia (HCC) [J96.01, J96.02]     Abnormal chest x-ray [R93.89]     Shock ([address] [R57.9]     Acute respiratory failure with hypoxia (HCC) [J96.01] 04/17/2021    Generalized weakness [R53.1] 11/13/2016    Essential hypertension [I10]     COPD, moderate ([address] [J44.9]     Muscular dystrophy ([address] [G71.00]        The patient was seen and examined on day of discharge and this discharge summary is in conjunction with any daily progress note from day of discharge. Hospital Course:   Patient is a 59-year-old male with a past medical history of muscular dystrophy, COPD, hypertension who presented to the hospital with shortness of breath. Initially on nasal cannula and then requiring BiPAP. Patient refusing BiPAP and eventually rapid response called and patient was intubated. After a few days on the ventilator patient was able to become extubated after weaning off the ventilator. There was concern due to his history of muscular dystrophy that this could be unsuccessful. He was extubated and placed on BiPAP and then eventually down to 2 L nasal cannula. Patient underwent bronchoscopy on April 20 while still on the ventilator and was found to have bilateral lower lobe pneumonia. Patient finished 7 days of IV antibiotics. Hope was that the patient would be able to go back home with continued physical and occupational therapy but patient unable to participate in therapy. Modified barium performed in patient was advised to remain n.p.o. his swallowing was not intact.   Patient family decided that radiographic positioning of intervally placed left internal   jugular approach central venous catheter. XR CHEST PORTABLE   Final Result   Endotracheal tube in satisfactory position. Mild/moderate cardiomegaly. Prominent gaseous distension of the small and large bowel. CT CHEST PULMONARY EMBOLISM W CONTRAST   Final Result   Linear atelectasis versus scarring right middle lobe. No evidence for acute   infiltrates. This study is not optimized for evaluation of the pulmonary arteries. There are no central filling defects however the possibly of small distal   pulmonary emboli cannot be totally excluded         XR CHEST PORTABLE   Final Result   Moderate cardiomegaly without pulmonary vascular congestion. Mild bibasilar   atelectasis worse on the left. Some underlying scarring also suspected. Disposition: Hospice    Condition at Discharge: Terminal    Discharge Instructions/Follow-up: Hospice    Code Status:  DNR-CC     Activity: activity as tolerated    Diet: regular diet      Labs:  For convenience and continuity at follow-up the following most recent labs are provided:      CBC:    Lab Results   Component Value Date    WBC 6.1 04/26/2021    HGB 10.0 04/26/2021    HCT 32.1 04/26/2021     04/26/2021       Renal:    Lab Results   Component Value Date     04/26/2021    K 3.6 04/26/2021    CL 95 04/26/2021    CO2 42 04/26/2021    BUN 7 04/26/2021    CREATININE <0.5 04/26/2021    CALCIUM 8.9 04/26/2021    PHOS 2.6 04/26/2021       Discharge Medications:     Current Discharge Medication List           Details   DULoxetine (CYMBALTA) 30 MG extended release capsule Take 1 capsule by mouth 3 times daily  Qty: 270 capsule, Refills: 1    Comments: **Patient requests 90 days supply**  Associated Diagnoses: Anxiety      albuterol sulfate HFA (PROAIR HFA) 108 (90 BASE) MCG/ACT inhaler INHALE 2 PUFFS INTO THE MOUTH EVERY 4 HOURS AS NEEDED FOR WHEEZING OR SHORTNESS OF BREATH  Qty: 8.5 g, Refills: 0             No future appointments. Time Spent on discharge is more than 30 minutes in the examination, evaluation, counseling and review of medications and discharge plan. Signed:    Samantha Jaimes MD   4/26/2021      Thank you DELISA Veliz CNP for the opportunity to be involved in this patient's care. If you have any questions or concerns please feel free to contact me at [phone].